# Patient Record
Sex: FEMALE | Race: BLACK OR AFRICAN AMERICAN | Employment: OTHER | URBAN - METROPOLITAN AREA
[De-identification: names, ages, dates, MRNs, and addresses within clinical notes are randomized per-mention and may not be internally consistent; named-entity substitution may affect disease eponyms.]

---

## 2020-07-31 ENCOUNTER — HOSPITAL ENCOUNTER (OUTPATIENT)
Dept: MRI IMAGING | Age: 70
Discharge: HOME | End: 2020-07-31
Attending: SURGERY
Payer: MEDICARE

## 2020-07-31 DIAGNOSIS — C50.911 BREAST CANCER, RIGHT (HCC): ICD-10-CM

## 2020-07-31 PROCEDURE — 74011250636 HC RX REV CODE- 250/636: Performed by: SURGERY

## 2020-07-31 PROCEDURE — 74011000258 HC RX REV CODE- 258: Performed by: SURGERY

## 2020-07-31 PROCEDURE — A9575 INJ GADOTERATE MEGLUMI 0.1ML: HCPCS | Performed by: SURGERY

## 2020-07-31 PROCEDURE — 77049 MRI BREAST C-+ W/CAD BI: CPT

## 2020-07-31 RX ORDER — GADOTERATE MEGLUMINE 376.9 MG/ML
24 INJECTION INTRAVENOUS
Status: COMPLETED | OUTPATIENT
Start: 2020-07-31 | End: 2020-07-31

## 2020-07-31 RX ADMIN — SODIUM CHLORIDE 100 ML: 900 INJECTION, SOLUTION INTRAVENOUS at 15:16

## 2020-07-31 RX ADMIN — GADOTERATE MEGLUMINE 21 ML: 376.9 INJECTION INTRAVENOUS at 15:16

## 2020-08-05 PROBLEM — E66.01 OBESITY, MORBID (HCC): Status: ACTIVE | Noted: 2020-08-05

## 2020-08-05 NOTE — H&P (VIEW-ONLY)
Sadie Adams DO 
[facility] [address] [place] Phone [phone]   Fax [phone] Date of visit: 2020 Primary/Requesting provider: Magi Adamson MD 
 
Chief Complaint Patient presents with  New Patient  
  right breast IDC Name: Flor Augustine MRN: [id number] : 1950 Age: 78 y.o. Sex: female PCP: Magi Adamson MD  
 
 
CC:   
Chief Complaint Patient presents with  New Patient  
  right breast IDC HPI: 
 
 Flor Augustine is a 78 y.o. female who presents for evaluation of right breast cancer. 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 Patient has positive family history of breast cancer and a sister at age 68. She denies any previous personal history of breast cancer. She denies any previous biopsies or suspicious abnormalities on mammogram.  Biopsy in [place] led to diagnosis of infiltrating ductal carcinoma ER RI status and HER-2 status is pending. An MRI has been completed revealing a 1.2 cm lesion in the right breast with no other suspicious lymphadenopathy or cancer in other locations. She is here today to discuss right breast cancer and possible surgery. MRI BILATERAL BREASTS WITHOUT AND WITH CONTRAST, 2020. CLINICAL HISTORY:  New diagnosis of right breast cancer. 
  
Technique: Multiplanar multisequence imaging of the breast were performed prior 
to and following the uneventful intravenous administration of 21 mL of Dotarem. Postcontrast imaging was performed using a dynamic technique. Images were 
reviewed using the [employer] software package.  
  
Sequences obtained: Axial T1 with and without fat saturation, axial STIR, and 
dynamic axial postcontrast fat-saturated T1-weighted images.  Additional 
subtraction images, maximum intensity projected images, and sagittal 
 reconstructed images were made from postcontrast images. 
  
Comparison studies: Right breast ultrasound 7/22/2020. FINDINGS:  
A skin marker has been placed at the 3:00 position of the right breast located 4.5 cm from the nipple. The breasts are composed of heterogeneous fibroglandular 
elements. Axillary, and internal mammary lymph nodes are seen which are not 
clearly enlarged or dysmorphic in their appearance. Evaluation of the lungs is 
limited by  MRI imaging. However, no obvious pulmonary masses are seen. No 
significant pleural effusions are seen. The liver is obscured by cardiac motion 
artifact and only partially visualized. However, no focal signal abnormalities 
are seen prior to, or following administration of contrast to suggest a possible 
hepatic lesion. Following the administration of intravenous contrast, normal enhancement is seen 
of the heart and vascular structures of the breasts. Moderate background 
physiologic enhancement is seen of the breasts which could obscure a very subtle 
process. An enhancing mass is seen at the 11:00 position of the right breast 
located 3 cm from the nipple measuring 1.2 cm long by 1 cm wide by 0.8 cm 
craniocaudal consistent with the patient's known right breast cancer. No 
dominant enhancing masses otherwise seen. Only nodular enhancement is seen in 
the bilateral breasts which appears symmetric in distribution and degree of 
enhancement consistent with benign enhancement. The anterior right breast skin 
does appear thickened and enhances. No enhancing osseous lesion is seen. 
  
IMPRESSION IMPRESSION: 
1.  1.2 cm x 1 cm x 0.8 cm enhancing mass at the 11:00 position of the right 
breast located 3 cm from the nipple consistent with the patient's known right 
breast cancer. 
  
2.  No findings concerning for metastatic disease in the visualized chest. 
However, there is asymmetric anterior right breast skin thickening which 
 demonstrates additional enhancement. This can be an indicator for lymphatic 
invasion (i.e. inflammatory breast cancer). Recommend correlation with clinical 
exam findings. 
  
3. Moderate nodular enhancement currently favored to represent benign background 
enhancement. However, the degree of enhancement could obscure a very subtle 
process. This patient should have a follow-up breast MRI 18 months following 
definitive surgical treatment to ensure that these changes remain stable or 
improve. 
  
BI-RADS Assessment Category 6: Known Biopsy Proven Malignancy PMH: 
 
Past Medical History:  
Diagnosis Date  Hypertension  Liver disease PSH: 
 
Past Surgical History:  
Procedure Laterality Date  HX HYSTERECTOMY  2003 MEDS: 
 
No current outpatient medications on file. No current facility-administered medications for this visit. ALLERGIES:   
 
Allergies no known allergies SH: Social History Tobacco Use  Smoking status: Never Smoker  Smokeless tobacco: Never Used Substance Use Topics  Alcohol use: Not on file  Drug use: Not on file FH: No family history on file. Review of Systems Constitutional: Negative. HENT: Negative. Eyes: Negative. Respiratory: Negative. Cardiovascular: Negative. Gastrointestinal: Negative. Genitourinary: Negative. Musculoskeletal: Negative. Skin:  
     Right breast IDC Neurological: Negative. Endo/Heme/Allergies: Negative. Psychiatric/Behavioral: Negative. Physical Exam:  
 
Visit Vitals Pulse 78 Ht 5' 4\" (1.626 m) Wt 250 lb (113.4 kg) BMI 42.91 kg/m² Physical Exam 
HENT:  
   Head: Normocephalic and atraumatic. Eyes:  
   Pupils: Pupils are equal, round, and reactive to light. Neck: Musculoskeletal: Normal range of motion and neck supple. Thyroid: No thyromegaly. Trachea: No tracheal deviation. Cardiovascular: Rate and Rhythm: Normal rate and regular rhythm. Heart sounds: Normal heart sounds. No murmur. Pulmonary:  
   Effort: Pulmonary effort is normal. No respiratory distress. Breath sounds: Normal breath sounds. No wheezing or rales. Chest:  
 
 
Abdominal:  
   General: Bowel sounds are normal. There is no distension. Palpations: Abdomen is soft. There is no mass. Tenderness: There is no abdominal tenderness. There is no guarding or rebound. Musculoskeletal: Normal range of motion. Skin: 
   General: Skin is warm and dry. Findings: No erythema. Neurological:  
   Mental Status: She is alert and oriented to person, place, and time. Psychiatric:     
   Mood and Affect: Mood normal.     
   Judgment: Judgment normal.  
 
 
 
Assessment/Plan:  Nova Parra is a 78 y.o. female who has signs and symptoms consistent with right breast infiltrating ductal carcinoma 1.2 cm for a T1BN0 Clinical diagnosis. The ER NC and HER-2 status are pending. Today we discussed breast cancer and she has decided upon bilateral mastectomy with right sentinel lymph node biopsy and possible axillary dissection. She will be scheduled at the next available date and time. ICD-10-CM ICD-9-CM 1. Malignant neoplasm of central portion of right female breast, unspecified estrogen receptor status (HCC)  C50.111 174.1 2. Obesity, morbid ([facility] [address]  E66.01 278.01 I went through the risks of bleeding, infection and anesthesia. Counseling time:counseling time more than 50% of visit: 1 hour was utilized in office visit today 50% times in a face-to-face discussion about breast cancer. We discussed the breast anatomy followed by history of breast cancer operations. We discussed the importance of the pathology and hormone receptors. In the end the patient has decided upon bilateral mastectomy Annabelle Stallings   We discussed the details of the procedure the postoperative expectations and recovery time. Patient will be presented at breast conference tomorrow.  
 
Signed: Sherry Gardiner DO  
8/5/2020  10:42 AM

## 2020-08-13 ENCOUNTER — HOSPITAL ENCOUNTER (OUTPATIENT)
Dept: SURGERY | Age: 70
Discharge: HOME | End: 2020-08-13

## 2020-08-17 VITALS — HEIGHT: 61 IN | WEIGHT: 250 LBS | BODY MASS INDEX: 47.2 KG/M2

## 2020-08-17 RX ORDER — DICLOFENAC SODIUM 10 MG/G
4 GEL TOPICAL
COMMUNITY
End: 2020-12-08

## 2020-08-17 RX ORDER — OMEPRAZOLE 40 MG/1
40 CAPSULE, DELAYED RELEASE ORAL DAILY
COMMUNITY

## 2020-08-17 RX ORDER — PRAVASTATIN SODIUM 40 MG/1
40 TABLET ORAL
COMMUNITY
End: 2020-12-08

## 2020-08-17 RX ORDER — FUROSEMIDE 20 MG/1
20 TABLET ORAL DAILY
COMMUNITY
End: 2020-12-08

## 2020-08-17 RX ORDER — NIFEDIPINE 90 MG/1
90 TABLET, EXTENDED RELEASE ORAL DAILY
COMMUNITY

## 2020-08-17 RX ORDER — METOPROLOL SUCCINATE 50 MG/1
50 TABLET, EXTENDED RELEASE ORAL DAILY
COMMUNITY

## 2020-08-17 RX ORDER — GLUCOSAMINE SULFATE 1500 MG
1000 POWDER IN PACKET (EA) ORAL DAILY
COMMUNITY

## 2020-08-17 RX ORDER — ASPIRIN 325 MG
81 TABLET ORAL DAILY
COMMUNITY

## 2020-08-17 NOTE — PERIOP NOTES
Patient verified name and . Order for consent not found in EHR. Type 2 surgery, PAT phone assessment complete. Orders not received. Labs per surgeon: no orders received. Labs per anesthesia protocol: Hgb, K+ and creatinine- Pt instructed to come for lab work (Monday- Friday 8:30 AM- 4:00 PM) prior to surgery day. Pt voiced an understanding. Patient answered medical/surgical history questions at their best of ability. All prior to admission medications documented in [facility]. Patient instructed to take the following medications the day of surgery according to anesthesia guidelines with a small sip of water: metoprolol, nifedipine and omeprazole. Hold all vitamins/supplements 7 days prior to surgery and NSAIDS 5 days prior to surgery. Patient instructed on the following:    > Covid result dated 8/10/20-   SARS-CoV-2, RUBEN    CANCELED    Comment:    Quantity was not sufficient for analysis. E-mail sent to Brooks Ban and Lorenzo Mena reguarding the above. > 1 visitor allowed at this time. >Arrive at [facility], time of arrival to be called the day before by 1700  >NPO after midnight including gum, mints, and ice chips  >Responsible adult must drive patient to the hospital, stay during surgery, and patient will need supervision 24 hours after anesthesia  >Use antibacterial soap in shower the night before surgery and on the morning of surgery  >All piercings must be removed prior to arrival.    >Leave all valuables (money and jewelry) at home but bring insurance card and ID on DOS. >Do not wear make-up, nail polish, lotions, cologne, perfumes, powders, or oil on skin.

## 2020-08-19 ENCOUNTER — ANESTHESIA EVENT (OUTPATIENT)
Dept: SURGERY | Age: 70
End: 2020-08-19
Payer: MEDICARE

## 2020-08-19 ENCOUNTER — HOSPITAL ENCOUNTER (OUTPATIENT)
Dept: LAB | Age: 70
Discharge: HOME | End: 2020-08-19
Payer: MEDICARE

## 2020-08-19 LAB
CREAT SERPL-MCNC: 1.16 MG/DL (ref 0.6–1)
HGB BLD-MCNC: 14.6 G/DL (ref 11.7–15.4)
POTASSIUM SERPL-SCNC: 3.5 MMOL/L (ref 3.5–5.1)

## 2020-08-19 PROCEDURE — 82565 ASSAY OF CREATININE: CPT

## 2020-08-19 PROCEDURE — 84132 ASSAY OF SERUM POTASSIUM: CPT

## 2020-08-19 PROCEDURE — 85018 HEMOGLOBIN: CPT

## 2020-08-19 PROCEDURE — 36415 COLL VENOUS BLD VENIPUNCTURE: CPT

## 2020-08-20 ENCOUNTER — HOSPITAL ENCOUNTER (OUTPATIENT)
Age: 70
Setting detail: OUTPATIENT SURGERY
Discharge: HOME | End: 2020-08-20
Attending: SURGERY | Admitting: SURGERY
Payer: MEDICARE

## 2020-08-20 ENCOUNTER — ANESTHESIA (OUTPATIENT)
Dept: SURGERY | Age: 70
End: 2020-08-20
Payer: MEDICARE

## 2020-08-20 ENCOUNTER — APPOINTMENT (OUTPATIENT)
Dept: NUCLEAR MEDICINE | Age: 70
End: 2020-08-20
Attending: SURGERY
Payer: MEDICARE

## 2020-08-20 VITALS
SYSTOLIC BLOOD PRESSURE: 124 MMHG | BODY MASS INDEX: 47.2 KG/M2 | TEMPERATURE: 98 F | HEART RATE: 61 BPM | DIASTOLIC BLOOD PRESSURE: 71 MMHG | RESPIRATION RATE: 16 BRPM | OXYGEN SATURATION: 93 % | WEIGHT: 250 LBS | HEIGHT: 61 IN

## 2020-08-20 DIAGNOSIS — C50.111 MALIGNANT NEOPLASM OF CENTRAL PORTION OF RIGHT FEMALE BREAST, UNSPECIFIED ESTROGEN RECEPTOR STATUS (HCC): ICD-10-CM

## 2020-08-20 DIAGNOSIS — Z40.01 PROPHYLACTIC BREAST REMOVAL: ICD-10-CM

## 2020-08-20 PROBLEM — C50.911 MALIGNANT NEOPLASM OF RIGHT FEMALE BREAST (HCC): Status: ACTIVE | Noted: 2020-08-20

## 2020-08-20 LAB — GLUCOSE BLD STRIP.AUTO-MCNC: 98 MG/DL (ref 65–100)

## 2020-08-20 PROCEDURE — 74011000250 HC RX REV CODE- 250: Performed by: ANESTHESIOLOGY

## 2020-08-20 PROCEDURE — 74011250636 HC RX REV CODE- 250/636: Performed by: ANESTHESIOLOGY

## 2020-08-20 PROCEDURE — 77030040922 HC BLNKT HYPOTHRM STRY -A: Performed by: ANESTHESIOLOGY

## 2020-08-20 PROCEDURE — 77030013674 HC FIL EXPND TISS ALGN -A: Performed by: SURGERY

## 2020-08-20 PROCEDURE — 76010000173 HC OR TIME 3 TO 3.5 HR INTENSV-TIER 1: Performed by: SURGERY

## 2020-08-20 PROCEDURE — 77030013567 HC DRN WND RESERV BARD -A: Performed by: SURGERY

## 2020-08-20 PROCEDURE — C1789 PROSTHESIS, BREAST, IMP: HCPCS | Performed by: SURGERY

## 2020-08-20 PROCEDURE — 77030002966 HC SUT PDS J&J -A: Performed by: SURGERY

## 2020-08-20 PROCEDURE — 76060000038 HC ANESTHESIA 3.5 TO 4 HR: Performed by: SURGERY

## 2020-08-20 PROCEDURE — 74011250636 HC RX REV CODE- 250/636: Performed by: SURGERY

## 2020-08-20 PROCEDURE — 77030031139 HC SUT VCRL2 J&J -A: Performed by: SURGERY

## 2020-08-20 PROCEDURE — 77030039425 HC BLD LARYNG TRULITE DISP TELE -A: Performed by: ANESTHESIOLOGY

## 2020-08-20 PROCEDURE — 77030002933 HC SUT MCRYL J&J -A: Performed by: SURGERY

## 2020-08-20 PROCEDURE — 74011250636 HC RX REV CODE- 250/636: Performed by: NURSE ANESTHETIST, CERTIFIED REGISTERED

## 2020-08-20 PROCEDURE — 77030027515 HC TBNG LIPO SUC MDCO -B: Performed by: SURGERY

## 2020-08-20 PROCEDURE — 76210000016 HC OR PH I REC 1 TO 1.5 HR: Performed by: SURGERY

## 2020-08-20 PROCEDURE — 74011000250 HC RX REV CODE- 250: Performed by: SURGERY

## 2020-08-20 PROCEDURE — 87635 SARS-COV-2 COVID-19 AMP PRB: CPT

## 2020-08-20 PROCEDURE — 77030011825 HC SUPP SURG PSTOP S2SG -B: Performed by: SURGERY

## 2020-08-20 PROCEDURE — 77030011265 HC ELECTRD BLD HEX COVD -A: Performed by: SURGERY

## 2020-08-20 PROCEDURE — 88307 TISSUE EXAM BY PATHOLOGIST: CPT

## 2020-08-20 PROCEDURE — 77030040506 HC DRN WND MDII -A: Performed by: SURGERY

## 2020-08-20 PROCEDURE — 74011250637 HC RX REV CODE- 250/637: Performed by: ANESTHESIOLOGY

## 2020-08-20 PROCEDURE — 77030037088 HC TUBE ENDOTRACH ORAL NSL COVD-A: Performed by: ANESTHESIOLOGY

## 2020-08-20 PROCEDURE — 77030012406 HC DRN WND PENRS BARD -A: Performed by: SURGERY

## 2020-08-20 PROCEDURE — 19303 MAST SIMPLE COMPLETE: CPT | Performed by: SURGERY

## 2020-08-20 PROCEDURE — 74011000250 HC RX REV CODE- 250: Performed by: NURSE ANESTHETIST, CERTIFIED REGISTERED

## 2020-08-20 PROCEDURE — 38525 BIOPSY/REMOVAL LYMPH NODES: CPT | Performed by: SURGERY

## 2020-08-20 PROCEDURE — 77030040504 HC DRN WND MDII -B: Performed by: SURGERY

## 2020-08-20 PROCEDURE — 77030002996 HC SUT SLK J&J -A: Performed by: SURGERY

## 2020-08-20 PROCEDURE — 76210000021 HC REC RM PH II 0.5 TO 1 HR: Performed by: SURGERY

## 2020-08-20 PROCEDURE — 77030041445 HC PENCL ELECT SMK EVAC STRY -B: Performed by: SURGERY

## 2020-08-20 PROCEDURE — 74011250636 HC RX REV CODE- 250/636: Performed by: PLASTIC SURGERY

## 2020-08-20 PROCEDURE — 77030002916 HC SUT ETHLN J&J -A: Performed by: SURGERY

## 2020-08-20 PROCEDURE — A9541 TC99M SULFUR COLLOID: HCPCS

## 2020-08-20 PROCEDURE — 77030008462 HC STPLR SKN PROX J&J -A: Performed by: SURGERY

## 2020-08-20 PROCEDURE — 77030026227 HC FUNL KELLR 2 PCH ALGN -C: Performed by: SURGERY

## 2020-08-20 PROCEDURE — 82962 GLUCOSE BLOOD TEST: CPT

## 2020-08-20 DEVICE — GRAFT HUM TISS W16XL20CM THK0.4-2.4MM REGEN TISS MTRX PERF: Type: IMPLANTABLE DEVICE | Site: BREAST | Status: FUNCTIONAL

## 2020-08-20 RX ORDER — ONDANSETRON 4 MG/1
4 TABLET, ORALLY DISINTEGRATING ORAL ONCE
Status: DISCONTINUED | OUTPATIENT
Start: 2020-08-20 | End: 2020-08-20 | Stop reason: HOSPADM

## 2020-08-20 RX ORDER — OXYCODONE HYDROCHLORIDE 5 MG/1
5 TABLET ORAL
Status: DISCONTINUED | OUTPATIENT
Start: 2020-08-20 | End: 2020-08-20 | Stop reason: HOSPADM

## 2020-08-20 RX ORDER — CEFAZOLIN SODIUM/WATER 2 G/20 ML
SYRINGE (ML) INTRAVENOUS AS NEEDED
Status: DISCONTINUED | OUTPATIENT
Start: 2020-08-20 | End: 2020-08-20 | Stop reason: HOSPADM

## 2020-08-20 RX ORDER — CEFAZOLIN SODIUM/WATER 2 G/20 ML
2 SYRINGE (ML) INTRAVENOUS ONCE
Status: COMPLETED | OUTPATIENT
Start: 2020-08-20 | End: 2020-08-20

## 2020-08-20 RX ORDER — NALOXONE HYDROCHLORIDE 0.4 MG/ML
0.1 INJECTION, SOLUTION INTRAMUSCULAR; INTRAVENOUS; SUBCUTANEOUS
Status: DISCONTINUED | OUTPATIENT
Start: 2020-08-20 | End: 2020-08-20 | Stop reason: HOSPADM

## 2020-08-20 RX ORDER — FENTANYL CITRATE 50 UG/ML
INJECTION, SOLUTION INTRAMUSCULAR; INTRAVENOUS AS NEEDED
Status: DISCONTINUED | OUTPATIENT
Start: 2020-08-20 | End: 2020-08-20 | Stop reason: HOSPADM

## 2020-08-20 RX ORDER — HYDROMORPHONE HYDROCHLORIDE 2 MG/ML
0.5 INJECTION, SOLUTION INTRAMUSCULAR; INTRAVENOUS; SUBCUTANEOUS
Status: DISCONTINUED | OUTPATIENT
Start: 2020-08-20 | End: 2020-08-20 | Stop reason: HOSPADM

## 2020-08-20 RX ORDER — HEPARIN SODIUM 5000 [USP'U]/ML
5000 INJECTION, SOLUTION INTRAVENOUS; SUBCUTANEOUS ONCE
Status: CANCELLED | OUTPATIENT
Start: 2020-08-20 | End: 2020-08-20

## 2020-08-20 RX ORDER — LIDOCAINE HYDROCHLORIDE 10 MG/ML
0.1 INJECTION INFILTRATION; PERINEURAL AS NEEDED
Status: DISCONTINUED | OUTPATIENT
Start: 2020-08-20 | End: 2020-08-20 | Stop reason: HOSPADM

## 2020-08-20 RX ORDER — CEFAZOLIN SODIUM/WATER 2 G/20 ML
2 SYRINGE (ML) INTRAVENOUS ONCE
Status: DISCONTINUED | OUTPATIENT
Start: 2020-08-20 | End: 2020-08-20 | Stop reason: SDUPTHER

## 2020-08-20 RX ORDER — MIDAZOLAM HYDROCHLORIDE 1 MG/ML
2 INJECTION, SOLUTION INTRAMUSCULAR; INTRAVENOUS
Status: COMPLETED | OUTPATIENT
Start: 2020-08-20 | End: 2020-08-20

## 2020-08-20 RX ORDER — GLYCOPYRROLATE 0.2 MG/ML
INJECTION INTRAMUSCULAR; INTRAVENOUS AS NEEDED
Status: DISCONTINUED | OUTPATIENT
Start: 2020-08-20 | End: 2020-08-20 | Stop reason: HOSPADM

## 2020-08-20 RX ORDER — SODIUM CHLORIDE, SODIUM LACTATE, POTASSIUM CHLORIDE, CALCIUM CHLORIDE 600; 310; 30; 20 MG/100ML; MG/100ML; MG/100ML; MG/100ML
100 INJECTION, SOLUTION INTRAVENOUS CONTINUOUS
Status: DISCONTINUED | OUTPATIENT
Start: 2020-08-20 | End: 2020-08-20 | Stop reason: HOSPADM

## 2020-08-20 RX ORDER — LIDOCAINE HYDROCHLORIDE 20 MG/ML
INJECTION, SOLUTION EPIDURAL; INFILTRATION; INTRACAUDAL; PERINEURAL AS NEEDED
Status: DISCONTINUED | OUTPATIENT
Start: 2020-08-20 | End: 2020-08-20 | Stop reason: HOSPADM

## 2020-08-20 RX ORDER — NEOSTIGMINE METHYLSULFATE 1 MG/ML
INJECTION, SOLUTION INTRAVENOUS AS NEEDED
Status: DISCONTINUED | OUTPATIENT
Start: 2020-08-20 | End: 2020-08-20 | Stop reason: HOSPADM

## 2020-08-20 RX ORDER — ROCURONIUM BROMIDE 10 MG/ML
INJECTION, SOLUTION INTRAVENOUS AS NEEDED
Status: DISCONTINUED | OUTPATIENT
Start: 2020-08-20 | End: 2020-08-20 | Stop reason: HOSPADM

## 2020-08-20 RX ORDER — PROPOFOL 10 MG/ML
INJECTION, EMULSION INTRAVENOUS AS NEEDED
Status: DISCONTINUED | OUTPATIENT
Start: 2020-08-20 | End: 2020-08-20 | Stop reason: HOSPADM

## 2020-08-20 RX ORDER — KETAMINE HYDROCHLORIDE 50 MG/ML
INJECTION, SOLUTION INTRAMUSCULAR; INTRAVENOUS AS NEEDED
Status: DISCONTINUED | OUTPATIENT
Start: 2020-08-20 | End: 2020-08-20 | Stop reason: HOSPADM

## 2020-08-20 RX ORDER — BUPIVACAINE HYDROCHLORIDE 2.5 MG/ML
INJECTION, SOLUTION EPIDURAL; INFILTRATION; INTRACAUDAL AS NEEDED
Status: DISCONTINUED | OUTPATIENT
Start: 2020-08-20 | End: 2020-08-20 | Stop reason: HOSPADM

## 2020-08-20 RX ORDER — HEPARIN SODIUM 5000 [USP'U]/ML
5000 INJECTION, SOLUTION INTRAVENOUS; SUBCUTANEOUS ONCE
Status: COMPLETED | OUTPATIENT
Start: 2020-08-20 | End: 2020-08-20

## 2020-08-20 RX ORDER — FLUMAZENIL 0.1 MG/ML
0.2 INJECTION INTRAVENOUS
Status: DISCONTINUED | OUTPATIENT
Start: 2020-08-20 | End: 2020-08-20 | Stop reason: HOSPADM

## 2020-08-20 RX ORDER — HALOPERIDOL 5 MG/ML
1 INJECTION INTRAMUSCULAR
Status: COMPLETED | OUTPATIENT
Start: 2020-08-20 | End: 2020-08-20

## 2020-08-20 RX ORDER — CEFAZOLIN SODIUM/WATER 2 G/20 ML
2 SYRINGE (ML) INTRAVENOUS ONCE
Status: CANCELLED | OUTPATIENT
Start: 2020-08-20 | End: 2020-08-20

## 2020-08-20 RX ORDER — ONDANSETRON 2 MG/ML
INJECTION INTRAMUSCULAR; INTRAVENOUS AS NEEDED
Status: DISCONTINUED | OUTPATIENT
Start: 2020-08-20 | End: 2020-08-20 | Stop reason: HOSPADM

## 2020-08-20 RX ORDER — DEXAMETHASONE SODIUM PHOSPHATE 4 MG/ML
INJECTION, SOLUTION INTRA-ARTICULAR; INTRALESIONAL; INTRAMUSCULAR; INTRAVENOUS; SOFT TISSUE AS NEEDED
Status: DISCONTINUED | OUTPATIENT
Start: 2020-08-20 | End: 2020-08-20 | Stop reason: HOSPADM

## 2020-08-20 RX ORDER — DIPHENHYDRAMINE HYDROCHLORIDE 50 MG/ML
12.5 INJECTION, SOLUTION INTRAMUSCULAR; INTRAVENOUS
Status: DISCONTINUED | OUTPATIENT
Start: 2020-08-20 | End: 2020-08-20 | Stop reason: HOSPADM

## 2020-08-20 RX ORDER — ACETAMINOPHEN 500 MG
1000 TABLET ORAL ONCE
Status: COMPLETED | OUTPATIENT
Start: 2020-08-20 | End: 2020-08-20

## 2020-08-20 RX ORDER — SODIUM CHLORIDE, SODIUM LACTATE, POTASSIUM CHLORIDE, CALCIUM CHLORIDE 600; 310; 30; 20 MG/100ML; MG/100ML; MG/100ML; MG/100ML
75 INJECTION, SOLUTION INTRAVENOUS CONTINUOUS
Status: DISCONTINUED | OUTPATIENT
Start: 2020-08-20 | End: 2020-08-20 | Stop reason: HOSPADM

## 2020-08-20 RX ADMIN — FENTANYL CITRATE 100 MCG: 50 INJECTION INTRAMUSCULAR; INTRAVENOUS at 12:18

## 2020-08-20 RX ADMIN — KETAMINE HYDROCHLORIDE 25 MG: 50 INJECTION INTRAMUSCULAR; INTRAVENOUS at 14:30

## 2020-08-20 RX ADMIN — CEFAZOLIN SODIUM 2 G: 100 INJECTION, POWDER, LYOPHILIZED, FOR SOLUTION INTRAVENOUS at 09:53

## 2020-08-20 RX ADMIN — ACETAMINOPHEN 1000 MG: 500 TABLET, FILM COATED ORAL at 09:51

## 2020-08-20 RX ADMIN — HEPARIN SODIUM 5000 UNITS: 5000 INJECTION INTRAVENOUS; SUBCUTANEOUS at 09:52

## 2020-08-20 RX ADMIN — SODIUM CHLORIDE, SODIUM LACTATE, POTASSIUM CHLORIDE, AND CALCIUM CHLORIDE: 600; 310; 30; 20 INJECTION, SOLUTION INTRAVENOUS at 12:18

## 2020-08-20 RX ADMIN — MIDAZOLAM 2 MG: 1 INJECTION INTRAMUSCULAR; INTRAVENOUS at 11:26

## 2020-08-20 RX ADMIN — FAMOTIDINE 20 MG: 10 INJECTION INTRAVENOUS at 11:26

## 2020-08-20 RX ADMIN — DEXAMETHASONE SODIUM PHOSPHATE 4 MG: 4 INJECTION, SOLUTION INTRAMUSCULAR; INTRAVENOUS at 14:42

## 2020-08-20 RX ADMIN — LIDOCAINE HYDROCHLORIDE 0.1 ML: 10 INJECTION, SOLUTION INFILTRATION; PERINEURAL at 09:23

## 2020-08-20 RX ADMIN — ONDANSETRON 4 MG: 2 INJECTION INTRAMUSCULAR; INTRAVENOUS at 14:42

## 2020-08-20 RX ADMIN — KETAMINE HYDROCHLORIDE 25 MG: 50 INJECTION INTRAMUSCULAR; INTRAVENOUS at 13:30

## 2020-08-20 RX ADMIN — HALOPERIDOL LACTATE 1 MG: 5 INJECTION, SOLUTION INTRAMUSCULAR at 16:22

## 2020-08-20 RX ADMIN — GLYCOPYRROLATE 0.4 MG: 0.2 INJECTION, SOLUTION INTRAMUSCULAR; INTRAVENOUS at 14:56

## 2020-08-20 RX ADMIN — HYDROMORPHONE HYDROCHLORIDE 0.5 MG: 2 INJECTION INTRAMUSCULAR; INTRAVENOUS; SUBCUTANEOUS at 16:22

## 2020-08-20 RX ADMIN — PROPOFOL 180 MG: 10 INJECTION, EMULSION INTRAVENOUS at 12:18

## 2020-08-20 RX ADMIN — Medication 3 MG: at 14:56

## 2020-08-20 RX ADMIN — KETAMINE HYDROCHLORIDE 50 MG: 50 INJECTION INTRAMUSCULAR; INTRAVENOUS at 12:18

## 2020-08-20 RX ADMIN — HYDROMORPHONE HYDROCHLORIDE 0.5 MG: 2 INJECTION INTRAMUSCULAR; INTRAVENOUS; SUBCUTANEOUS at 16:29

## 2020-08-20 RX ADMIN — ROCURONIUM BROMIDE 50 MG: 10 INJECTION, SOLUTION INTRAVENOUS at 12:18

## 2020-08-20 RX ADMIN — SODIUM CHLORIDE, SODIUM LACTATE, POTASSIUM CHLORIDE, AND CALCIUM CHLORIDE 100 ML/HR: 600; 310; 30; 20 INJECTION, SOLUTION INTRAVENOUS at 09:23

## 2020-08-20 RX ADMIN — LIDOCAINE HYDROCHLORIDE 100 MG: 20 INJECTION, SOLUTION EPIDURAL; INFILTRATION; INTRACAUDAL; PERINEURAL at 12:18

## 2020-08-20 RX ADMIN — Medication 2 G: at 12:08

## 2020-08-20 NOTE — ANESTHESIA PREPROCEDURE EVALUATION
Relevant Problems   No relevant active problems       Anesthetic History   No history of anesthetic complications            Review of Systems / Medical History  Patient summary reviewed and pertinent labs reviewed    Pulmonary  Within defined limits        Undiagnosed apnea         Neuro/Psych   Within defined limits           Cardiovascular    Hypertension: well controlled              Exercise tolerance: >4 METS  Comments: Denies recent CP, SOB or Palpitations   GI/Hepatic/Renal     GERD: well controlled      Hiatal hernia and liver disease (Fatty Liver)     Endo/Other        Morbid obesity, arthritis and cancer (Breast)     Other Findings            Physical Exam    Airway  Mallampati: II  TM Distance: > 6 cm  Neck ROM: normal range of motion   Mouth opening: Normal     Cardiovascular  Regular rate and rhythm,  S1 and S2 normal,  no murmur, click, rub, or gallop             Dental    Dentition: Edentulous, Full lower dentures and Full upper dentures     Pulmonary  Breath sounds clear to auscultation               Abdominal  GI exam deferred       Other Findings            Anesthetic Plan    ASA: 3  Anesthesia type: general          Induction: Intravenous  Anesthetic plan and risks discussed with: Patient

## 2020-08-20 NOTE — DISCHARGE INSTRUCTIONS
Post op instructions:  1. May shower only, no tub bathing, no hot tub, no swimming. 2. Keep incision clean with regular soap and water. 3. No lifting over 10 lbs. 4. Regular diet as tolerated. 5. May remove topical dressing on Day #2. Leave steri strips until seen in Dr. Gardiner's office at follow up appointment. 6. No driving on pain medicines. 7. Resume home medicines as usual.   8.         Empty drain twice daily and as often as needed and record time, date and volume. Bring results to post op office appointment. Sarah Gardiner, DO      Patient Education        Axillary Lymph Node Dissection: What to Expect at Home  Your Recovery  Right after the surgery you will probably feel weak, and your shoulder area will feel sore and stiff for a few days. It may be hard to move your arm and shoulder in all directions. Your doctor or physical therapist will teach you some arm exercises. You now have a higher chance of swelling in the affected arm. This is called lymphedema. From now on, you will have to be careful when using your arm. You will have a scar under your arm that will fade over time. You may also notice a hollow area in your armpit. It may also feel like you have a lump in your armpit. You may lose some feeling under your arm, or the arm may have a tingling or burning feeling. The loss of feeling may last only a little while, or it may last the rest of your life. You will probably be able to go back to work or your normal routine in 3 to 6 weeks. This depends on the type of work you do and any further treatment. If cancer was found in the lymph nodes, you will probably need more treatment. An axillary node dissection may be done at the same time as other breast cancer surgeries. If this is the case, your recovery may be different. When you find out that you have cancer, you may feel many emotions and may need some help coping. Seek out family, friends, and counselors for support.  You also can do things at home to make yourself feel better while you go through treatment. Call the [facility] ([phone]) or visit its website at [address] for more information. This care sheet gives you a general idea about how long it will take for you to recover. But each person recovers at a different pace. Follow the steps below to get better as quickly as possible. How can you care for yourself at home? Activity  · Rest when you feel tired. Getting enough sleep will help you recover. · Try to walk each day. Start by walking a little more than you did the day before. Bit by bit, increase the amount you walk. Walking boosts blood flow and helps prevent pneumonia and constipation. · Avoid strenuous activities, such as biking, jogging, weightlifting, or aerobic exercise, until your doctor says it is okay. This includes housework, especially if you have to use your affected arm. You will probably be able to do your normal activities in 3 to 6 weeks. But for the next 3 to 6 months, be careful when you do tasks that use the same motions over and over, such as vacuuming, weed pulling, and window cleaning. · For 4 to 6 weeks, avoid lifting anything that weighs more than 10 to 15 pounds or that would make you strain. This may include heavy grocery bags and milk containers, a heavy briefcase or backpack, cat litter or dog food bags, a vacuum , or a child. · Ask your doctor when you can drive again. · You will probably be able to go back to work or your normal routine in 3 to 6 weeks. It will also depend on the type of work you do and any further treatment. · You may be able to take showers (unless you have a drain in your incision) 24 to 48 hours after surgery. Pat the cut (incision) dry. Do not take a bath for the first 2 weeks, or until your doctor tells you it is okay. If you have a drain coming out of your incision, follow your doctor's instructions to empty and care for it.   · Take precautions to prevent infection and swelling in your arm. This is called lymphedema. ? Wear gloves when you garden, handle garbage, wash dishes, and clean house. ? Protect your hands and arms from burns, including sunburns. ? Do not wear tight sleeves, elastic cuffs, bracelets, wristwatches, or rings on the affected arm. ? Do not let anyone take blood pressure, draw blood, or give shots in that arm.  ? Do not carry heavy purses, suitcases, grocery bags, and other heavy items with that arm.  ? Keep the skin of that arm well moisturized. ? Do not cut your cuticles. ? Use an electric shaver if you shave your armpits. ? Protect yourself from insect bites on the arm. ? Wear medical alert jewelry that says you can develop lymphedema. You can buy this at most [employer] and on the Internet. Diet  · You can eat your normal diet. If your stomach is upset, try bland, low-fat foods like plain rice, broiled chicken, toast, and yogurt. · You may notice that your bowel movements are not regular right after your surgery. This is common. Try to avoid constipation and straining with bowel movements. You may want to take a fiber supplement every day. If you have not had a bowel movement after a couple of days, ask your doctor about taking a mild laxative. Medicines  · Your doctor will tell you if and when you can restart your medicines. He or she will also give you instructions about taking any new medicines. · If you take aspirin or some other blood thinner, ask your doctor if and when to start taking it again. Make sure that you understand exactly what your doctor wants you to do. · Be safe with medicines. Take pain medicines exactly as directed. ? If the doctor gave you a prescription medicine for pain, take it as prescribed. ? If you are not taking a prescription pain medicine, ask your doctor if you can take an over-the-counter medicine. · If your doctor prescribed antibiotics, take them as directed.  Do not stop taking them just because you feel better. You need to take the full course of antibiotics. · If you think your pain medicine is making you sick to your stomach:  ? Take your medicine after meals (unless your doctor has told you not to). ? Ask your doctor for a different pain medicine. Incision care  · If you have strips of tape on the cut (incision) the doctor made, leave the tape on for a week or until it falls off. · After 24 to 48 hours, wash the area daily with warm, soapy water and pat it dry. Keep the area clean and dry. · You may cover the area with a gauze bandage if it weeps or rubs against clothing. Change the bandage every day. Exercise  · You will need to do arm exercises once your doctor tells you it is okay. Do the range-of-motion exercises as instructed by your doctor. Elevation  · Prop up your arm on a pillow anytime you sit or lie down. Try to keep it above the level of your heart. This will help reduce swelling. Other instructions  · You may have a drain in your armpit. Follow your doctor's instructions to empty and care for it. Follow-up care is a key part of your treatment and safety. Be sure to make and go to all appointments, and call your doctor if you are having problems. It's also a good idea to know your test results and keep a list of the medicines you take. When should you call for help? [id number] anytime you think you may need emergency care. For example, call if:  · You passed out (lost consciousness). · You have chest pain, are short of breath, or cough up blood. Call your doctor now or seek immediate medical care if:  · You have pain that does not get better after you take pain medicine. · You cannot pass stools or gas. · You are sick to your stomach or cannot drink fluids. · You have signs of a blood clot in your leg (called a deep vein thrombosis), such as:  ? Pain in your calf, back of the knee, thigh, or groin. ? Redness or swelling in your leg.   · You have loose stitches, or your incision comes open. · Bright red blood has soaked through the bandage over your incision. · You have signs of infection, such as:  ? Increased pain, swelling, warmth, or redness. ? Red streaks leading from the incision. ? Pus draining from the incision. ? A fever. Watch closely for changes in your health, and be sure to contact your doctor if:  · You have any problems. · You have new or worse swelling or pain in your arm. Where can you learn more? Go to http://dee-didi.info/  Enter L189 in the search box to learn more about \"Axillary Lymph Node Dissection: What to Expect at Home. \"  Current as of: August 22, 2019               Content Version: 12.5  © 6571-5682 [employer]. Care instructions adapted under license by [employer] (which disclaims liability or warranty for this information). If you have questions about a medical condition or this instruction, always ask your healthcare professional. [place] [address] any warranty or liability for your use of this information. Patient Education        Preventing Lymphedema After Treatment for Breast Cancer: Care Instructions  Your Care Instructions     Lymphedema is a buildup of fluid in the soft tissues of the body. It can happen in the arm after breast cancer surgery to remove lymph nodes. If there are few or no lymph nodes, fluid can build up in the arm. It can also happen if the lymph system in an arm has been damaged. Infection, tumors, and scar tissue from radiation therapy to the armpit area also can cause fluid to build up. You may be able to avoid lymphedema or keep it under control by following the tips below. Make sure that you take good care of the skin on your arm and hand. Your skin acts as a barrier to keep out bacteria and prevent infection. It is also important not to overuse the muscles in the arm.  And don't expose your arm to very hot or cold temperatures. Lymphedema can happen soon after breast cancer treatment. Or it may happen many years later. It may affect only part of your arm or hand. In some cases, it affects all of the arm. Make sure to follow these precautions even after you finish treatment. Do not ignore tightness or swelling in or around your arm or hand. You are less likely to have long-term problems if you get these symptoms treated right away. Follow-up care is a key part of your treatment and safety. Be sure to make and go to all appointments, and call your doctor if you are having problems. It's also a good idea to know your test results and keep a list of the medicines you take. How can you care for yourself at home? Skin care  · Keep your arm, hand, and armpit clean. Use a mild soap that does not dry out your skin. · Moisturize your skin often. · Take good care of the skin around your fingernails. Do not bite or cut your cuticles. · Ask your doctor how to handle any cuts, scratches, insect bites, or other injuries you may get. · Use sunscreen and insect repellent outdoors to protect your skin from sunburn and insect bites. · Use an electric razor to shave your armpit. It's less likely to cut or irritate your skin. Activity  · Don't wear clothing or jewelry that is tight on your arm or hand. Your doctor may advise you not to wear a watch or rings on the affected hand. · Wear gloves when you do activities that could hurt the skin on your fingers or hand. Wear them when you garden, do yard work, wash dishes, and clean with chemicals. Use oven mitts when you handle hot food. · Do not have blood drawn from the arm on the side of the lymph node surgery. Do not get injections (shots) or have an IV put in the affected arm. · Do not allow a blood pressure cuff to be placed on that arm. If you are in the hospital, make sure you tell your nurse and other hospital staff about your condition.   · Do not expose your arm to very hot or very cold temperatures. For example, do not use hot tubs, saunas, or steam rooms. Do not use a heating pad or cold pack on that arm or shoulder. · Rest your arm often when you do repeated movements, such as vacuum, scrub, or mop. · Try to use your other arm to carry heavy things, such as grocery bags. Avoid shoulder straps when you carry a briefcase or purse. Carry your purse on your good arm. · Ask your doctor about wearing a compression sleeve and glove (gauntlet). Your doctor may want you to wear these when you exercise or when you fly in an airplane. They can help keep fluid from pooling in your arm and hand. Exercise  · Ask your doctor about exercises for your arm and hand. Your doctor may recommend that you see a physical therapist. This person can teach you how to do self-massage to move fluid out of your arm. · Check with your doctor before you start exercises that use the arm. This includes tennis, rowing, or weight lifting. Your doctor can help you find an activity level that is right for you. When should you call for help? Call your doctor now or seek immediate medical care if:  · You have signs of infection, such as:  ? Increased pain, swelling, warmth, or redness. ? Red streaks leading from the area. ? Pus draining from the area. ? A fever. Watch closely for changes in your health, and be sure to contact your doctor if:  · You have new or worse symptoms from lymphedema. · You do not get better as expected. Where can you learn more? Go to http://www.gray.com/  Enter G546 in the search box to learn more about \"Preventing Lymphedema After Treatment for Breast Cancer: Care Instructions. \"  Current as of: August 22, 2019               Content Version: 12.5  © 5920-3626 Healthwise, [place]. Care instructions adapted under license by [employer] (which disclaims liability or warranty for this information).  If you have questions about a medical condition or this instruction, always ask your healthcare professional. [place] [address] any warranty or liability for your use of this information. Patient Education        [place] Node Biopsy for Breast Cancer: What to Expect at [facility]  After a sentinel node biopsy, many people have no side effects. Some people have pain or bruising at the cut (incision) and feel tired. Your breast and underarm area may be slightly swollen. This may last a few days. You should feel close to normal in a few days. The incision the doctor made usually heals in about 2 weeks. The scar usually fades with time. Some people have a buildup of fluid in the area where the lymph nodes were removed. This is known as seroma. This goes away on its own, or your doctor can drain it. When you had this test, your doctor injected blue dye or radioactive material (or both) into your breast. The blue dye may give your breast a bluish color and turn your urine green for about 24 hours. The radioactive material leaves the body on its own in 24 to 48 hours. A sentinel node biopsy may be done at the same time as other breast surgeries. If this is the case, how you recover will be different. This care sheet gives you a general idea about how long it will take for you to recover. But each person recovers at a different pace. Follow the steps below to get better as quickly as possible. How can you care for yourself at home? Activity  · Rest when you feel tired. Getting enough sleep will help you recover. · Try to walk each day. Start by walking a little more than you did the day before. Bit by bit, increase the amount you walk. Walking boosts blood flow and helps prevent pneumonia and constipation. · You may drive when you are no longer taking pain medicine and you feel up to it. · You can lift things when you feel comfortable doing so. · Most women return to work and their normal routines in 2 to 7 days.   · You may shower 24 to 48 hours after surgery, if your doctor okays it. Pat the incision dry. Do not take a bath for the first 2 weeks, or until your doctor tells you it is okay. · Avoid activity or exercise that may put stress on the cut. This includes washing windows, vacuuming, or gardening with the affected arm. Diet  · You can eat your normal diet. If your stomach is upset, try bland, low-fat foods like plain rice, broiled chicken, toast, and yogurt. · You may notice that your bowels are not regular right after your surgery. This is common. Try to avoid constipation and straining with bowel movements. Take a fiber supplement such as Citrucel or Metamucil every day. If you have not had a bowel movement after a couple of days, take a mild laxative. Medicines  · Your doctor will tell you if and when you can restart your medicines. He or she will also give you instructions about taking any new medicines. · If you take aspirin or some other blood thinner, ask your doctor if and when to start taking it again. Make sure that you understand exactly what your doctor wants you to do. · Take pain medicines exactly as directed. ? If the doctor gave you a prescription medicine for pain, take it as prescribed. ? If you are not taking a prescription pain medicine, take an over-the-counter medicine such as acetaminophen (Tylenol), ibuprofen (Advil, Motrin), or naproxen (Aleve). Read and follow all instructions on the label. ? Do not take two or more pain medicines at the same time unless the doctor told you to. Many pain medicines have acetaminophen, which is Tylenol. Too much acetaminophen (Tylenol) can be harmful. · If your doctor prescribed antibiotics, take them as directed. Do not stop taking them just because you feel better. You need to take the full course of antibiotics.   · If you think your pain medicine is making you sick to your stomach:  ? Take your medicine after meals (unless your doctor has told you not to).  ? Ask your doctor for a different pain medicine. Incision care  · If you have strips of tape on the cut (incision) the doctor made, leave the tape on for about 1 week or until it falls off. · After you can shower, wash the area daily with warm, soapy water and pat it dry. Follow-up care is a key part of your treatment and safety. Be sure to make and go to all appointments, and call your doctor if you are having problems. It's also a good idea to know your test results and keep a list of the medicines you take. When should you call for help? [id number] anytime you think you may need emergency care. For example, call if:  · You passed out (lost consciousness). · You have chest pain, are short of breath, or cough up blood. Call your doctor now or seek immediate medical care if:  · You have pain that does not get better after you take pain medicine. · You cannot pass stools or gas. · You are sick to your stomach or cannot drink fluids. · You have signs of a blood clot in your leg (called a deep vein thrombosis), such as:  ? Pain in your calf, back of the knee, thigh, or groin. ? Redness or swelling in your leg. · You have signs of infection, such as:  ? Increased pain, swelling, warmth, or redness. ? Red streaks leading from the incision. ? Pus draining from the incision. ? A fever. · You have loose stitches, or your incision comes open. · Bright red blood has soaked through the bandage over your incision. Watch closely for changes in your health, and be sure to contact your doctor if:  · You have any problems. · You have new or worse swelling or pain in your arm. Where can you learn more? Go to http://dee-didi.info/  Enter H004 in the search box to learn more about \"[place] Node Biopsy for Breast Cancer: What to Expect at Home. \"  Current as of: August 22, 2019               Content Version: 12.5  © 6370-8903 Healthwise[employer].    Care instructions adapted under license by [address] (which disclaims liability or warranty for this information). If you have questions about a medical condition or this instruction, always ask your healthcare professional. [address] any warranty or liability for your use of this information. After general anesthesia or intravenous sedation, for 24 hours or while taking prescription Narcotics:  · Limit your activities  · A responsible adult needs to be with you for the next 24 hours  · Do not drive and operate hazardous machinery  · Do not make important personal or business decisions  · Do  not drink alcoholic beverages  · If you have not urinated within 8 hours after discharge, please contact your surgeon on call. · If you have sleep apnea and have a CPAP machine, please use it for all naps and sleeping. · Please use caution when taking narcotics and any of your home medications that may cause drowsiness. *  Please give a list of your current medications to your Primary Care Provider. *  Please update this list whenever your medications are discontinued, doses are      changed, or new medications (including over-the-counter products) are added. *  Please carry medication information at all times in case of emergency situations. These are general instructions for a healthy lifestyle:  No smoking/ No tobacco products/ Avoid exposure to second hand smoke  Surgeon General's Warning:  Quitting smoking now greatly reduces serious risk to your health.   Obesity, smoking, and sedentary lifestyle greatly increases your risk for illness  A healthy diet, regular physical exercise & weight monitoring are important for maintaining a healthy lifestyle    You may be retaining fluid if you have a history of heart failure or if you experience any of the following symptoms:  Weight gain of 3 pounds or more overnight or 5 pounds in a week, increased swelling in our hands or feet or shortness of breath while lying flat in bed. Please call your doctor as soon as you notice any of these symptoms; do not wait until your next office visit. Patient Education        Surgical Drain Care: Care Instructions  Your Care Instructions     After a surgery, fluid may collect inside your body in the surgical area. This makes an infection or other problems more likely. A surgical drain allows the fluid to flow out. The doctor puts a thin, flexible rubber tube into the area of your body where the fluid is likely to collect. The rubber tube carries the fluid outside your body. The most common type of surgical drain carries the fluid into a collection bulb that you empty. This is called a Amaury-Sahu (HOSEA) drain. The drain uses suction created by the bulb to pull the fluid from your body into the bulb. The rubber tube will probably be held in place by one or two stitches in your skin. The bulb will probably be attached with a safety pin to your clothes or near the bandage so that it doesn't flip around or pull on the stitches. Another type of drain is called a Penrose drain. This type of drain doesn't have a bulb. Instead, the end of the tube is open. That allows the fluid to drain onto a dressing taped to your skin. The drain may be kept in place next to your skin with a stitch or a safety pin in the tube. When you first get the drain, the fluid will be bloody. It will change color from red to pink to a light yellow or clear as the wound heals and the fluid starts to go away. Your doctor may give you information on when you no longer need the drain and when it will be removed. Follow-up care is a key part of your treatment and safety. Be sure to make and go to all appointments, and call your doctor if you are having problems. It's also a good idea to know your test results and keep a list of the medicines you take. How do you empty the bulb of a Amaury-Sahu drain? Follow any instructions your doctor gives you.  How often you empty the bulb depends on how much fluid is draining. Empty the bulb when it is half full. 1. Wash your hands with soap and water. 2. Take the plug out of the bulb. 3. Empty the bulb. If your doctor asks you to measure the fluid, empty the fluid into a measuring cup, and write down the color and how much you collected. Your doctor will want to know this information. 4. Clean the plug with alcohol. 5. Squeeze the bulb until it is flat. This removes all the air from the bulb. You may need to put the bulb on a table or a counter to flatten it. 6. Keep the bulb flat, and put the plug in. The bulb should stay flat after you put the plug back in. This creates the suction that pulls the fluid into the bulb. 7. Empty the fluid into the toilet. 8. Wash your hands. How do you change the dressing around your surgical drain? You may have a dressing (bandage). The dressing is often made of gauze pads held on with tape. Your doctor will tell you how often to change it. 1. Wash your hands with soap and water. 2. Take off the dressing from around the drain. 3. Clean the drain site and the skin around it with soap and water. Use gauze or a cotton swab. 4. When the site is dry, put on a new dressing. The way your dressing is put on depends on what kind of drain you have. You will get instructions for your type of drain. 5. Wash your hands again with soap and water. Your doctor may ask you to keep track of your dressing changes. Write down the time of day and the amount and color of the fluid on the dressing. How do you help prevent clogs in your surgical drain? Squeezing or \"milking\" the tube of your surgical drain can help prevent clogs so that it drains correctly. Your doctor will tell you when you need to do this. In general, you do this when:  · You see a clot in the tube that prevents fluid from draining. The clot may look like a dark, stringy lining.   · You see fluid leaking around the tube where it goes into the skin. Follow these steps for milking the tube. 1. Use one hand to hold and pinch the tube where it leaves the skin. 2. With the thumb and first finger of your other hand, pinch the tube just below where you're holding it. 3. Slowly and firmly push your thumb and first finger down the tubing toward the end of the tube. 4. Repeat this as many times as needed to move the clot. If you have a Amaury-Sahu (HOSEA) drain, the clot should move down the tube and into the bulb. If you have a Penrose drain, the clot should move into the dressing. When should you call for help? Call your doctor now or seek immediate medical care if:  · You have signs of infection, such as:  ? Increased pain, swelling, warmth, or redness around the area. ? Red streaks leading from the area. ? Pus draining from the area. ? A fever. · You see a sudden change in the color or smell of the drainage. · The tube is coming loose where it leaves your skin. Watch closely for changes in your health, and be sure to contact your doctor if:  · You see a lot of fluid around the drain. · You cannot remove a clot from the tube by milking the tube. Where can you learn more? Go to http://dee-didi.info/  Enter K117 in the search box to learn more about \"Surgical Drain Care: Care Instructions. \"  Current as of: June 26, 2019               Content Version: 12.5  © 2931-1005 [employer]. Care instructions adapted under license by [employer] (which disclaims liability or warranty for this information). If you have questions about a medical condition or this instruction, always ask your healthcare professional. [place] [address] any warranty or liability for your use of this information.

## 2020-08-20 NOTE — BRIEF OP NOTE
Brief Postoperative Note    Patient: Fina Alcantar  YOB: 1950  MRN: [id number]    Date of Procedure: 8/20/2020     Pre-Op Diagnosis: Malignant neoplasm of right female breast, unspecified estrogen receptor status, unspecified site of breast ([facility] [address] [C50.911]  Prophylactic breast removal [Z40.01]    Post-Op Diagnosis: Same as preoperative diagnosis. Procedure(s):  1. LEFT MASTECTOMY NIPPLE SPARING REQUIRING TWICE AS MUCH TIME AND EFFORT CPT [id number]  2. RIGHT MASTECTOMY NIPPLE SPARING REQUIRING TWICE AS MUCH TIME AND EFFORT CPT [id number]  3. RIGHT DEEP AXILLARY DISSECTION CPT 96154  4. RIGHT SENTINEL LYMPH NODE BIOPSY CPT 46004      Surgeon(s):  MD Abdifatah Zavala, Shashank Santoyo DO    Surgical Assistant: None    Anesthesia: General     Estimated Blood Loss (mL): Minimal    Complications: None    Specimens:   ID Type Source Tests Collected by Time Destination   1 : LEFT BREAST Fresh Breast  Yanet Arreguin,  8/20/2020 1330 Pathology   2 : RIGHT BREAST Fresh Breast  Yanet Arreguin,  8/20/2020 1413 Pathology   3 : RIGHT BREAST SENTINEL NODE #1 Frozen Section   Yanet Arreguin,  8/20/2020 1410 Pathology        Implants:   Implant Name Type Inv.  Item Serial No.  Lot No. LRB No. Used Action   GRAFT TISS REGEN [id number] PERF -- Ardia Dania - [id number]  GRAFT TISS REGEN [id number] PERF -- Ardia Dania [id number] [facility] [id number] Right 1 Implanted   GRAFT TISS REGEN [id number] PERF -- ALLODERM SELECT - [id number]  GRAFT TISS REGEN [id number] PERF -- Ardia Dania [id number] [address] [place] [address] [id number] Left 1 Implanted   BREAST TISSUE EXPANDER-[employer], 650CC   [id number] [place] [id number] Left 1 Implanted   BREAST TISSUE EXPANDER, MEDIUM HEIGHT SMOOTH, [address] [facility]   [id number] [place] [id number] Left 1 Implanted       Drains: * No LDAs found *    Findings: Bilateral nipple sparing mastectomy with right SLN with frozen/touch prep negative. Prepectoral reconstruction by Dr. Davy Osorio. No gross findings of metastatic spread or palpable LN.     Electronically Signed by Layla Sandra DO on 8/20/2020 at 2:56 PM  [id number]

## 2020-08-20 NOTE — PERIOP NOTES
Labs done 8/19/20 within [facility] protocols.     Recent Results (from the past 24 hour(s))   CREATININE    Collection Time: 08/19/20  1:53 PM   Result Value Ref Range    Creatinine 1.16 (H) 0.6 - 1.0 MG/DL   HEMOGLOBIN    Collection Time: 08/19/20  1:53 PM   Result Value Ref Range    HGB 14.6 11.7 - 15.4 g/dL   POTASSIUM    Collection Time: 08/19/20  1:53 PM   Result Value Ref Range    Potassium 3.5 3.5 - 5.1 mmol/L

## 2020-08-20 NOTE — BRIEF OP NOTE
Brief Postoperative Note    Patient: Nikhil Martinez  YOB: 1950  MRN: [id number]    Date of Procedure: 8/20/2020     Pre-Op Diagnosis: Malignant neoplasm of right female breast, unspecified estrogen receptor status, unspecified site of breast ([facility] [address] [C50.911]  Prophylactic breast removal [Z40.01]    Post-Op Diagnosis: Same as preoperative diagnosis. Procedure(s):  SIMPLE BILATERAL BREAST MASTECTOMY  RIGHT SENTINEL NODE BIOPSY WITH LYMPHATIC MAPPING- FROZEN SECTION  BILATERAL BREAST RECONSTRUCTION W/ TISSUE EXPANDERS    Surgeon(s):  MD Abdifatah Butler Ralph Lites, DO    Surgical Assistant: None    Anesthesia: General     Estimated Blood Loss (mL): less than 50     Complications: None    Specimens:   ID Type Source Tests Collected by Time Destination   1 : LEFT BREAST Fresh Breast  Geovanna Blackwood, DO 8/20/2020 1330 Pathology   2 : RIGHT BREAST Fresh Breast  Geovanna Blackwood, DO 8/20/2020 1413 Pathology   3 : RIGHT BREAST SENTINEL NODE #1 Frozen Section   Manoharsamantha Blackwood, DO 8/20/2020 1410 Pathology        Implants:   Implant Name Type Inv. Item Serial No.  Lot No. LRB No. Used Action   GRAFT TISS REGEN [id number] PERF -- ALLODERM SELECT - [id number]  GRAFT TISS REGEN [id number] PERF -- Elham Clalexx [id number] [address] [place] [address] [id number] Right 1 Implanted   GRAFT TISS REGEN [id number] PERF -- ALLODERM SELECT - [id number]  GRAFT TISS REGEN [id number] PERF -- Elham Maryann [id number] [address] [place] [address] [id number] Left 1 Implanted   BREAST TISSUE EXPANDER-[facility], 650CC   [id number] [place] [id number] Left 1 Implanted   BRE[facility]ISSUE [facility]   [id number] [place] [id number] Left 1 Implanted       Drains:   Amaury-Sahu Drain 08/20/20 Left Breast (Active)   Site Assessment Clean, dry, & intact 08/20/20 1450   Dressing Status Clean, dry, & intact 08/20/20 145   Status Charged; Patent 08/20/20 1450 Drainage Color Serous 08/20/20 1459       Amaury-Sahu Drain 08/20/20 Left Breast (Active)   Site Assessment Clean, dry, & intact 08/20/20 1459   Dressing Status Clean, dry, & intact 08/20/20 1459   Status Charged; Patent 08/20/20 1459   Drainage Color Serous 08/20/20 1459       Amaury-Sahu Drain 08/20/20 Right Breast (Active)   Site Assessment Clean, dry, & intact 08/20/20 1459   Dressing Status Clean, dry, & intact 08/20/20 1459   Status Patent; Charged 08/20/20 1459   Drainage Color Serous 08/20/20 1459       Amaury-Sahu Drain 08/20/20 Right Breast (Active)   Site Assessment Clean, dry, & intact 08/20/20 1459   Dressing Status Clean, dry, & intact 08/20/20 1459   Status Patent; Charged 08/20/20 1459   Drainage Color Serous 08/20/20 1459       Findings: WNL    Electronically Signed by Emily Peguero MD on 8/20/2020 at 3:22 PM

## 2020-08-20 NOTE — INTERVAL H&P NOTE
Update History & Physical 
 
The Patient's History and Physical of August 5, 2020 was reviewed with the patient and I examined the patient. There was a decision by the patient to pursue plastic surgery consult and she has decided for immediate reconstruction today by Dr. Davy Osorio. The surgical site was confirmed by the patient and me. Plan:  The risk, benefits, expected outcome, and alternative to the recommended procedure have been discussed with the patient. Patient understands and wants to proceed with the procedure.  
 
Electronically signed by Layla Sandra DO on 8/20/2020 at 10:49 AM

## 2020-08-20 NOTE — ANESTHESIA POSTPROCEDURE EVALUATION
Procedure(s):  SIMPLE BILATERAL BREAST MASTECTOMY  RIGHT SENTINEL NODE BIOPSY WITH LYMPHATIC MAPPING- FROZEN SECTION  BILATERAL BREAST RECONSTRUCTION W/ TISSUE EXPANDERS.     general    Anesthesia Post Evaluation      Multimodal analgesia: multimodal analgesia used between 6 hours prior to anesthesia start to PACU discharge  Patient location during evaluation: PACU  Patient participation: complete - patient participated  Level of consciousness: awake and alert  Pain management: adequate  Airway patency: patent  Anesthetic complications: no  Cardiovascular status: acceptable and hemodynamically stable  Respiratory status: acceptable  Hydration status: acceptable  Post anesthesia nausea and vomiting:  controlled  Final Post Anesthesia Temperature Assessment:  Normothermia (36.0-37.5 degrees C)      INITIAL Post-op Vital signs:   Vitals Value Taken Time   /71 8/20/2020  4:49 PM   Temp 36.7 °C (98 °F) 8/20/2020  4:39 PM   Pulse 60 8/20/2020  4:49 PM   Resp 16 8/20/2020  4:49 PM   SpO2 92 % 8/20/2020  4:49 PM

## 2020-08-20 NOTE — H&P
2020  Jade Hall, 78, F   [facility] Plastic Surgery   Pre-Operative History and Physical  Patient Information  :  Patient Information-  Jade Hall   : 1950   Scheduled Services for : Grecia Cabrera   Appt Purpose: Breast Reconstruction Appt Notes: **Holding 20 for surgery**   Referral Source: Doctor   Occupation:   Insurance: [employer]/Medicare Plan: [employer]   Surgery date: 2020 Breast Reconstruction Tissue Expander - Bilateral Dr Jackie Garza III   Medications / Allergies  :  List any allergies: Allergy   1 None   List any current medications (Please include over-the-counter medications)      Drug Dosage   1. colchicine  . 6mg   2. furosemide 20mg   3. procardia XL 90mg   4. metoprolol 50mg   5. omeprazole 40mg   6. pravastatin 40mg   7. asprin 325mg   Non-steroidal anti-inflammatory drugs (ex. Motrin, Aleve)  Do you take vitamins/minerals? Yes If yes, what kind? Vitamin D   Height / Weight / BMI:  Height/Weight/BMI  Her height is 5ft 1in and weight is 250lbs. BMI is 47.23. Social / Family History:  ~MUS2 - Smoking Status  Never smoker. Social History  Patient denies alcohol use, denies using illegal drugs and denies high risk factors.    Family History  Breast Cancer Sister   Cancer Father Sister   High Blood Pressure Father  Vital Signs:  ~MUS2 - Blood Pressure  151/87   Vitals  Pulse 73 and RAPS02 95-98%   Pre-Operative Diagnosis:  Diagnosis - Text  Diagnosis: right breast cancer   Planned Procedure:  Planned Procedure  bilateral immediate breast reconstruction with placement of tissue expanders and dermal matrix   History of Present Illness  right breast cancer   Past Medical / Surgical History:  Past Medical History  Breast Cancer <Details>   Diabetes <Details>   High Blood Pressure <Details>  Review of Systems (check all current symptoms)  arthritis/joint disformity *YES* Fingers and shoulder asthma - no chest pain - no heart attack - no high blood pressure *YES* inflamation of veins - no murmur - no pacemaker - no phlebitis *YES* convulsions - no epilepsy or seizures - no fainting - no fever or chills - no heart disease - no nausea, vomiting, diarrheah when taking antibiotics - no unexpected weight loss or weight gain - no chronic cough - no hearing loss - no morning cough - no sinus disorder *YES* Sinus Problems diabetes *YES* Prediabetic excessive thirst/hunger - no frequent urination *YES* At Night thyroid disorder - no abdominal pain - no chronic diarrhea or constipation - no gastro-intestinal problems - no irregular heartbeat - no stomach absorptive disorder - no ulcers - no bladder problems *YES* currently breast feeding - no currently pregnant - no frequency/burning during urination - no kidney problems - no yeast infection - no anemia - no bleed easily - no blood clots - no blood transfusion - no arthralgia - no artificial joints - no limited motion in joints - no muscle weakness - no paralysis - no stroke - no numbness or tingling - no headache - no migraines - no bronchitis - no emphysema - no shortness of breath - no tuberculosis - no wheezing - no psychiatric treatment - no recent crisis - no history of psychiatric hospitilization - no   Past Surgeries  Hysterectomy w/Bladder Attachment 2003   bilateral catarcts  Anesthesia History  - Difficulty Waking Up. Ethnic Origin         Ethnic Origin VI. Very dark-skinned ()   Diagnosis Codes   :  Diagnosis - Breast  Cancer Breast - Right side   Diagnosis Codes  Breast   Pre-Operative Physical Exam:  HEENT  HEENT: Deferred. Cardiac Exam II  Cardiac Exam: WNL. Pulmonary Exam  Pulmonary Exam: WNL. Abdominal Exam  Abdominal Exam: Deferred. Extremity & Neuro Exam  Extremity & Neuro Exam: Deferred.    Pertinent System/Surgical Site Exam  bilateral breast within normal limits for surgery   Pre-Operative Checklist / Risks & Benefits:  Breast Reconstruction - expander  The risks of breast reconstruction with a tissue expander were reviewed with the patient and her questions answered. She understands the risks to include, but not be limited to: bleeding, infection, scars, asymmetry, poor cosmetic result, loss of part or all of the mastectomy flaps, implant malposition, deflation, migration, encapsulation, infection, hematoma, seroma, nerve injury, deep venous thrombosis, pulmonary emboli, the need for transfusion, and the remote risk of death. Understanding these issues she wishes to proceed. Pre-op Checklist  1. Patient seen preoperatively. 2. All patient questions answered. 3. Risks and benefits (including alternative treatments) reviewed. 4. Proper prescriptions given. 5. Photographs taken  6. Surgery time and date reviewed. 7. Complications both expected and unexpected discussed  8.  Patient verbalized understanding of the outcome possibilites inherent with this procedure  Pre-Op Risks and Benefits  bleeding, infection, scar, hematoma, seroma, change in nipple sensitivity-temporary or permanent, wound breakdown, hypertrophic scar, malposition of implant, implant failure, bruising, swelling, need for additional surgery, less than aesthetic result, numbness, asymmetry, partial skin flap loss, contour irregularities, under or over resection, possible need for drains, recurrence and DVT   Surgical Risks Discussed  Breast Reconstruction - expander   Pre-Operative Orders:  Planned Procedure  bilateral immediate breast reconstruction with placement of tissue expanders and dermal matrix   Pre-Operative Antibiotic Order Selection  Cefazolin 2GM IV piggyback (>80kg)   Pre-Operative Orders  Labs per Anesthesia, Nothing by mouth after midnight, SCD's per Anesthesia Guidelines, Pre-Operative Antibiotic Selection, Consent for: and Heparin 5000u on call to Pre-op holding   Chapperone:  Chapperone 2  Shelbi Moraes, CST

## 2020-08-21 LAB
COVID-19 RAPID TEST, COVR: NOT DETECTED
SARS COV-2, XPGCVT: NEGATIVE
SOURCE, COVRS: NORMAL

## 2020-08-21 NOTE — OP NOTES
[address] [address]  OPERATIVE REPORT    Name:  Alpa Kinsey  MR#:  [id number]  :  1950  ACCOUNT #:  [de-identified]  DATE OF SERVICE:  2020    PREOPERATIVE DIAGNOSIS:  Carcinoma of the right breast.    POSTOPERATIVE DIAGNOSIS:  Carcinoma of the right breast.    PROCEDURE PERFORMED:  Bilateral immediate breast reconstruction with tissue expanders (Orland CPX 4 medium height smooth 650 mL expanders, catalog number [id number], left serial number [id number] and right serial number [id number], both filled to 420 mL in the OR). SURGEON:  Darian Washington MD    ASSISTANT:  Mecca Dai CST    ANESTHESIA:  General.    ESTIMATED BLOOD LOSS FOR MY PORTION OF THE PROCEDURE:  10 mL. FLUIDS AT OPERATION:  2000 mL of crystalloid. CULTURES:  None. DRAINS:  Four #15 round HOSEA drains, two in the right breast, two in the left. IMPLANTS:  As noted above. SPECIMENS:  None. COMPLICATIONS:  None. CONDITION AT CLOSE OF PROCEDURE:  Stable. INDICATIONS:  The patient is a patient of Dr. Mellisa De Jesus who was found to have carcinoma of the breast and has elected to proceed with bilateral mastectomies and desired immediate reconstruction. The various options available were reviewed with her and she has elected to proceed with placement of prepectoral tissue expanders with an acellular dermal matrix wrap in a nipple-sparing approach. The risks and benefits were reviewed with her extensively and her questions were answered. She states she understands the risks of the procedure to include but not be limited to:  Bleeding, infection, scars, asymmetry, poor cosmetic result, loss of part  or all of the mastectomy flaps, implant malposition, deflation, migration, extrusion or encapsulation resulting in a firm, painful or distorted breast, hematoma or seroma formation, deep venous thrombosis, pulmonary emboli, the need for transfusion, the need for hospitalization and the remote risk of death.   Understanding these issues, she wished to proceed. PROCEDURE:  The patient was met prior to surgery where in the upright position, the midline was marked from the sternal notch to the umbilicus. The inframammary folds were marked bilaterally. An upper periareolar incision was then designed with a lateral extension bilaterally. Please see Dr. Dr. Jackson's notes for full details regarding position, prep, drape, mastectomy and sentinel node biopsy. After completion of the mastectomies, the frozen sections revealed the margins and the lymph node to be negative. At this point, new sterile towels were placed. The pockets were irrigated with vancomycin solution. The tissue expanders were opened and placed on the sterile table. The sheets of AlloDerm Select 16 x 20 perforated thick material, left lot number [employer] [id number], right lot number [employer] [id number] were rinsed in sterile saline for 5 minutes and then rinsed in vancomycin solution for approximately 5 minutes. They were properly oriented to cover the expander and then the excess was tacked on the back to maintain the AlloDerm being wrapped around the implant. Small slits were made along the AlloDerm sheet to allow the suture tabs to protrude through. At this point, the air was removed and the expander was rinsed with Betadine and vancomycin solution. The expanders were then positioned in the mastectomy defect taking care to seat the base of the expander at the level of the inframammary fold. The suture tabs were then fixed to the chest wall circumferentially. The fill valves were then accessed with the enclosed 23-gauge butterfly and the expanders were filled to 420 mL bilaterally. This created minimal tension on the skin. At this point, two 15 round HOSEA drains were placed on each side and brought out through separate stab wounds and secured at the skin with 3-0 nylon. One drain was placed along the axilla, the other was placed across the inframammary fold.   At this point, 10 mL of 0.25% Marcaine plain was placed in each pocket. Please note, prior to placing the expander, 10 mL of 0.25% Marcaine plain was injected between the pec major and the pec minor. At this point, the incision was closed with a running 3-0 Vicryl in the deep dermis followed by a 4-0 Monocryl subcuticular suture. The flaps appeared well vascularized. The incisions were then covered with antibiotic ointment, Xeroform gauze, dry sterile gauze and a bra. Biopatch chlorhexidine impregnated discs were placed over the base of the drains where they exited the skin and were covered with Tegaderm. The patient was placed in a bra, awakened, extubated, and transferred to the recovery room in stable condition.       Deb García MD      [id number]  D:  08/20/2020 19:45  T:  08/21/2020 13:08  JOB #:  [id number]  CC:  Siri Palacios MD

## 2020-08-21 NOTE — OP NOTES
[place]  OPERATIVE REPORT    Name:  Shirley Wheeler  MR#:  [id number]  :  1950  ACCOUNT #:  [de-identified]  DATE OF SERVICE:  2020    PREOPERATIVE DIAGNOSES:  1. Right breast cancer. 2.  Left breast prophylactic mastectomy, diagnosis code Z40.01. POSTOPERATIVE DIAGNOSES:  1. Right breast cancer. 2.  Left breast prophylactic mastectomy, diagnosis code Z40.01. PROCEDURE PERFORMED:  1. Left mastectomy, nipple-sparing, requiring twice as much time and effort, CPT code 21971-34.  2.  Right mastectomy, nipple-sparing, requiring twice as much time and effort, CPT code 17664-40. 3.  Right deep axillary dissection, CPT code 05710.  4.  Right sentinel lymph node biopsy, CPT code 59113. SURGEON:  Gustabo Cruz DO    ASSISTANT:  Dr. Carey Olea. ANESTHESIA:  General endotracheal.    COMPLICATIONS:  None. SPECIMENS REMOVED:  1. Left breast marked with a long lateral short superior suture. 2.  Right sentinel lymph node. 3.  Right breast marked with a long lateral short superior suture. IMPLANTS:  Per Dr. Carey Olea. ESTIMATED BLOOD LOSS:  Minimal.    DISPOSITION:  Stable. COUNTS:  Sponge count, needle count, and instrument count all correct x3. DESCRIPTION OF PROCEDURE:  This is a 69-year-old female diagnosed with right breast cancer. She was prepared for bilateral mastectomy using a nipple-sparing technique and sentinel lymph node biopsy on the right with frozen section. Consent was obtained by describing the procedure to the patient including potential complications to include infection, bleeding, possible axillary dissection, nerve damage, and lymphedema. Consent was obtained and placed on the chart. She was administered Ancef 2 g IV preoperatively, taken to the operative suite, and placed in the supine position. General anesthesia was initiated without complications. She was then prepped and draped in the usual sterile fashion.   Time-out was taken to confirm the patient name and proper procedure. Following this, the left breast incision was planned. Then, 0.25% Marcaine with epinephrine was used to anesthetize the skin and subcutaneous tissue, and then, a #15 scalpel blade was used to make a curvilinear incision on the superior aspect of the nipple extending laterally. Using Bovie cauterization, we dissected in the avascular plane while using skin hooks to elevate the skin. We dissected in the avascular plane to the clavicle superiorly, to the sternum medially. We then performed a nipple-sparing technique and sharply dissected beneath the nipple and then continued in the avascular plane down to the rectus muscle inferiorly. We then dissected the breast off of the pectoralis muscle from a medial to lateral orientation using spot cauterization. This entire procedure required twice as much time and effort than a typical mastectomy using a non-nipple-sparing technique. The breast was then removed and marked with a long lateral short superior suture and then sent to pathology for analysis. At this point, we copiously irrigated with saline until clear, ensured hemostasis using spot cauterization, placed a wet lap sponge into the wound for Dr. Julian Restrepo to perform the reconstruction. We then began planning for the right breast mastectomy. Then, 0.25% Marcaine with epinephrine was used to anesthetize the skin and subcutaneous tissue, and then a #15 scalpel blade was used to make a matching incision just on the superior aspect of the nipple extending laterally. Using Bovie cauterization and skin hooks to retract the skin, we dissected in the avascular plane to the clavicle superiorly and to the sternum medially. We then performed a sharp dissection on the nipple-sparing portion of the procedure, preserving the nipple, and then we dissected in the avascular plane inferiorly to the rectus muscle.   We then removed the breast from the pectoralis muscle using spot cauterization from a medial to lateral orientation. This process took twice as much time and effort as a typical mastectomy without a nipple-sparing technique. We continued our dissection into the axilla where we then obtained the Neoprobe and we identified a lymph node that was dissected free and sent to pathology as the sentinel lymph node. The lymph node was then measured on the back table and recorded a hot signal, and we waited for the pathologist to call with the frozen section. We continued our dissection dissecting the breast, marking with a long lateral and short superior suture and this was sent to pathology for analysis. We then copiously irrigated with saline until clear. We ensured hemostasis using spot cauterization. We then placed a wet lap sponge and waited for Dr. Candido Lewis to perform the reconstruction. Frozen section report from the pathologist revealed evidence of metastatic tumor within the lymph node on touch prep and a negative lymph node. At this point, my portion of the procedure was concluded. There was no evidence of metastatic spread or gross findings of tumor beyond the margins. FINDINGS:  A 39-year-old female with right breast cancer. Bilateral nipple-sparing mastectomies were performed without immediate complications. Dr. Candido Lewis performed prepectoral breast reconstruction. [place] lymph node was negative on frozen section and touch prep. There was no evidence of palpable lymphadenopathy or metastatic spread beyond tissue margins.         [address], DO      [id number]  D:  08/20/2020 15:09  T:  08/21/2020 1:51  JOB #:  [id number]

## 2020-09-30 ENCOUNTER — PATIENT OUTREACH (OUTPATIENT)
Dept: CASE MANAGEMENT | Age: 70
End: 2020-09-30

## 2020-09-30 NOTE — PROGRESS NOTES
9/30/2020 Saw the patient with Dr Anita Garcia. This is her initial visit with oncology for breast cancer. She is reluctant to consider the hormone blocking pill and would like to have some time to consider taking this. I have given her printed information regarding this medication. We will send oncotype. She will have her bone density updated. Navigation information given to the patient. Will continue to follow.

## 2020-10-05 ENCOUNTER — HOSPITAL ENCOUNTER (OUTPATIENT)
Dept: PHYSICAL THERAPY | Age: 70
Discharge: HOME | End: 2020-10-05
Payer: MEDICARE

## 2020-10-05 PROCEDURE — 97166 OT EVAL MOD COMPLEX 45 MIN: CPT

## 2020-10-05 PROCEDURE — 97140 MANUAL THERAPY 1/> REGIONS: CPT

## 2020-10-05 NOTE — THERAPY EVALUATION
Leonidas Fong  : 1950  Primary: [employer] Hmo  Secondary: [address] [facility] 200 Therapy  [address], [address]  [phone]   [phone]           OUTPATIENT OCCUPATIONAL THERAPY: Initial Assessment and Daily Note 10/5/2020    ICD-10: Treatment Diagnosis: I89.0 lymphedema not elsewhere specified, 189.026 lymphedema with diabetes, I10.0 hypertension   Precautions/Allergies:   Allegra allergy [fexofenadine]; Fexofenadine-pseudoephedrine; Ace inhibitors; and Zyrtec [cetirizine]   Fall Risk Score:    Ambulatory/Rehab Services H2 Model Falls Risk Assessment    Risk Factors:       No Risk Factors Identified Ability to Rise from Chair:       (0)  Ability to rise in a single movement    Falls Prevention Plan:       No modifications necessary0   Total: (5 or greater = High Risk): 0     [facility] of [employer]. All Rights Reserved. [facility] Patent #[id number]. Federal Law prohibits the replication, distribution or use without written permission from [facility] [employer]     MD Orders: eval and treat MEDICAL/REFERRING DIAGNOSIS:   Lymphedema, not elsewhere classified [I89.0]   DATE OF ONSET: chronic , >38 years  REFERRING PHYSICIAN: Margot [address] [place] [address],[address]: to be determined      INITIAL ASSESSMENT:  Ms. Jeremy Bruno was referred to OT for the evaluation and treatment of bilateral LE lymphedema chronic in nature. She states swelling began >38 years ago when she was pregnant with her first child. Her family has a history of LE swelling: grandmother/mother. She has never had therapy for lymphedema but has worn compression knee highs for many years (unsure of compression level but will bring with her to next appointment). Ms. Jeremy Bruno recently had bilateral mastectomy and that accompanied with COVI-19 quarantine lead to greater inactivity and weight gain which increased LE swelling.   Swelling does not improve with existing compression knee highs or when she elevates her legs. She presents with Stage 3 lymphedema which is nonpitting, does not improve with elevation and is accompanied by thickening of the skin/hyperkeratosis. Ms. Amelia Smith would benefit from skilled OT for complete decongestive therapy to decrease bilateral LE lymphedema before transitioning into appropriate compression garment to maintain limb volume. PLAN OF CARE:   PROBLEM LIST:  1. Decreased Activity Tolerance  2. Decreased Flexibility/Joint Mobility  3. Edema/Girth INTERVENTIONS PLANNED  1. Skin care  2. Compression bandaging  3. Fitting for compression garment(s)  4. Manual therapy/Manual lymph drainage  5. Therapeutic exercise/Therapeutic activities  6. Patient Education  7. Compression pump trial prn  8.  kinesiotaping    TREATMENT PLAN:  Effective Dates: 10/5/20 to 1/3/2021 Frequency/Duration: 2x/week up to 90 days  2 xweek x90 days  and upon reassessment. Will adjust frequency and duration as progress indicates. GOALS: (Goals have been discussed and agreed upon with patient.)  Short-Term Functional Goals: Time Frame: 45 days  1. The patient/caregiver will demonstrate understanding of lymphedema precautions. 2. Patient will be independent with skin care regimen to decrease risk of cellulitis. 3. The patient/caregiver will be independent with self-manual lymph drainage techniques and show decrease in limb volume. 5. Patient will be independent in lymphatic exercises. Discharge Goals: Time Frame: 90 days  1. Patient's bilateral lower extremity circumferential measurements will decrease on volumetric graph by 10-12cm to maximize functional use in ADL's.    2. The patient/caregiver will be independent with home management of lymphedema. 3. Patient/caregiver will be independent donning and doffing bilateral lower extremity compression garment.     Rehabilitation Potential For Stated Goals:good  Regarding Arturo Sandoval's therapy, I certify that the treatment plan above will be carried out by a therapist or under their direction. Thank you for this referral,  Charly Eaton OT     Referring Physician Signature: Gallo Coon* _________________________  Date _________            The information in this section was collected on 10/5/2020   (except where otherwise noted). OCCUPATIONAL PROFILE & HISTORY:   History of Present Injury/Illness (Reason for Referral):  Ms. Bacilio Eisenberg was referred to OT for the evaluation and treatment of bilateral LE lymphedema chronic in nature. She states swelling began >38 years ago when she was pregnant with her first child. Her family has a history of LE swelling: grandmother/mother. She has never had therapy for lymphedema but has worn compression knee highs for many years (unsure of compression level but will bring with her to next appointment). Ms. Bacilio Eisenberg recently had bilateral mastectomy and [facility] lead to greater inactivity and weight gain which worsened LE swelling. Swelling does not improve with existing compression knee highs or when she elevates her legs. She presents with Stage 3 lymphedema which is nonpitting, does not improve with elevation and is accompanied by thickening of the skin/hyperkeratosis. Ms. Baiclio Eisenberg would benefit from skilled OT for complete decongestive therapy to decrease bilateral LE lymphedema before transitioning into appropriate compression garment to maintain limb volume. Past Medical History/Comorbidities:   Ms. Bacilio Eisenberg  has a past medical history of Arthritis, Breast cancer ([facility] [address] Chronically dry eyes, bilateral, Colon polyps, GERD (gastroesophageal reflux disease), Gout, Hiatal hernia, Hypertension, Liver disease, Prediabetes, and Snores.   Ms. Bacilio Eisenberg  has a past surgical history that includes hx hysterectomy (2003); hx cataract removal (Bilateral); hx bladder suspension; hx dilation and curettage; hx colonoscopy (2006); hx mastectomy (Bilateral, 8/20/2020); and hx breast reconstruction (Bilateral, 8/20/2020). Social History/Living Environment:    pt lives in her own home with her spouse - she has supportive adult aged children/daughter is a nurse   Prior Level of Function/Work/Activity:  Retired - lifestyle has become more sedentary after breast surgery and with COVID-19 quarantine   Dominant Side:         RIGHT  Previous Treatment Approaches:          No therapy to date for lymphedema but has worn compression knee highs for many years   Current Medications:    Current Outpatient Medications:     anastrozole (Arimidex) 1 mg tablet, Take 1 mg by mouth daily. , Disp: 30 Tab, Rfl: 5    amLODIPine (NORVASC) 10 mg tablet, , Disp: , Rfl:     atorvastatin (LIPITOR) 40 mg tablet, , Disp: , Rfl:     carvediloL (COREG) 12.5 mg tablet, , Disp: , Rfl:     clopidogreL (PLAVIX) 75 mg tab, , Disp: , Rfl:     escitalopram oxalate (LEXAPRO) 20 mg tablet, , Disp: , Rfl:     HYDROcodone-acetaminophen (NORCO) 7.5-325 mg per tablet, TK 1-2 TS PO Q 4-6 H PRF PAIN, Disp: , Rfl:     losartan (COZAAR) 100 mg tablet, , Disp: , Rfl:     meloxicam (MOBIC) 7.5 mg tablet, , Disp: , Rfl:     furosemide (Lasix) 20 mg tablet, Take 20 mg by mouth daily. , Disp: , Rfl:     NIFEdipine ER (PROCARDIA XL) 90 mg ER tablet, Take 90 mg by mouth daily. , Disp: , Rfl:     metoprolol succinate (TOPROL-XL) 50 mg XL tablet, Take 50 mg by mouth daily. , Disp: , Rfl:     omeprazole (PRILOSEC) 40 mg capsule, Take 40 mg by mouth daily. , Disp: , Rfl:     pravastatin (PravachoL) 40 mg tablet, Take 40 mg by mouth nightly., Disp: , Rfl:     diclofenac (VOLTAREN) 1 % gel, Apply 4 g to affected area four (4) times daily as needed for Pain., Disp: , Rfl:     cholecalciferol (Vitamin D3) 25 mcg (1,000 unit) cap, Take 1,000 Units by mouth daily. , Disp: , Rfl:     polysorbate 80/glycerin (REFRESH DRY EYE THERAPY OP), Apply  to eye daily as needed. , Disp: , Rfl:     aspirin (ASPIRIN) 325 mg tablet, Take 325 mg by mouth daily. , Disp: , Rfl:             Date Last Reviewed:  10/5/2020   Complexity Level : Expanded review of therapy/medical records (1-2):  MODERATE COMPLEXITY   ASSESSMENT OF OCCUPATIONAL PERFORMANCE:   Palpation:          Bilateral LE lymphedema with swelling from foot to knee: stage 3 : non pitting - legs are very dense and \"full\"  ROM:          WFL but with stiffness from OA   Strength:          WFL  Special Tests:          Stemmer sign positive bilaterally   Skin Integrity:          Skin is intact but with hyperkeratosis, dry/scaly skin feet (pt has difficulty reaching), skin is taut due to swelling   Sensation: diabetic polyneuroapthy    Functional Mobility:  Independent but with decreased tolerance for task  Activities of Daily Living: modified independence with extra time and adaptive equipment   Edema/Girth:  Stage 3 non pitting   PRETREATMENT AFFECTED LIMB(s): right and left LE      Date:  10-5-20         Right / Left           Groin   []      []           8 inches   []      []           4 inches   []      []         PoplitealSpace   [x]      [x] 50/49.5          8 inches   [x]      [x] 52/53          4 inches   [x]      [x] 40/52          Ankle   [x]      [x] 33.5/34          Instep   [x]      [x] 23/23        Measurements are taken in centimeters:  2.54 cm = 1 inch  Total: 198.5         211.5              Physical Skills Involved:  1. Activity Tolerance  2. Edema  3. Skin Integrity Cognitive Skills Affected (resulting in the inability to perform in a timely and safe manner):  1. Psychosocial Skills Affected:  1. Habits/Routines   Number of elements that affect the Plan of Care: 3-5:  MODERATE COMPLEXITY   CLINICAL DECISION MAKING:   Outcome Measure: Tool Used: Tool Used: Lymphedema Life Impact Scale   Score:  Initial: 48 Most Recent: X (Date: -- )   Interpretation of Score:  The Lymphedema Life Impact Scale (LLIS) is a validated instrument that measures the physical, functional, and psychosocial concerns pertinent to patients with extremity lymphedema. The Scale's questionnaire is administered to patients to gauge impairments, activity limitations, and participation restrictions resulting from their lymphedema. Score 0 1-13 14-26 27-40 41-54 55-67 68   Modifier CH CI CJ CK CL CM CN     ? Other PT/OT Primary Functional Limitations:     - CURRENT STATUS: CL - 60%-79% impaired, limited or restricted    - GOAL STATUS: CK - 40%-59% impaired, limited or restricted    - D/C STATUS:  ---------------To be determined---------------       Medical Necessity:   · Skilled intervention continues to be required due to uncontrolled swelling increasing risk of cellulitis and hindering ADL's. Reason for Services/Other Comments:  · Patient continues to require skilled intervention due to inability to self manage lymphedema at this time. Clinical Decision-Making Assessment:     Use of outcome tool(s) and clinical judgement create a POC that gives a: Questionable prediction of patient's progress: MODERATE COMPLEXITY   TREATMENT:   (In addition to Assessment/Re-Assessment sessions the following treatments were rendered)    Pre-treatment Symptoms/Complaints:  Stage 3 non pitting bilateral LE lymphedema  Pain: Initial:   Pain Intensity 1: 3  Post Session:  3   Occupational Therapy Treatments:    OT eval(  ) OT eval was completed. Pt received information on lymphedema and risk reduction/self management practices as outlined by the National Lymphedema Network. Therapeutic Exercise ( minutes):     HEP:  As above; handouts given to patient for all exercises. Manual Therapy: (30 min): Pt was educated on lymph pathology, CDT, skin integrity management and precautions. Skin care treatment with Eucerin and application of 5\"surgi  bilaterally.            Manual Lymph Drainage:          Lymph Nodes:    Cervical Supraclavicular Axillary Abdominal Inguinal Popliteal Antecubital RIGHT []     []     []     []     []     []     []       LEFT []     []     []     []     []     []     []          Anastamoses:   Axillo-axillary Inguino-inguinal Axillo-inguinal Inguino-axillary   ANTERIOR []     []     []     []       POSTERIOR []     []     []     []       RIGHT []     []     []     []       LEFT []     []     []     []         Limbs:   []    RUE     []    LUE     [x]    RLE    [x]    LLE   Compression:     Treatment/Session Assessment:    · Response to Treatment:  Pt's goal for therapy is to decrease LE swelling so she can be more active again. She agrees with POC. · Compliance with Program/Exercises: will assess as treatment progresses  · Recommendations/Intent for next treatment session: \"Next visit will focus on complete decongestive therapy to decrease bilateral LE lymphedema \".   Total Treatment Duration:60min  OT Patient Time In/Time Out  Time In: 1100  Time Out: 183 [facility]

## 2020-10-09 ENCOUNTER — HOSPITAL ENCOUNTER (OUTPATIENT)
Dept: PHYSICAL THERAPY | Age: 70
Discharge: HOME | End: 2020-10-09
Payer: MEDICARE

## 2020-10-09 PROCEDURE — 97140 MANUAL THERAPY 1/> REGIONS: CPT

## 2020-10-09 NOTE — PROGRESS NOTES
Jacki Arias  : 1950  Primary: [employer]  Secondary: [facility]  [address], [facility], [address]  [phone]   [phone]           OUTPATIENT OCCUPATIONAL THERAPY: Daily Note 10/9/2020    ICD-10: Treatment Diagnosis: I89.0 lymphedema not elsewhere specified, 189.026 lymphedema with diabetes, I10.0 hypertension   Precautions/Allergies:   Allegra allergy [fexofenadine]; Fexofenadine-pseudoephedrine; Ace inhibitors; and Zyrtec [cetirizine]   Fall Risk Score:    Ambulatory/Rehab Services H2 Model Falls Risk Assessment    Risk Factors:       No Risk Factors Identified Ability to Rise from Chair:       (0)  Ability to rise in a single movement    Falls Prevention Plan:       No modifications necessary0   Total: (5 or greater = High Risk): 0     [facility] [employer]. All Rights Reserved. [facility] Patent #[id number]. Federal Law prohibits the replication, distribution or use without written permission from [facility] [employer]     MD Orders: eval and treat MEDICAL/REFERRING DIAGNOSIS:   Lymphedema, not elsewhere classified [I89.0]   DATE OF ONSET: chronic , >38 years  REFERRING PHYSICIAN: [facility]: to be determined      INITIAL ASSESSMENT:  Ms. Fabiana Osuna was referred to OT for the evaluation and treatment of bilateral LE lymphedema chronic in nature. She states swelling began >38 years ago when she was pregnant with her first child. Her family has a history of LE swelling: grandmother/mother. She has never had therapy for lymphedema but has worn compression knee highs for many years (unsure of compression level but will bring with her to next appointment). Ms. Fabiana Osuna recently had bilateral mastectomy and that accompanied with COVI-19 quarantine lead to greater inactivity and weight gain which increased LE swelling.   Swelling does not improve with existing compression knee highs or when she elevates her legs. She presents with Stage 3 lymphedema which is nonpitting, does not improve with elevation and is accompanied by thickening of the skin/hyperkeratosis. Ms. See Moore would benefit from skilled OT for complete decongestive therapy to decrease bilateral LE lymphedema before transitioning into appropriate compression garment to maintain limb volume. PLAN OF CARE:   PROBLEM LIST:  1. Decreased Activity Tolerance  2. Decreased Flexibility/Joint Mobility  3. Edema/Girth INTERVENTIONS PLANNED  1. Skin care  2. Compression bandaging  3. Fitting for compression garment(s)  4. Manual therapy/Manual lymph drainage  5. Therapeutic exercise/Therapeutic activities  6. Patient Education  7. Compression pump trial prn  8.  kinesiotaping    TREATMENT PLAN:  Effective Dates: 10/5/20 to 1/3/2021 Frequency/Duration: 2x/week up to 90 days  2 xweek x90 days  and upon reassessment. Will adjust frequency and duration as progress indicates. GOALS: (Goals have been discussed and agreed upon with patient.)  Short-Term Functional Goals: Time Frame: 45 days  1. The patient/caregiver will demonstrate understanding of lymphedema precautions. 2. Patient will be independent with skin care regimen to decrease risk of cellulitis. 3. The patient/caregiver will be independent with self-manual lymph drainage techniques and show decrease in limb volume. 5. Patient will be independent in lymphatic exercises. Discharge Goals: Time Frame: 90 days  1. Patient's bilateral lower extremity circumferential measurements will decrease on volumetric graph by 10-12cm to maximize functional use in ADL's.    2. The patient/caregiver will be independent with home management of lymphedema. 3. Patient/caregiver will be independent donning and doffing bilateral lower extremity compression garment.     Rehabilitation Potential For Stated Goals:good              The information in this section was collected on 10/9/2020   (except where otherwise noted). OCCUPATIONAL PROFILE & HISTORY:   History of Present Injury/Illness (Reason for Referral):  Ms. Gold Becker was referred to OT for the evaluation and treatment of bilateral LE lymphedema chronic in nature. She states swelling began >38 years ago when she was pregnant with her first child. Her family has a history of LE swelling: grandmother/mother. She has never had therapy for lymphedema but has worn compression knee highs for many years (unsure of compression level but will bring with her to next appointment). Ms. Gold Becker recently had bilateral mastectomy and that accompanied with COVI-19 quarantine lead to greater inactivity and weight gain which worsened LE swelling. Swelling does not improve with existing compression knee highs or when she elevates her legs. She presents with Stage 3 lymphedema which is nonpitting, does not improve with elevation and is accompanied by thickening of the skin/hyperkeratosis. Ms. Gold Becker would benefit from skilled OT for complete decongestive therapy to decrease bilateral LE lymphedema before transitioning into appropriate compression garment to maintain limb volume. Past Medical History/Comorbidities:   Ms. Gold Becker  has a past medical history of Arthritis, Breast cancer ([address] Chronically dry eyes, bilateral, Colon polyps, GERD (gastroesophageal reflux disease), Gout, Hiatal hernia, Hypertension, Liver disease, Prediabetes, and Snores. Ms. Gold Becker  has a past surgical history that includes hx hysterectomy (2003); hx cataract removal (Bilateral); hx bladder suspension; hx dilation and curettage; hx colonoscopy (2006); hx mastectomy (Bilateral, 8/20/2020); and hx breast reconstruction (Bilateral, 8/20/2020).   Social History/Living Environment:    pt lives in her own home with her spouse - she has supportive adult aged children/daughter is a nurse   Prior Level of Function/Work/Activity:  Retired - lifestyle has become more sedentary after breast surgery and with COVID-19 quarantine   Dominant Side:         RIGHT  Previous Treatment Approaches:          No therapy to date for lymphedema but has worn compression knee highs for many years   Addendum: 10/9/20: At time of eval pt said she had never had therapy in the past but after thinking about it she said she remembered she did have therapy in [facility] several years ago for a short time and remembers MLD and compression bandaging - she also remembered and found Lenell Kami she had in the past that are in good shape  Current Medications:    Current Outpatient Medications:     anastrozole (Arimidex) 1 mg tablet, Take 1 mg by mouth daily. , Disp: 30 Tab, Rfl: 5    amLODIPine (NORVASC) 10 mg tablet, , Disp: , Rfl:     atorvastatin (LIPITOR) 40 mg tablet, , Disp: , Rfl:     carvediloL (COREG) 12.5 mg tablet, , Disp: , Rfl:     clopidogreL (PLAVIX) 75 mg tab, , Disp: , Rfl:     escitalopram oxalate (LEXAPRO) 20 mg tablet, , Disp: , Rfl:     HYDROcodone-acetaminophen (NORCO) 7.5-325 mg per tablet, TK 1-2 TS PO Q 4-6 H PRF PAIN, Disp: , Rfl:     losartan (COZAAR) 100 mg tablet, , Disp: , Rfl:     meloxicam (MOBIC) 7.5 mg tablet, , Disp: , Rfl:     furosemide (Lasix) 20 mg tablet, Take 20 mg by mouth daily. , Disp: , Rfl:     NIFEdipine ER (PROCARDIA XL) 90 mg ER tablet, Take 90 mg by mouth daily. , Disp: , Rfl:     metoprolol succinate (TOPROL-XL) 50 mg XL tablet, Take 50 mg by mouth daily. , Disp: , Rfl:     omeprazole (PRILOSEC) 40 mg capsule, Take 40 mg by mouth daily. , Disp: , Rfl:     pravastatin (PravachoL) 40 mg tablet, Take 40 mg by mouth nightly., Disp: , Rfl:     diclofenac (VOLTAREN) 1 % gel, Apply 4 g to affected area four (4) times daily as needed for Pain., Disp: , Rfl:     cholecalciferol (Vitamin D3) 25 mcg (1,000 unit) cap, Take 1,000 Units by mouth daily. , Disp: , Rfl:     polysorbate 80/glycerin (REFRESH DRY EYE THERAPY OP), Apply  to eye daily as needed. , Disp: , Rfl:     aspirin (ASPIRIN) 325 mg tablet, Take 325 mg by mouth daily. , Disp: , Rfl:             Date Last Reviewed:  10/9/2020   Complexity Level : Expanded review of therapy/medical records (1-2):  MODERATE COMPLEXITY   ASSESSMENT OF OCCUPATIONAL PERFORMANCE:   Palpation:          Bilateral LE lymphedema with swelling from foot to knee: stage 3 : non pitting - legs are very dense and \"full\"  ROM:          WFL but with stiffness from OA   Strength:          WFL  Special Tests:          Stemmer sign positive bilaterally   Skin Integrity:          Skin is intact but with hyperkeratosis, dry/scaly skin feet (pt has difficulty reaching), skin is taut due to swelling   Sensation: diabetic polyneuroapthy    Functional Mobility:  Independent but with decreased tolerance for task  Activities of Daily Living: modified independence with extra time and adaptive equipment   Edema/Girth:  Stage 3 non pitting   PRETREATMENT AFFECTED LIMB(s): right and left LE      Date:  10-5-20         Right / Left           Groin   []      []           8 inches   []      []           4 inches   []      []         PoplitealSpace   [x]      [x] 50/49.5          8 inches   [x]      [x] 52/53          4 inches   [x]      [x] 40/52          Ankle   [x]      [x] 33.5/34          Instep   [x]      [x] 23/23        Measurements are taken in centimeters:  2.54 cm = 1 inch  Total: 198.5         211.5              Physical Skills Involved:  1. Activity Tolerance  2. Edema  3. Skin Integrity Cognitive Skills Affected (resulting in the inability to perform in a timely and safe manner):  1. Psychosocial Skills Affected:  1. Habits/Routines   Number of elements that affect the Plan of Care: 3-5:  MODERATE COMPLEXITY   CLINICAL DECISION MAKING:   Outcome Measure: Tool Used: Tool Used: Lymphedema Life Impact Scale   Score:  Initial: 48 Most Recent: X (Date: -- )   Interpretation of Score:  The Lymphedema Life Impact Scale (LLIS) is a validated instrument that measures the physical, functional, and psychosocial concerns pertinent to patients with extremity lymphedema. The Scale's questionnaire is administered to patients to gauge impairments, activity limitations, and participation restrictions resulting from their lymphedema. Score 0 1-13 14-26 27-40 41-54 55-67 68   Modifier CH CI CJ CK CL CM CN     ? Other PT/OT Primary Functional Limitations:     - CURRENT STATUS: CL - 60%-79% impaired, limited or restricted    - GOAL STATUS: CK - 40%-59% impaired, limited or restricted    - D/C STATUS:  ---------------To be determined---------------       Medical Necessity:   · Skilled intervention continues to be required due to uncontrolled swelling increasing risk of cellulitis and hindering ADL's. Reason for Services/Other Comments:  · Patient continues to require skilled intervention due to inability to self manage lymphedema at this time. Clinical Decision-Making Assessment:     Use of outcome tool(s) and clinical judgement create a POC that gives a: Questionable prediction of patient's progress: MODERATE COMPLEXITY   TREATMENT:   (In addition to Assessment/Re-Assessment sessions the following treatments were rendered)    Pre-treatment Symptoms/Complaints:  Stage 3 non pitting bilateral LE lymphedema  Pain: Initial:   Pain Intensity 1: 3  Post Session:  3   Occupational Therapy Treatments:    OT eval(  ) OT eval was completed. Pt received information on lymphedema and risk reduction/self management practices as outlined by the National Lymphedema Network. Therapeutic Exercise ( minutes):     HEP:  As above; handouts given to patient for all exercises.   Manual Therapy: (60 min): Pt received MLD, skin care and compression bandaging          Manual Lymph Drainage:          Lymph Nodes:    Cervical Supraclavicular Axillary Abdominal Inguinal Popliteal Antecubital   RIGHT []     [x]     [x]     [x]     [x]     [x]     []       LEFT []     [x]     [x] [x]     [x]     [x]     []          Anastamoses:   Axillo-axillary Inguino-inguinal Axillo-inguinal Inguino-axillary   ANTERIOR []     []     []     [x]       POSTERIOR []     []     []     []       RIGHT []     []     []     [x]       LEFT []     []     []     [x]         Limbs:   []    RUE     []    LUE     [x]    RLE    [x]    LLE   Compression: After skin care, a multi layered compression bandage was applied bilaterally from foot to knee using 2 short stretch bandages, 5\" surgi  and lymphesoft foam. Pt instructed to remove if any medical complications ie SOB. She has existing Pearson Callander she can wear if she has any problems with bandages over the weekend. Treatment/Session Assessment:    · Response to Treatment:  Pt tolerated treatment session well with no medical complications. Skin was intact and addressed with Eucerin lotion. Compression bandaging initiated today. Pt remembered that she had CDT in [facility] several years ago and wore bandages for a short time. She also found Pearson Callander that are in good condition when she was looking though her compression collection. · Compliance with Program/Exercises: compliant to date   · Recommendations/Intent for next treatment session: \"Next visit will focus on complete decongestive therapy to decrease bilateral LE lymphedema \".   Total Treatment Duration:60min  OT Patient Time In/Time Out  Time In: 0900  Time Out: [address] [facility],[address], OT

## 2020-10-12 ENCOUNTER — HOSPITAL ENCOUNTER (OUTPATIENT)
Dept: PHYSICAL THERAPY | Age: 70
Discharge: HOME | End: 2020-10-12
Payer: MEDICARE

## 2020-10-12 PROCEDURE — 97140 MANUAL THERAPY 1/> REGIONS: CPT

## 2020-10-12 NOTE — PROGRESS NOTES
Matthew Zurita  : 1950  Primary: [employer]  Secondary: [facility]  [address], [address]  [phone]   [phone]           OUTPATIENT OCCUPATIONAL THERAPY: Daily Note 10/12/2020    ICD-10: Treatment Diagnosis: I89.0 lymphedema not elsewhere specified, 189.026 lymphedema with diabetes, I10.0 hypertension   Precautions/Allergies:   Allegra allergy [fexofenadine]; Fexofenadine-pseudoephedrine; Ace inhibitors; and Zyrtec [cetirizine]   Fall Risk Score:    Ambulatory/Rehab Services H2 Model Falls Risk Assessment    Risk Factors:       No Risk Factors Identified Ability to Rise from Chair:       (0)  Ability to rise in a single movement    Falls Prevention Plan:       No modifications necessary0   Total: (5 or greater = High Risk): 0     [facility] [employer]. All Rights Reserved. [facility] Patent #[id number]. Federal Law prohibits the replication, distribution or use without written permission from [facility]     MD Orders: eval and treat MEDICAL/REFERRING DIAGNOSIS:   Lymphedema, not elsewhere classified [I89.0]   DATE OF ONSET: chronic , >38 years  REFERRING PHYSICIAN: [facility]: to be determined      INITIAL ASSESSMENT:  Ms. Etienne Bird was referred to OT for the evaluation and treatment of bilateral LE lymphedema chronic in nature. She states swelling began >38 years ago when she was pregnant with her first child. Her family has a history of LE swelling: grandmother/mother. She has never had therapy for lymphedema but has worn compression knee highs for many years (unsure of compression level but will bring with her to next appointment). Ms. Etienne Bird recently had bilateral mastectomy and that accompanied with COVI-19 quarantine lead to greater inactivity and weight gain which increased LE swelling.   Swelling does not improve with existing compression knee highs or when she elevates her legs. She presents with Stage 3 lymphedema which is nonpitting, does not improve with elevation and is accompanied by thickening of the skin/hyperkeratosis. Ms. Isac Frias would benefit from skilled OT for complete decongestive therapy to decrease bilateral LE lymphedema before transitioning into appropriate compression garment to maintain limb volume. PLAN OF CARE:   PROBLEM LIST:  1. Decreased Activity Tolerance  2. Decreased Flexibility/Joint Mobility  3. Edema/Girth INTERVENTIONS PLANNED  1. Skin care  2. Compression bandaging  3. Fitting for compression garment(s)  4. Manual therapy/Manual lymph drainage  5. Therapeutic exercise/Therapeutic activities  6. Patient Education  7. Compression pump trial prn  8.  kinesiotaping    TREATMENT PLAN:  Effective Dates: 10/5/20 to 1/3/2021 Frequency/Duration: 2x/week up to 90 days  2 xweek x90 days  and upon reassessment. Will adjust frequency and duration as progress indicates. GOALS: (Goals have been discussed and agreed upon with patient.)  Short-Term Functional Goals: Time Frame: 45 days  1. The patient/caregiver will demonstrate understanding of lymphedema precautions. 2. Patient will be independent with skin care regimen to decrease risk of cellulitis. 3. The patient/caregiver will be independent with self-manual lymph drainage techniques and show decrease in limb volume. 5. Patient will be independent in lymphatic exercises. Discharge Goals: Time Frame: 90 days  1. Patient's bilateral lower extremity circumferential measurements will decrease on volumetric graph by 10-12cm to maximize functional use in ADL's.    2. The patient/caregiver will be independent with home management of lymphedema. 3. Patient/caregiver will be independent donning and doffing bilateral lower extremity compression garment.     Rehabilitation Potential For Stated Goals:good              The information in this section was collected on 10/12/2020   (except where otherwise noted). OCCUPATIONAL PROFILE & HISTORY:   History of Present Injury/Illness (Reason for Referral):  Ms. Jeremy Bruno was referred to OT for the evaluation and treatment of bilateral LE lymphedema chronic in nature. She states swelling began >38 years ago when she was pregnant with her first child. Her family has a history of LE swelling: grandmother/mother. She has never had therapy for lymphedema but has worn compression knee highs for many years (unsure of compression level but will bring with her to next appointment). Ms. Jeremy Bruno recently had bilateral mastectomy and that accompanied with COVI-19 quarantine lead to greater inactivity and weight gain which worsened LE swelling. Swelling does not improve with existing compression knee highs or when she elevates her legs. She presents with Stage 3 lymphedema which is nonpitting, does not improve with elevation and is accompanied by thickening of the skin/hyperkeratosis. Ms. Jeremy Bruno would benefit from skilled OT for complete decongestive therapy to decrease bilateral LE lymphedema before transitioning into appropriate compression garment to maintain limb volume. Past Medical History/Comorbidities:   Ms. Jeremy Bruno  has a past medical history of Arthritis, Breast cancer ([facility] [address] Chronically dry eyes, bilateral, Colon polyps, GERD (gastroesophageal reflux disease), Gout, Hiatal hernia, Hypertension, Liver disease, Prediabetes, and Snores. Ms. Jeremy Bruno  has a past surgical history that includes hx hysterectomy (2003); hx cataract removal (Bilateral); hx bladder suspension; hx dilation and curettage; hx colonoscopy (2006); hx mastectomy (Bilateral, 8/20/2020); and hx breast reconstruction (Bilateral, 8/20/2020).   Social History/Living Environment:    pt lives in her own home with her spouse - she has supportive adult aged children/daughter is a nurse   Prior Level of Function/Work/Activity:  Retired - lifestyle has become more sedentary after breast surgery and with COVID-19 quarantine   Dominant Side:         RIGHT  Previous Treatment Approaches:          No therapy to date for lymphedema but has worn compression knee highs for many years   Addendum: 10/9/20: At time of eval pt said she had never had therapy in the past but after thinking about it she said she remembered she did have therapy in [facility] several years ago for a short time and remembers MLD and compression bandaging - she also remembered and found Lenell Kami she had in the past that are in good shape  Current Medications:    Current Outpatient Medications:     anastrozole (Arimidex) 1 mg tablet, Take 1 mg by mouth daily. , Disp: 30 Tab, Rfl: 5    amLODIPine (NORVASC) 10 mg tablet, , Disp: , Rfl:     atorvastatin (LIPITOR) 40 mg tablet, , Disp: , Rfl:     carvediloL (COREG) 12.5 mg tablet, , Disp: , Rfl:     clopidogreL (PLAVIX) 75 mg tab, , Disp: , Rfl:     escitalopram oxalate (LEXAPRO) 20 mg tablet, , Disp: , Rfl:     HYDROcodone-acetaminophen (NORCO) 7.5-325 mg per tablet, TK 1-2 TS PO Q 4-6 H PRF PAIN, Disp: , Rfl:     losartan (COZAAR) 100 mg tablet, , Disp: , Rfl:     meloxicam (MOBIC) 7.5 mg tablet, , Disp: , Rfl:     furosemide (Lasix) 20 mg tablet, Take 20 mg by mouth daily. , Disp: , Rfl:     NIFEdipine ER (PROCARDIA XL) 90 mg ER tablet, Take 90 mg by mouth daily. , Disp: , Rfl:     metoprolol succinate (TOPROL-XL) 50 mg XL tablet, Take 50 mg by mouth daily. , Disp: , Rfl:     omeprazole (PRILOSEC) 40 mg capsule, Take 40 mg by mouth daily. , Disp: , Rfl:     pravastatin (PravachoL) 40 mg tablet, Take 40 mg by mouth nightly., Disp: , Rfl:     diclofenac (VOLTAREN) 1 % gel, Apply 4 g to affected area four (4) times daily as needed for Pain., Disp: , Rfl:     cholecalciferol (Vitamin D3) 25 mcg (1,000 unit) cap, Take 1,000 Units by mouth daily. , Disp: , Rfl:     polysorbate 80/glycerin (REFRESH DRY EYE THERAPY OP), Apply  to eye daily as needed. , Disp: , Rfl:     aspirin (ASPIRIN) 325 mg tablet, Take 325 mg by mouth daily. , Disp: , Rfl:             Date Last Reviewed:  10/12/2020   Complexity Level : Expanded review of therapy/medical records (1-2):  MODERATE COMPLEXITY   ASSESSMENT OF OCCUPATIONAL PERFORMANCE:   Palpation:          Bilateral LE lymphedema with swelling from foot to knee: stage 3 : non pitting - legs are very dense and \"full\"  ROM:          WFL but with stiffness from OA   Strength:          WFL  Special Tests:          Stemmer sign positive bilaterally   Skin Integrity:          Skin is intact but with hyperkeratosis, dry/scaly skin feet (pt has difficulty reaching), skin is taut due to swelling   Sensation: diabetic polyneuroapthy    Functional Mobility:  Independent but with decreased tolerance for task  Activities of Daily Living: modified independence with extra time and adaptive equipment   Edema/Girth:  Stage 3 non pitting   PRETREATMENT AFFECTED LIMB(s): right and left LE      Date:  10-5-20         Right / Left           Groin   []      []           8 inches   []      []           4 inches   []      []         PoplitealSpace   [x]      [x] 50/49.5          8 inches   [x]      [x] 52/53          4 inches   [x]      [x] 40/52          Ankle   [x]      [x] 33.5/34          Instep   [x]      [x] 23/23        Measurements are taken in centimeters:  2.54 cm = 1 inch  Total: 198.5         211.5              Physical Skills Involved:  1. Activity Tolerance  2. Edema  3. Skin Integrity Cognitive Skills Affected (resulting in the inability to perform in a timely and safe manner):  1. Psychosocial Skills Affected:  1. Habits/Routines   Number of elements that affect the Plan of Care: 3-5:  MODERATE COMPLEXITY   CLINICAL DECISION MAKING:   Outcome Measure: Tool Used: Tool Used: Lymphedema Life Impact Scale   Score:  Initial: 48 Most Recent: X (Date: -- )   Interpretation of Score:  The Lymphedema Life Impact Scale (LLIS) is a validated instrument that measures the physical, functional, and psychosocial concerns pertinent to patients with extremity lymphedema. The Scale's questionnaire is administered to patients to gauge impairments, activity limitations, and participation restrictions resulting from their lymphedema. Score 0 1-13 14-26 27-40 41-54 55-67 68   Modifier CH CI CJ CK CL CM CN     ? Other PT/OT Primary Functional Limitations:     - CURRENT STATUS: CL - 60%-79% impaired, limited or restricted    - GOAL STATUS: CK - 40%-59% impaired, limited or restricted    - D/C STATUS:  ---------------To be determined---------------       Medical Necessity:   · Skilled intervention continues to be required due to uncontrolled swelling increasing risk of cellulitis and hindering ADL's. Reason for Services/Other Comments:  · Patient continues to require skilled intervention due to inability to self manage lymphedema at this time. Clinical Decision-Making Assessment:     Use of outcome tool(s) and clinical judgement create a POC that gives a: Questionable prediction of patient's progress: MODERATE COMPLEXITY   TREATMENT:   (In addition to Assessment/Re-Assessment sessions the following treatments were rendered)    Pre-treatment Symptoms/Complaints:  Stage 3 non pitting bilateral LE lymphedema - patient reports bandages stayed in place until yesterday. She was encouraged by this. Pain: Initial:   Pain Intensity 1: 3  Post Session:  3   Occupational Therapy Treatments:    OT eval(  ) OT eval was completed. Pt received information on lymphedema and risk reduction/self management practices as outlined by the National Lymphedema Network. Therapeutic Exercise ( minutes):     HEP:  As above; handouts given to patient for all exercises.   Manual Therapy: (60 min): Pt received MLD in conjunction with trial of compression pump, skin care and compression bandaging to BLE's          Manual Lymph Drainage:          Lymph Nodes:    Cervical Supraclavicular Axillary Abdominal Inguinal Popliteal Antecubital   RIGHT []     [x]     [x]     [x]     [x]     [x]     []       LEFT []     [x]     [x]     [x]     [x]     [x]     []          Anastamoses:   Axillo-axillary Inguino-inguinal Axillo-inguinal Inguino-axillary   ANTERIOR []     []     []     [x]       POSTERIOR []     []     []     []       RIGHT []     []     []     [x]       LEFT []     []     []     [x]         Limbs:   []    RUE     []    LUE     [x]    RLE    [x]    LLE   Compression: After skin care, a multi layered compression bandage was applied bilaterally from foot to knee using 2 short stretch bandages, 5\" surgi  and lymphesoft foam. Pt instructed to remove if any medical complications ie SOB. She has existing Harrie Aaron she can wear if she has any problems with bandages over the weekend. Treatment/Session Assessment:    · Response to Treatment:  Pt tolerated treatment session well with no medical complications. Skin was intact and addressed with Eucerin lotion. · Compliance with Program/Exercises: compliant to date   · Recommendations/Intent for next treatment session: \"Next visit will focus on complete decongestive therapy to decrease bilateral LE lymphedema \".   Total Treatment Duration:60min  OT Patient Time In/Time Out  Time In: 1100  Time Out: [address],

## 2020-10-16 ENCOUNTER — HOSPITAL ENCOUNTER (OUTPATIENT)
Dept: PHYSICAL THERAPY | Age: 70
Discharge: HOME | End: 2020-10-16
Payer: MEDICARE

## 2020-10-16 PROCEDURE — 97140 MANUAL THERAPY 1/> REGIONS: CPT

## 2020-10-16 NOTE — PROGRESS NOTES
Carey Drain  : 1950  Primary: [employer]  Secondary: [facility] Therapy  [address], [address]  [phone]   [phone]           OUTPATIENT OCCUPATIONAL THERAPY: Daily Note 10/16/2020    ICD-10: Treatment Diagnosis: I89.0 lymphedema not elsewhere specified, 189.026 lymphedema with diabetes, I10.0 hypertension   Precautions/Allergies:   Allegra allergy [fexofenadine]; Fexofenadine-pseudoephedrine; Ace inhibitors; and Zyrtec [cetirizine]   Fall Risk Score:    Ambulatory/Rehab Services H2 Model Falls Risk Assessment    Risk Factors:       No Risk Factors Identified Ability to Rise from Chair:       (0)  Ability to rise in a single movement    Falls Prevention Plan:       No modifications necessary0   Total: (5 or greater = High Risk): 0     [facility] [employer]. All Rights Reserved. [facility] Patent #[id number]. Federal Law prohibits the replication, distribution or use without written permission from [facility] [employer]     MD Orders: eval and treat MEDICAL/REFERRING DIAGNOSIS:   Lymphedema, not elsewhere classified [I89.0]   DATE OF ONSET: chronic , >38 years  REFERRING PHYSICIAN: [facility]: to be determined      INITIAL ASSESSMENT:  Ms. Ileana Ha was referred to OT for the evaluation and treatment of bilateral LE lymphedema chronic in nature. She states swelling began >38 years ago when she was pregnant with her first child. Her family has a history of LE swelling: grandmother/mother. She has never had therapy for lymphedema but has worn compression knee highs for many years (unsure of compression level but will bring with her to next appointment). Ms. Ileana Ha recently had bilateral mastectomy and that accompanied with COVI-19 quarantine lead to greater inactivity and weight gain which increased LE swelling.   Swelling does not improve with existing compression knee highs or when she elevates her legs. She presents with Stage 3 lymphedema which is nonpitting, does not improve with elevation and is accompanied by thickening of the skin/hyperkeratosis. Ms. Melanie Cloud would benefit from skilled OT for complete decongestive therapy to decrease bilateral LE lymphedema before transitioning into appropriate compression garment to maintain limb volume. PLAN OF CARE:   PROBLEM LIST:  1. Decreased Activity Tolerance  2. Decreased Flexibility/Joint Mobility  3. Edema/Girth INTERVENTIONS PLANNED  1. Skin care  2. Compression bandaging  3. Fitting for compression garment(s)  4. Manual therapy/Manual lymph drainage  5. Therapeutic exercise/Therapeutic activities  6. Patient Education  7. Compression pump trial prn  8.  kinesiotaping    TREATMENT PLAN:  Effective Dates: 10/5/20 to 1/3/2021 Frequency/Duration: 2x/week up to 90 days  2 xweek x90 days  and upon reassessment. Will adjust frequency and duration as progress indicates. GOALS: (Goals have been discussed and agreed upon with patient.)  Short-Term Functional Goals: Time Frame: 45 days  1. The patient/caregiver will demonstrate understanding of lymphedema precautions. 2. Patient will be independent with skin care regimen to decrease risk of cellulitis. 3. The patient/caregiver will be independent with self-manual lymph drainage techniques and show decrease in limb volume. 5. Patient will be independent in lymphatic exercises. Discharge Goals: Time Frame: 90 days  1. Patient's bilateral lower extremity circumferential measurements will decrease on volumetric graph by 10-12cm to maximize functional use in ADL's.    2. The patient/caregiver will be independent with home management of lymphedema. 3. Patient/caregiver will be independent donning and doffing bilateral lower extremity compression garment.     Rehabilitation Potential For Stated Goals:good              The information in this section was collected on 10/16/2020   (except where otherwise noted). OCCUPATIONAL PROFILE & HISTORY:   History of Present Injury/Illness (Reason for Referral):  Ms. Etienne Bird was referred to OT for the evaluation and treatment of bilateral LE lymphedema chronic in nature. She states swelling began >38 years ago when she was pregnant with her first child. Her family has a history of LE swelling: grandmother/mother. She has never had therapy for lymphedema but has worn compression knee highs for many years (unsure of compression level but will bring with her to next appointment). Ms. Etienne Bird recently had bilateral mastectomy and that accompanied with COVI-19 quarantine lead to greater inactivity and weight gain which worsened LE swelling. Swelling does not improve with existing compression knee highs or when she elevates her legs. She presents with Stage 3 lymphedema which is nonpitting, does not improve with elevation and is accompanied by thickening of the skin/hyperkeratosis. Ms. Etienne Bird would benefit from skilled OT for complete decongestive therapy to decrease bilateral LE lymphedema before transitioning into appropriate compression garment to maintain limb volume. Past Medical History/Comorbidities:   Ms. Etienne Bird  has a past medical history of Arthritis, Breast cancer ([facility] [address] Chronically dry eyes, bilateral, Colon polyps, GERD (gastroesophageal reflux disease), Gout, Hiatal hernia, Hypertension, Liver disease, Prediabetes, and Snores. Ms. Etienne Bird  has a past surgical history that includes hx hysterectomy (2003); hx cataract removal (Bilateral); hx bladder suspension; hx dilation and curettage; hx colonoscopy (2006); hx mastectomy (Bilateral, 8/20/2020); and hx breast reconstruction (Bilateral, 8/20/2020).   Social History/Living Environment:    pt lives in her own home with her spouse - she has supportive adult aged children/daughter is a nurse   Prior Level of Function/Work/Activity:  Retired - lifestyle has become more sedentary after breast surgery and with COVID-19 quarantine   Dominant Side:         RIGHT  Previous Treatment Approaches:          No therapy to date for lymphedema but has worn compression knee highs for many years   Addendum: 10/9/20: At time of eval pt said she had never had therapy in the past but after thinking about it she said she remembered she did have therapy in [place] several years ago for a short time and remembers MLD and compression bandaging - she also remembered and found Drema Sheen she had in the past that are in good shape  Current Medications:    Current Outpatient Medications:     anastrozole (Arimidex) 1 mg tablet, Take 1 mg by mouth daily. , Disp: 30 Tab, Rfl: 5    amLODIPine (NORVASC) 10 mg tablet, , Disp: , Rfl:     atorvastatin (LIPITOR) 40 mg tablet, , Disp: , Rfl:     carvediloL (COREG) 12.5 mg tablet, , Disp: , Rfl:     clopidogreL (PLAVIX) 75 mg tab, , Disp: , Rfl:     escitalopram oxalate (LEXAPRO) 20 mg tablet, , Disp: , Rfl:     HYDROcodone-acetaminophen (NORCO) 7.5-325 mg per tablet, TK 1-2 TS PO Q 4-6 H PRF PAIN, Disp: , Rfl:     losartan (COZAAR) 100 mg tablet, , Disp: , Rfl:     meloxicam (MOBIC) 7.5 mg tablet, , Disp: , Rfl:     furosemide (Lasix) 20 mg tablet, Take 20 mg by mouth daily. , Disp: , Rfl:     NIFEdipine ER (PROCARDIA XL) 90 mg ER tablet, Take 90 mg by mouth daily. , Disp: , Rfl:     metoprolol succinate (TOPROL-XL) 50 mg XL tablet, Take 50 mg by mouth daily. , Disp: , Rfl:     omeprazole (PRILOSEC) 40 mg capsule, Take 40 mg by mouth daily. , Disp: , Rfl:     pravastatin (PravachoL) 40 mg tablet, Take 40 mg by mouth nightly., Disp: , Rfl:     diclofenac (VOLTAREN) 1 % gel, Apply 4 g to affected area four (4) times daily as needed for Pain., Disp: , Rfl:     cholecalciferol (Vitamin D3) 25 mcg (1,000 unit) cap, Take 1,000 Units by mouth daily. , Disp: , Rfl:     polysorbate 80/glycerin (REFRESH DRY EYE THERAPY OP), Apply  to eye daily as needed. , Disp: , Rfl:     aspirin (ASPIRIN) 325 mg tablet, Take 325 mg by mouth daily. , Disp: , Rfl:             Date Last Reviewed:  10/16/2020   Complexity Level : Expanded review of therapy/medical records (1-2):  MODERATE COMPLEXITY   ASSESSMENT OF OCCUPATIONAL PERFORMANCE:   Palpation:          Bilateral LE lymphedema with swelling from foot to knee: stage 3 : non pitting - legs are very dense and \"full\"  ROM:          WFL but with stiffness from OA   Strength:          WFL  Special Tests:          Stemmer sign positive bilaterally   Skin Integrity:          Skin is intact but with hyperkeratosis, dry/scaly skin feet (pt has difficulty reaching), skin is taut due to swelling   Sensation: diabetic polyneuroapthy    Functional Mobility:  Independent but with decreased tolerance for task  Activities of Daily Living: modified independence with extra time and adaptive equipment   Edema/Girth:  Stage 3 non pitting   PRETREATMENT AFFECTED LIMB(s): right and left LE      Date:  10-5-20 10-16-20        Right / Left           Groin   []      []           8 inches   []      []           4 inches   []      []         PoplitealSpace   [x]      [x] 50/49.5 49.5/49         8 inches   [x]      [x] 52/53 49/52         4 inches   [x]      [x] 40/52 36/48         Ankle   [x]      [x] 33.5/34 31.5/33         Instep   [x]      [x] 23/23 22/22       Measurements are taken in centimeters:  2.54 cm = 1 inch  Total: 198.5 188.0        211.5 204.0             Physical Skills Involved:  1. Activity Tolerance  2. Edema  3. Skin Integrity Cognitive Skills Affected (resulting in the inability to perform in a timely and safe manner):  1. Psychosocial Skills Affected:  1. Habits/Routines   Number of elements that affect the Plan of Care: 3-5:  MODERATE COMPLEXITY   CLINICAL DECISION MAKING:   Outcome Measure: Tool Used: Tool Used: Lymphedema Life Impact Scale   Score:  Initial: 48 Most Recent: X (Date: -- )   Interpretation of Score:  The Lymphedema Life Impact Scale (LLIS) is a validated instrument that measures the physical, functional, and psychosocial concerns pertinent to patients with extremity lymphedema. The Scale's questionnaire is administered to patients to gauge impairments, activity limitations, and participation restrictions resulting from their lymphedema. Score 0 1-13 14-26 27-40 41-54 55-67 68   Modifier CH CI CJ CK CL CM CN     ? Other PT/OT Primary Functional Limitations:     - CURRENT STATUS: CL - 60%-79% impaired, limited or restricted    - GOAL STATUS: CK - 40%-59% impaired, limited or restricted    - D/C STATUS:  ---------------To be determined---------------       Medical Necessity:   · Skilled intervention continues to be required due to uncontrolled swelling increasing risk of cellulitis and hindering ADL's. Reason for Services/Other Comments:  · Patient continues to require skilled intervention due to inability to self manage lymphedema at this time. Clinical Decision-Making Assessment:     Use of outcome tool(s) and clinical judgement create a POC that gives a: Questionable prediction of patient's progress: MODERATE COMPLEXITY   TREATMENT:   (In addition to Assessment/Re-Assessment sessions the following treatments were rendered)    Pre-treatment Symptoms/Complaints:  Overall total of circumferential measurements decreaed by 10.5cm in the RLE and 7.5cm in the LLE. Pt states her \"legs feel so much better\". Pain: Initial:   Pain Intensity 1: 2  Post Session:  3   Occupational Therapy Treatments:    OT eval(  ) OT eval was completed. Pt received information on lymphedema and risk reduction/self management practices as outlined by the National Lymp[employer] Network. Therapeutic Exercise ( minutes):     HEP:  As above; handouts given to patient for all exercises.   Manual Therapy: (60 min): Pt received MLD in conjunction with trial of compression pump, skin care and compression bandaging to BLE's          Manual Lymph Drainage:          Lymph Nodes:    Cervical Supraclavicular Axillary Abdominal Inguinal Popliteal Antecubital   RIGHT []     [x]     [x]     [x]     [x]     [x]     []       LEFT []     [x]     [x]     [x]     [x]     [x]     []          Anastamoses:   Axillo-axillary Inguino-inguinal Axillo-inguinal Inguino-axillary   ANTERIOR []     []     []     [x]       POSTERIOR []     []     []     []       RIGHT []     []     []     [x]       LEFT []     []     []     [x]         Limbs:   []    RUE     []    LUE     [x]    RLE    [x]    LLE   Compression: After skin care, a multi layered compression bandage was applied bilaterally from foot to knee using 2 short stretch bandages, 5\" surgi  and lymphesoft foam. Pt instructed to remove if any medical complications ie SOB. She has existing Helyn Mayi she can wear if she has any problems with bandages over the weekend. Treatment/Session Assessment:    · Response to Treatment:  Pt tolerated treatment session well with no medical complications. Skin was intact and addressed with Eucerin lotion. Pt is making good progress towards goals and she is pleased. · Compliance with Program/Exercises: compliant to date   · Recommendations/Intent for next treatment session: \"Next visit will focus on complete decongestive therapy to decrease bilateral LE lymphedema \".   Total Treatment Duration:60min  OT Patient Time In/Time Out  Time In: 1100  Time Out: [address] [facility] [address]

## 2020-10-19 ENCOUNTER — HOSPITAL ENCOUNTER (OUTPATIENT)
Dept: PHYSICAL THERAPY | Age: 70
Discharge: HOME | End: 2020-10-19
Payer: MEDICARE

## 2020-10-19 PROCEDURE — 97140 MANUAL THERAPY 1/> REGIONS: CPT

## 2020-10-19 NOTE — PROGRESS NOTES
Nathaniel Tyson  : 1950  Primary: [employer]  Secondary: [facility]  [address], [address]  [phone]   [phone]           OUTPATIENT OCCUPATIONAL THERAPY: Daily Note 10/19/2020    ICD-10: Treatment Diagnosis: I89.0 lymphedema not elsewhere specified, 189.026 lymphedema with diabetes, I10.0 hypertension   Precautions/Allergies:   Allegra allergy [fexofenadine]; Fexofenadine-pseudoephedrine; Ace inhibitors; and Zyrtec [cetirizine]   Fall Risk Score:    Ambulatory/Rehab Services H2 Model Falls Risk Assessment    Risk Factors:       No Risk Factors Identified Ability to Rise from Chair:       (0)  Ability to rise in a single movement    Falls Prevention Plan:       No modifications necessary0   Total: (5 or greater = High Risk): 0     [facility] [place]. All Rights Reserved. [facility] Patent #[id number]. Federal Law prohibits the replication, distribution or use without written permission from [facility] [employer]     MD Orders: eval and treat MEDICAL/REFERRING DIAGNOSIS:   Lymphedema, not elsewhere classified [I89.0]   DATE OF ONSET: chronic , >38 years  REFERRING PHYSICIAN: [facility]: to be determined      INITIAL ASSESSMENT:  Ms. Azra Gonzales was referred to OT for the evaluation and treatment of bilateral LE lymphedema chronic in nature. She states swelling began >38 years ago when she was pregnant with her first child. Her family has a history of LE swelling: grandmother/mother. She has never had therapy for lymphedema but has worn compression knee highs for many years (unsure of compression level but will bring with her to next appointment). Ms. Azra Gonzales recently had bilateral mastectomy and that accompanied with COVI-19 quarantine lead to greater inactivity and weight gain which increased LE swelling.   Swelling does not improve with existing compression knee highs or when she elevates her legs. She presents with Stage 3 lymphedema which is nonpitting, does not improve with elevation and is accompanied by thickening of the skin/hyperkeratosis. Ms. Isabel Noriega would benefit from skilled OT for complete decongestive therapy to decrease bilateral LE lymphedema before transitioning into appropriate compression garment to maintain limb volume. PLAN OF CARE:   PROBLEM LIST:  1. Decreased Activity Tolerance  2. Decreased Flexibility/Joint Mobility  3. Edema/Girth INTERVENTIONS PLANNED  1. Skin care  2. Compression bandaging  3. Fitting for compression garment(s)  4. Manual therapy/Manual lymph drainage  5. Therapeutic exercise/Therapeutic activities  6. Patient Education  7. Compression pump trial prn  8.  kinesiotaping    TREATMENT PLAN:  Effective Dates: 10/5/20 to 1/3/2021 Frequency/Duration: 2x/week up to 90 days  2 xweek x90 days  and upon reassessment. Will adjust frequency and duration as progress indicates. GOALS: (Goals have been discussed and agreed upon with patient.)  Short-Term Functional Goals: Time Frame: 45 days  1. The patient/caregiver will demonstrate understanding of lymphedema precautions. 2. Patient will be independent with skin care regimen to decrease risk of cellulitis. 3. The patient/caregiver will be independent with self-manual lymph drainage techniques and show decrease in limb volume. 5. Patient will be independent in lymphatic exercises. Discharge Goals: Time Frame: 90 days  1. Patient's bilateral lower extremity circumferential measurements will decrease on volumetric graph by 10-12cm to maximize functional use in ADL's.    2. The patient/caregiver will be independent with home management of lymphedema. 3. Patient/caregiver will be independent donning and doffing bilateral lower extremity compression garment.     Rehabilitation Potential For Stated Goals:good              The information in this section was collected on 10/19/2020   (except where otherwise noted). OCCUPATIONAL PROFILE & HISTORY:   History of Present Injury/Illness (Reason for Referral):  Ms. Fabiana Osuna was referred to OT for the evaluation and treatment of bilateral LE lymphedema chronic in nature. She states swelling began >38 years ago when she was pregnant with her first child. Her family has a history of LE swelling: grandmother/mother. She has never had therapy for lymphedema but has worn compression knee highs for many years (unsure of compression level but will bring with her to next appointment). Ms. Fabiana Osuna recently had bilateral mastectomy and that accompanied with COVI-19 quarantine lead to greater inactivity and weight gain which worsened LE swelling. Swelling does not improve with existing compression knee highs or when she elevates her legs. She presents with Stage 3 lymphedema which is nonpitting, does not improve with elevation and is accompanied by thickening of the skin/hyperkeratosis. Ms. Fabiana Osuna would benefit from skilled OT for complete decongestive therapy to decrease bilateral LE lymphedema before transitioning into appropriate compression garment to maintain limb volume. Past Medical History/Comorbidities:   Ms. Fabiana Osuna  has a past medical history of Arthritis, Breast cancer ([address] Chronically dry eyes, bilateral, Colon polyps, GERD (gastroesophageal reflux disease), Gout, Hiatal hernia, Hypertension, Liver disease, Prediabetes, and Snores. Ms. Fabiana Osuna  has a past surgical history that includes hx hysterectomy (2003); hx cataract removal (Bilateral); hx bladder suspension; hx dilation and curettage; hx colonoscopy (2006); hx mastectomy (Bilateral, 8/20/2020); and hx breast reconstruction (Bilateral, 8/20/2020).   Social History/Living Environment:    pt lives in her own home with her spouse - she has supportive adult aged children/daughter is a nurse   Prior Level of Function/Work/Activity:  Retired - lifestyle has become more sedentary after breast surgery and with COVID-19 quarantine   Dominant Side:         RIGHT  Previous Treatment Approaches:          No therapy to date for lymphedema but has worn compression knee highs for many years   Addendum: 10/9/20: At time of eval pt said she had never had therapy in the past but after thinking about it she said she remembered she did have therapy in [facility] several years ago for a short time and remembers MLD and compression bandaging - she also remembered and found Savseda Crofton she had in the past that are in good shape  Current Medications:    Current Outpatient Medications:     anastrozole (Arimidex) 1 mg tablet, Take 1 mg by mouth daily. , Disp: 30 Tab, Rfl: 5    amLODIPine (NORVASC) 10 mg tablet, , Disp: , Rfl:     atorvastatin (LIPITOR) 40 mg tablet, , Disp: , Rfl:     carvediloL (COREG) 12.5 mg tablet, , Disp: , Rfl:     clopidogreL (PLAVIX) 75 mg tab, , Disp: , Rfl:     escitalopram oxalate (LEXAPRO) 20 mg tablet, , Disp: , Rfl:     HYDROcodone-acetaminophen (NORCO) 7.5-325 mg per tablet, TK 1-2 TS PO Q 4-6 H PRF PAIN, Disp: , Rfl:     losartan (COZAAR) 100 mg tablet, , Disp: , Rfl:     meloxicam (MOBIC) 7.5 mg tablet, , Disp: , Rfl:     furosemide (Lasix) 20 mg tablet, Take 20 mg by mouth daily. , Disp: , Rfl:     NIFEdipine ER (PROCARDIA XL) 90 mg ER tablet, Take 90 mg by mouth daily. , Disp: , Rfl:     metoprolol succinate (TOPROL-XL) 50 mg XL tablet, Take 50 mg by mouth daily. , Disp: , Rfl:     omeprazole (PRILOSEC) 40 mg capsule, Take 40 mg by mouth daily. , Disp: , Rfl:     pravastatin (PravachoL) 40 mg tablet, Take 40 mg by mouth nightly., Disp: , Rfl:     diclofenac (VOLTAREN) 1 % gel, Apply 4 g to affected area four (4) times daily as needed for Pain., Disp: , Rfl:     cholecalciferol (Vitamin D3) 25 mcg (1,000 unit) cap, Take 1,000 Units by mouth daily. , Disp: , Rfl:     polysorbate 80/glycerin (REFRESH DRY EYE THERAPY OP), Apply  to eye daily as needed. , Disp: , Rfl:     aspirin (ASPIRIN) 325 mg tablet, Take 325 mg by mouth daily. , Disp: , Rfl:             Date Last Reviewed:  10/19/2020   Complexity Level : Expanded review of therapy/medical records (1-2):  MODERATE COMPLEXITY   ASSESSMENT OF OCCUPATIONAL PERFORMANCE:   Palpation:          Bilateral LE lymphedema with swelling from foot to knee: stage 3 : non pitting - legs are very dense and \"full\"  ROM:          WFL but with stiffness from OA   Strength:          WFL  Special Tests:          Stemmer sign positive bilaterally   Skin Integrity:          Skin is intact but with hyperkeratosis, dry/scaly skin feet (pt has difficulty reaching), skin is taut due to swelling   Sensation: diabetic polyneuroapthy    Functional Mobility:  Independent but with decreased tolerance for task  Activities of Daily Living: modified independence with extra time and adaptive equipment   Edema/Girth:  Stage 3 non pitting   PRETREATMENT AFFECTED LIMB(s): right and left LE      Date:  10-5-20 10-16-20        Right / Left           Groin   []      []           8 inches   []      []           4 inches   []      []         PoplitealSpace   [x]      [x] 50/49.5 49.5/49         8 inches   [x]      [x] 52/53 49/52         4 inches   [x]      [x] 40/52 36/48         Ankle   [x]      [x] 33.5/34 31.5/33         Instep   [x]      [x] 23/23 22/22       Measurements are taken in centimeters:  2.54 cm = 1 inch  Total: 198.5 188.0        211.5 204.0             Physical Skills Involved:  1. Activity Tolerance  2. Edema  3. Skin Integrity Cognitive Skills Affected (resulting in the inability to perform in a timely and safe manner):  1. Psychosocial Skills Affected:  1. Habits/Routines   Number of elements that affect the Plan of Care: 3-5:  MODERATE COMPLEXITY   CLINICAL DECISION MAKING:   Outcome Measure: Tool Used: Tool Used: Lymphedema Life Impact Scale   Score:  Initial: 48 Most Recent: X (Date: -- )   Interpretation of Score:  The Lymphedema Life Impact Scale (LLIS) is a validated instrument that measures the physical, functional, and psychosocial concerns pertinent to patients with extremity lymphedema. The Scale's questionnaire is administered to patients to gauge impairments, activity limitations, and participation restrictions resulting from their lymphedema. Score 0 1-13 14-26 27-40 41-54 55-67 68   Modifier CH CI CJ CK CL CM CN     ? Other PT/OT Primary Functional Limitations:     - CURRENT STATUS: CL - 60%-79% impaired, limited or restricted    - GOAL STATUS: CK - 40%-59% impaired, limited or restricted    - D/C STATUS:  ---------------To be determined---------------       Medical Necessity:   · Skilled intervention continues to be required due to uncontrolled swelling increasing risk of cellulitis and hindering ADL's. Reason for Services/Other Comments:  · Patient continues to require skilled intervention due to inability to self manage lymphedema at this time. Clinical Decision-Making Assessment:     Use of outcome tool(s) and clinical judgement create a POC that gives a: Questionable prediction of patient's progress: MODERATE COMPLEXITY   TREATMENT:   (In addition to Assessment/Re-Assessment sessions the following treatments were rendered)    Pre-treatment Symptoms/Complaints:  Overall total of circumferential measurements decreaed by 10.5cm in the RLE and 7.5cm in the LLE. Pt states her \"legs feel so much better\". No new complaints. Pain: Initial:   Pain Intensity 1: 2  Post Session:  3   Occupational Therapy Treatments:    OT eval(  ) OT eval was completed. Pt received information on lymphedema and risk reduction/self management practices as outlined by the National Lymphedema Network. Therapeutic Exercise ( minutes):     HEP:  As above; handouts given to patient for all exercises.   Manual Therapy: (60 min): Pt received MLD in conjunction with trial of compression pump, skin care and compression bandaging to BLE's Manual Lymph Drainage:          Lymph Nodes:    Cervical Supraclavicular Axillary Abdominal Inguinal Popliteal Antecubital   RIGHT []     [x]     [x]     [x]     [x]     [x]     []       LEFT []     [x]     [x]     [x]     [x]     [x]     []          Anastamoses:   Axillo-axillary Inguino-inguinal Axillo-inguinal Inguino-axillary   ANTERIOR []     []     []     [x]       POSTERIOR []     []     []     []       RIGHT []     []     []     [x]       LEFT []     []     []     [x]         Limbs:   []    RUE     []    LUE     [x]    RLE    [x]    LLE   Compression: After skin care, a multi layered compression bandage was applied bilaterally from foot to knee using 2 short stretch bandages, 5\" surgi  and lymphesoft foam. Pt instructed to remove if any medical complications ie SOB. She has existing Serge Sylvain she can wear if she has any problems with bandages over the weekend. Treatment/Session Assessment:    · Response to Treatment:  Pt tolerated treatment session well with no medical complications. Skin was intact and addressed with Eucerin lotion. Pt is making good progress towards goals and she is pleased. · Compliance with Program/Exercises: compliant to date   · Recommendations/Intent for next treatment session: \"Next visit will focus on complete decongestive therapy to decrease bilateral LE lymphedema \".   Total Treatment Duration:60min  OT Patient Time In/Time Out  Time In: 1100  Time Out: [address],

## 2020-10-22 ENCOUNTER — HOSPITAL ENCOUNTER (OUTPATIENT)
Dept: PHYSICAL THERAPY | Age: 70
Discharge: HOME | End: 2020-10-22
Payer: MEDICARE

## 2020-10-22 PROCEDURE — 97140 MANUAL THERAPY 1/> REGIONS: CPT

## 2020-10-22 NOTE — PROGRESS NOTES
Moris Veras  : 1950  Primary: [employer]  Secondary: [facility] 200 Therapy  [address], [address]  [phone]   [phone]           OUTPATIENT OCCUPATIONAL THERAPY: Daily Note 10/22/2020    ICD-10: Treatment Diagnosis: I89.0 lymphedema not elsewhere specified, 189.026 lymphedema with diabetes, I10.0 hypertension   Precautions/Allergies:   Allegra allergy [fexofenadine]; Fexofenadine-pseudoephedrine; Ace inhibitors; and Zyrtec [cetirizine]   Fall Risk Score:    Ambulatory/Rehab Services H2 Model Falls Risk Assessment    Risk Factors:       No Risk Factors Identified Ability to Rise from Chair:       (0)  Ability to rise in a single movement    Falls Prevention Plan:       No modifications necessary0   Total: (5 or greater = High Risk): 0     [facility] [employer]. All Rights Reserved. [facility] Patent #[id number]. Federal Law prohibits the replication, distribution or use without written permission from [facility] [employer].ee     MD Orders: eval and treat MEDICAL/REFERRING DIAGNOSIS:   Lymphedema, not elsewhere classified [I89.0]   DATE OF ONSET: chronic , >38 years  REFERRING PHYSICIAN: Margot [address] [place] [address],[address]: to be determined      INITIAL ASSESSMENT:  Ms. Bassem Waters was referred to OT for the evaluation and treatment of bilateral LE lymphedema chronic in nature. She states swelling began >38 years ago when she was pregnant with her first child. Her family has a history of LE swelling: grandmother/mother. She has never had therapy for lymphedema but has worn compression knee highs for many years (unsure of compression level but will bring with her to next appointment). Ms. Bassem Waters recently had bilateral mastectomy and that accompanied with COVI-19 quarantine lead to greater inactivity and weight gain which increased LE swelling.   Swelling does not improve with existing compression knee highs or when she elevates her legs. She presents with Stage 3 lymphedema which is nonpitting, does not improve with elevation and is accompanied by thickening of the skin/hyperkeratosis. Ms. Humberto Buitrago would benefit from skilled OT for complete decongestive therapy to decrease bilateral LE lymphedema before transitioning into appropriate compression garment to maintain limb volume. PLAN OF CARE:   PROBLEM LIST:  1. Decreased Activity Tolerance  2. Decreased Flexibility/Joint Mobility  3. Edema/Girth INTERVENTIONS PLANNED  1. Skin care  2. Compression bandaging  3. Fitting for compression garment(s)  4. Manual therapy/Manual lymph drainage  5. Therapeutic exercise/Therapeutic activities  6. Patient Education  7. Compression pump trial prn  8.  kinesiotaping    TREATMENT PLAN:  Effective Dates: 10/5/20 to 1/3/2021 Frequency/Duration: 2x/week up to 90 days  2 xweek x90 days  and upon reassessment. Will adjust frequency and duration as progress indicates. GOALS: (Goals have been discussed and agreed upon with patient.)  Short-Term Functional Goals: Time Frame: 45 days  1. The patient/caregiver will demonstrate understanding of lymphedema precautions. 2. Patient will be independent with skin care regimen to decrease risk of cellulitis. 3. The patient/caregiver will be independent with self-manual lymph drainage techniques and show decrease in limb volume. 5. Patient will be independent in lymphatic exercises. Discharge Goals: Time Frame: 90 days  1. Patient's bilateral lower extremity circumferential measurements will decrease on volumetric graph by 10-12cm to maximize functional use in ADL's.    2. The patient/caregiver will be independent with home management of lymphedema. 3. Patient/caregiver will be independent donning and doffing bilateral lower extremity compression garment.     Rehabilitation Potential For Stated Goals:good              The information in this section was collected on 10/22/2020   (except where otherwise noted). OCCUPATIONAL PROFILE & HISTORY:   History of Present Injury/Illness (Reason for Referral):  Ms. Jose M Lyn was referred to OT for the evaluation and treatment of bilateral LE lymphedema chronic in nature. She states swelling began >38 years ago when she was pregnant with her first child. Her family has a history of LE swelling: grandmother/mother. She has never had therapy for lymphedema but has worn compression knee highs for many years (unsure of compression level but will bring with her to next appointment). Ms. Jose M Lyn recently had bilateral mastectomy and that accompanied with COVI-19 quarantine lead to greater inactivity and weight gain which worsened LE swelling. Swelling does not improve with existing compression knee highs or when she elevates her legs. She presents with Stage 3 lymphedema which is nonpitting, does not improve with elevation and is accompanied by thickening of the skin/hyperkeratosis. Ms. Jose M Lyn would benefit from skilled OT for complete decongestive therapy to decrease bilateral LE lymphedema before transitioning into appropriate compression garment to maintain limb volume. Past Medical History/Comorbidities:   Ms. Jose M Lyn  has a past medical history of Arthritis, Breast cancer ([facility] [address] Chronically dry eyes, bilateral, Colon polyps, GERD (gastroesophageal reflux disease), Gout, Hiatal hernia, Hypertension, Liver disease, Prediabetes, and Snores. Ms. Jose M Lyn  has a past surgical history that includes hx hysterectomy (2003); hx cataract removal (Bilateral); hx bladder suspension; hx dilation and curettage; hx colonoscopy (2006); hx mastectomy (Bilateral, 8/20/2020); and hx breast reconstruction (Bilateral, 8/20/2020).   Social History/Living Environment:    pt lives in her own home with her spouse - she has supportive adult aged children/daughter is a nurse   Prior Level of Function/Work/Activity:  Retired - lifestyle has become more sedentary after breast surgery and with COVID-19 quarantine   Dominant Side:         RIGHT  Previous Treatment Approaches:          No therapy to date for lymphedema but has worn compression knee highs for many years   Addendum: 10/9/20: At time of eval pt said she had never had therapy in the past but after thinking about it she said she remembered she did have therapy in [place] several years ago for a short time and remembers MLD and compression bandaging - she also remembered and found Melcroft Sas she had in the past that are in good shape  Current Medications:    Current Outpatient Medications:     anastrozole (Arimidex) 1 mg tablet, Take 1 mg by mouth daily. , Disp: 30 Tab, Rfl: 5    amLODIPine (NORVASC) 10 mg tablet, , Disp: , Rfl:     atorvastatin (LIPITOR) 40 mg tablet, , Disp: , Rfl:     carvediloL (COREG) 12.5 mg tablet, , Disp: , Rfl:     clopidogreL (PLAVIX) 75 mg tab, , Disp: , Rfl:     escitalopram oxalate (LEXAPRO) 20 mg tablet, , Disp: , Rfl:     HYDROcodone-acetaminophen (NORCO) 7.5-325 mg per tablet, TK 1-2 TS PO Q 4-6 H PRF PAIN, Disp: , Rfl:     losartan (COZAAR) 100 mg tablet, , Disp: , Rfl:     meloxicam (MOBIC) 7.5 mg tablet, , Disp: , Rfl:     furosemide (Lasix) 20 mg tablet, Take 20 mg by mouth daily. , Disp: , Rfl:     NIFEdipine ER (PROCARDIA XL) 90 mg ER tablet, Take 90 mg by mouth daily. , Disp: , Rfl:     metoprolol succinate (TOPROL-XL) 50 mg XL tablet, Take 50 mg by mouth daily. , Disp: , Rfl:     omeprazole (PRILOSEC) 40 mg capsule, Take 40 mg by mouth daily. , Disp: , Rfl:     pravastatin (PravachoL) 40 mg tablet, Take 40 mg by mouth nightly., Disp: , Rfl:     diclofenac (VOLTAREN) 1 % gel, Apply 4 g to affected area four (4) times daily as needed for Pain., Disp: , Rfl:     cholecalciferol (Vitamin D3) 25 mcg (1,000 unit) cap, Take 1,000 Units by mouth daily. , Disp: , Rfl:     polysorbate 80/glycerin (REFRESH DRY EYE THERAPY OP), Apply  to eye daily as needed. , Disp: , Rfl:     aspirin (ASPIRIN) 325 mg tablet, Take 325 mg by mouth daily. , Disp: , Rfl:             Date Last Reviewed:  10/22/2020   Complexity Level : Expanded review of therapy/medical records (1-2):  MODERATE COMPLEXITY   ASSESSMENT OF OCCUPATIONAL PERFORMANCE:   Palpation:          Bilateral LE lymphedema with swelling from foot to knee: stage 3 : non pitting - legs are very dense and \"full\"  ROM:          WFL but with stiffness from OA   Strength:          WFL  Special Tests:          Stemmer sign positive bilaterally   Skin Integrity:          Skin is intact but with hyperkeratosis, dry/scaly skin feet (pt has difficulty reaching), skin is taut due to swelling   Sensation: diabetic polyneuroapthy    Functional Mobility:  Independent but with decreased tolerance for task  Activities of Daily Living: modified independence with extra time and adaptive equipment   Edema/Girth:  Stage 3 non pitting   PRETREATMENT AFFECTED LIMB(s): right and left LE      Date:  10-5-20 10-16-20 10-22-20       Right / Left           Groin   []      []           8 inches   []      []           4 inches   []      []         PoplitealSpace   [x]      [x] 50/49.5 49.5/49 50/49cm        8 inches   [x]      [x] 52/53 49/52 48/51cm        4 inches   [x]      [x] 40/52 36/48 36/38cm        Ankle   [x]      [x] 33.5/34 31.5/33 30/32cm        Instep   [x]      [x] 23/23 22/22 22/22cm      Measurements are taken in centimeters:  2.54 cm = 1 inch  Total: 198.5 188.0 186cm       211.5 204.0 192cm            Physical Skills Involved:  1. Activity Tolerance  2. Edema  3. Skin Integrity Cognitive Skills Affected (resulting in the inability to perform in a timely and safe manner):  1. Psychosocial Skills Affected:  1. Habits/Routines   Number of elements that affect the Plan of Care: 3-5:  MODERATE COMPLEXITY   CLINICAL DECISION MAKING:   Outcome Measure: Tool Used:  Tool Used: Lymphedema Life Impact Scale   Score:  Initial: 48 Most Recent: X (Date: -- )   Interpretation of Score: The Lymphedema Life Impact Scale (LLIS) is a validated instrument that measures the physical, functional, and psychosocial concerns pertinent to patients with extremity lymphedema. The Scale's questionnaire is administered to patients to gauge impairments, activity limitations, and participation restrictions resulting from their lymphedema. Score 0 1-13 14-26 27-40 41-54 55-67 68   Modifier CH CI CJ CK CL CM CN     ? Other PT/OT Primary Functional Limitations:     - CURRENT STATUS: CL - 60%-79% impaired, limited or restricted    - GOAL STATUS: CK - 40%-59% impaired, limited or restricted    - D/C STATUS:  ---------------To be determined---------------       Medical Necessity:   · Skilled intervention continues to be required due to uncontrolled swelling increasing risk of cellulitis and hindering ADL's. Reason for Services/Other Comments:  · Patient continues to require skilled intervention due to inability to self manage lymphedema at this time. Clinical Decision-Making Assessment:     Use of outcome tool(s) and clinical judgement create a POC that gives a: Questionable prediction of patient's progress: MODERATE COMPLEXITY   TREATMENT:   (In addition to Assessment/Re-Assessment sessions the following treatments were rendered)    Pre-treatment Symptoms/Complaints:  Overall total of circumferential measurements decreaed by 12.5cm in the RLE and 17.5cm in the LLE. Pt states her \"legs feel so much better\". No new complaints. Pain: Initial:   Pain Intensity 1: 2  Post Session:  3   Occupational Therapy Treatments:    OT eval(  ) OT eval was completed. Pt received information on lymphedema and risk reduction/self management practices as outlined by the National Lymphedema Network. Therapeutic Exercise ( minutes):     HEP:  As above; handouts given to patient for all exercises.   Manual Therapy: (60 min): Pt received MLD in conjunction with trial of compression pump, skin care and compression  to BLE's. Prior to bandaging BLE's were measured from the knee to foot and since therapy began she has lost 12.5cm in RLE limb size and 17.5cm in LLE limb size. Manual Lymph Drainage:          Lymph Nodes:    Cervical Supraclavicular Axillary Abdominal Inguinal Popliteal Antecubital   RIGHT []     [x]     [x]     [x]     [x]     [x]     []       LEFT []     [x]     [x]     [x]     [x]     [x]     []          Anastamoses:   Axillo-axillary Inguino-inguinal Axillo-inguinal Inguino-axillary   ANTERIOR []     []     []     [x]       POSTERIOR []     []     []     []       RIGHT []     []     []     [x]       LEFT []     []     []     [x]         Limbs:   []    RUE     []    LUE     [x]    RLE    [x]    LLE   Compression: Due to limb size reduction existing [employer] were applied for compression. She will wear until her next appointment and at that time effectiveness of Vertell Stager wraps in maintaining limb size will be assessed. Additional hybrid liners (black, medium, wide) were ordered through [employer]. .     Treatment/Session Assessment:    · Response to Treatment:  Pt tolerated treatment session well with no medical complications. Skin was intact and addressed with Eucerin lotion. Pt is responding to MLD and multi layer bandaging as evidenced by 12.5cm loss in RLE limb size and 17.5cm in LLE limb size since therapy began. See compression section above. · Compliance with Program/Exercises: compliant to date   · Recommendations/Intent for next treatment session: \"Next visit will focus on complete decongestive therapy to decrease bilateral LE lymphedema \".   Total Treatment Duration:60min  OT Patient Time In/Time Out  Time In: 1000  Time Out: One Osmel Duran OT

## 2020-10-26 ENCOUNTER — HOSPITAL ENCOUNTER (OUTPATIENT)
Dept: PHYSICAL THERAPY | Age: 70
Discharge: HOME | End: 2020-10-26
Payer: MEDICARE

## 2020-10-26 PROCEDURE — 97140 MANUAL THERAPY 1/> REGIONS: CPT

## 2020-10-26 NOTE — PROGRESS NOTES
Sisto Cranker  : 1950  Primary: [employer]  Secondary: [facility]  [address], [address]  [phone]   [phone]           OUTPATIENT OCCUPATIONAL THERAPY: Daily Note 10/26/2020    ICD-10: Treatment Diagnosis: I89.0 lymphedema not elsewhere specified, 189.026 lymphedema with diabetes, I10.0 hypertension   Precautions/Allergies:   Allegra allergy [fexofenadine]; Fexofenadine-pseudoephedrine; Ace inhibitors; and Zyrtec [cetirizine]   Fall Risk Score:    Ambulatory/Rehab Services H2 Model Falls Risk Assessment    Risk Factors:       No Risk Factors Identified Ability to Rise from Chair:       (0)  Ability to rise in a single movement    Falls Prevention Plan:       No modifications necessary0   Total: (5 or greater = High Risk): 0     [facility]. All Rights Reserved. [facility] Patent #[id number]. Federal Law prohibits the replication, distribution or use without written permission from [employer]     MD Orders: eval and treat MEDICAL/REFERRING DIAGNOSIS:   Lymphedema, not elsewhere classified [I89.0]   DATE OF ONSET: chronic , >38 years  REFERRING PHYSICIAN: Margot [address] [place] [address],[address]: to be determined      INITIAL ASSESSMENT:  Ms. Maxine Salazar was referred to OT for the evaluation and treatment of bilateral LE lymphedema chronic in nature. She states swelling began >38 years ago when she was pregnant with her first child. Her family has a history of LE swelling: grandmother/mother. She has never had therapy for lymphedema but has worn compression knee highs for many years (unsure of compression level but will bring with her to next appointment). Ms. Maxine Salazar recently had bilateral mastectomy and that accompanied with COVI-19 quarantine lead to greater inactivity and weight gain which increased LE swelling.   Swelling does not improve with existing compression knee highs or when she elevates her legs. She presents with Stage 3 lymphedema which is nonpitting, does not improve with elevation and is accompanied by thickening of the skin/hyperkeratosis. Ms. Gisele Mahan would benefit from skilled OT for complete decongestive therapy to decrease bilateral LE lymphedema before transitioning into appropriate compression garment to maintain limb volume. PLAN OF CARE:   PROBLEM LIST:  1. Decreased Activity Tolerance  2. Decreased Flexibility/Joint Mobility  3. Edema/Girth INTERVENTIONS PLANNED  1. Skin care  2. Compression bandaging  3. Fitting for compression garment(s)  4. Manual therapy/Manual lymph drainage  5. Therapeutic exercise/Therapeutic activities  6. Patient Education  7. Compression pump trial prn  8.  kinesiotaping    TREATMENT PLAN:  Effective Dates: 10/5/20 to 1/3/2021 Frequency/Duration: 2x/week up to 90 days  2 xweek x90 days  and upon reassessment. Will adjust frequency and duration as progress indicates. GOALS: (Goals have been discussed and agreed upon with patient.)  Short-Term Functional Goals: Time Frame: 45 days  1. The patient/caregiver will demonstrate understanding of lymphedema precautions. 2. Patient will be independent with skin care regimen to decrease risk of cellulitis. 3. The patient/caregiver will be independent with self-manual lymph drainage techniques and show decrease in limb volume. 5. Patient will be independent in lymphatic exercises. Discharge Goals: Time Frame: 90 days  1. Patient's bilateral lower extremity circumferential measurements will decrease on volumetric graph by 10-12cm to maximize functional use in ADL's.    2. The patient/caregiver will be independent with home management of lymphedema. 3. Patient/caregiver will be independent donning and doffing bilateral lower extremity compression garment.     Rehabilitation Potential For Stated Goals:good              The information in this section was collected on 10/26/2020   (except where otherwise noted). OCCUPATIONAL PROFILE & HISTORY:   History of Present Injury/Illness (Reason for Referral):  Ms. Humberto Buitrago was referred to OT for the evaluation and treatment of bilateral LE lymphedema chronic in nature. She states swelling began >38 years ago when she was pregnant with her first child. Her family has a history of LE swelling: grandmother/mother. She has never had therapy for lymphedema but has worn compression knee highs for many years (unsure of compression level but will bring with her to next appointment). Ms. Humberto Buitrago recently had bilateral mastectomy and that accompanied with COVI-19 quarantine lead to greater inactivity and weight gain which worsened LE swelling. Swelling does not improve with existing compression knee highs or when she elevates her legs. She presents with Stage 3 lymphedema which is nonpitting, does not improve with elevation and is accompanied by thickening of the skin/hyperkeratosis. Ms. Humberto Buitrago would benefit from skilled OT for complete decongestive therapy to decrease bilateral LE lymphedema before transitioning into appropriate compression garment to maintain limb volume. Past Medical History/Comorbidities:   Ms. Humberto Buitrago  has a past medical history of Arthritis, Breast cancer ([facility] [address] Chronically dry eyes, bilateral, Colon polyps, GERD (gastroesophageal reflux disease), Gout, Hiatal hernia, Hypertension, Liver disease, Prediabetes, and Snores. Ms. Humberto Buitrago  has a past surgical history that includes hx hysterectomy (2003); hx cataract removal (Bilateral); hx bladder suspension; hx dilation and curettage; hx colonoscopy (2006); hx mastectomy (Bilateral, 8/20/2020); and hx breast reconstruction (Bilateral, 8/20/2020).   Social History/Living Environment:    pt lives in her own home with her spouse - she has supportive adult aged children/daughter is a nurse   Prior Level of Function/Work/Activity:  Retired - lifestyle has become more sedentary after breast surgery and with COVID-19 quarantine   Dominant Side:         RIGHT  Previous Treatment Approaches:          No therapy to date for lymphedema but has worn compression knee highs for many years   Addendum: 10/9/20: At time of eval pt said she had never had therapy in the past but after thinking about it she said she remembered she did have therapy in [place] several years ago for a short time and remembers MLD and compression bandaging - she also remembered and found Vitor Fatima she had in the past that are in good shape  Current Medications:    Current Outpatient Medications:     anastrozole (Arimidex) 1 mg tablet, Take 1 mg by mouth daily. , Disp: 30 Tab, Rfl: 5    amLODIPine (NORVASC) 10 mg tablet, , Disp: , Rfl:     atorvastatin (LIPITOR) 40 mg tablet, , Disp: , Rfl:     carvediloL (COREG) 12.5 mg tablet, , Disp: , Rfl:     clopidogreL (PLAVIX) 75 mg tab, , Disp: , Rfl:     escitalopram oxalate (LEXAPRO) 20 mg tablet, , Disp: , Rfl:     HYDROcodone-acetaminophen (NORCO) 7.5-325 mg per tablet, TK 1-2 TS PO Q 4-6 H PRF PAIN, Disp: , Rfl:     losartan (COZAAR) 100 mg tablet, , Disp: , Rfl:     meloxicam (MOBIC) 7.5 mg tablet, , Disp: , Rfl:     furosemide (Lasix) 20 mg tablet, Take 20 mg by mouth daily. , Disp: , Rfl:     NIFEdipine ER (PROCARDIA XL) 90 mg ER tablet, Take 90 mg by mouth daily. , Disp: , Rfl:     metoprolol succinate (TOPROL-XL) 50 mg XL tablet, Take 50 mg by mouth daily. , Disp: , Rfl:     omeprazole (PRILOSEC) 40 mg capsule, Take 40 mg by mouth daily. , Disp: , Rfl:     pravastatin (PravachoL) 40 mg tablet, Take 40 mg by mouth nightly., Disp: , Rfl:     diclofenac (VOLTAREN) 1 % gel, Apply 4 g to affected area four (4) times daily as needed for Pain., Disp: , Rfl:     cholecalciferol (Vitamin D3) 25 mcg (1,000 unit) cap, Take 1,000 Units by mouth daily. , Disp: , Rfl:     polysorbate 80/glycerin (REFRESH DRY EYE THERAPY OP), Apply  to eye daily as needed. , Disp: , Rfl:     aspirin (ASPIRIN) 325 mg tablet, Take 325 mg by mouth daily. , Disp: , Rfl:             Date Last Reviewed:  10/26/2020   Complexity Level : Expanded review of therapy/medical records (1-2):  MODERATE COMPLEXITY   ASSESSMENT OF OCCUPATIONAL PERFORMANCE:   Palpation:          Bilateral LE lymphedema with swelling from foot to knee: stage 3 : non pitting - legs are very dense and \"full\"  ROM:          WFL but with stiffness from OA   Strength:          WFL  Special Tests:          Stemmer sign positive bilaterally   Skin Integrity:          Skin is intact but with hyperkeratosis, dry/scaly skin feet (pt has difficulty reaching), skin is taut due to swelling   Sensation: diabetic polyneuroapthy    Functional Mobility:  Independent but with decreased tolerance for task  Activities of Daily Living: modified independence with extra time and adaptive equipment   Edema/Girth:  Stage 3 non pitting   PRETREATMENT AFFECTED LIMB(s): right and left LE      Date:  10-5-20 10-16-20 10-22-20       Right / Left           Groin   []      []           8 inches   []      []           4 inches   []      []         PoplitealSpace   [x]      [x] 50/49.5 49.5/49 50/49cm        8 inches   [x]      [x] 52/53 49/52 48/51cm        4 inches   [x]      [x] 40/52 36/48 36/38cm        Ankle   [x]      [x] 33.5/34 31.5/33 30/32cm        Instep   [x]      [x] 23/23 22/22 22/22cm      Measurements are taken in centimeters:  2.54 cm = 1 inch  Total: 198.5 188.0 186cm       211.5 204.0 192cm            Physical Skills Involved:  1. Activity Tolerance  2. Edema  3. Skin Integrity Cognitive Skills Affected (resulting in the inability to perform in a timely and safe manner):  1. Psychosocial Skills Affected:  1. Habits/Routines   Number of elements that affect the Plan of Care: 3-5:  MODERATE COMPLEXITY   CLINICAL DECISION MAKING:   Outcome Measure: Tool Used:  Tool Used: Lymphedema Life Impact Scale   Score:  Initial: 48 Most Recent: X (Date: -- )   Interpretation of Score: The Lymphedema Life Impact Scale (LLIS) is a validated instrument that measures the physical, functional, and psychosocial concerns pertinent to patients with extremity lymphedema. The Scale's questionnaire is administered to patients to gauge impairments, activity limitations, and participation restrictions resulting from their lymphedema. Score 0 1-13 14-26 27-40 41-54 55-67 68   Modifier CH CI CJ CK CL CM CN     ? Other PT/OT Primary Functional Limitations:     - CURRENT STATUS: CL - 60%-79% impaired, limited or restricted    - GOAL STATUS: CK - 40%-59% impaired, limited or restricted    - D/C STATUS:  ---------------To be determined---------------       Medical Necessity:   · Skilled intervention continues to be required due to uncontrolled swelling increasing risk of cellulitis and hindering ADL's. Reason for Services/Other Comments:  · Patient continues to require skilled intervention due to inability to self manage lymphedema at this time. Clinical Decision-Making Assessment:     Use of outcome tool(s) and clinical judgement create a POC that gives a: Questionable prediction of patient's progress: MODERATE COMPLEXITY   TREATMENT:   (In addition to Assessment/Re-Assessment sessions the following treatments were rendered)    Pre-treatment Symptoms/Complaints:  LE volume did not increase while wearing [place] but pt stated the compression bandages feel like they give her more support. Pain: Initial:   Pain Intensity 1: 2  Post Session:  3   Occupational Therapy Treatments:    OT eval(  ) OT eval was completed. Pt received information on lymphedema and risk reduction/self management practices as outlined by the National Lymphedema Network. Therapeutic Exercise ( minutes):     HEP:  As above; handouts given to patient for all exercises.   Manual Therapy: (60 min): Pt received MLD in conjunction with trial of compression pump, skin care and compression  to BLE's. Since therapy began she has lost 12.5cm in RLE limb size and 17.5cm in LLE limb size. Manual Lymph Drainage:          Lymph Nodes:    Cervical Supraclavicular Axillary Abdominal Inguinal Popliteal Antecubital   RIGHT []     [x]     [x]     [x]     [x]     [x]     []       LEFT []     [x]     [x]     [x]     [x]     [x]     []          Anastamoses:   Axillo-axillary Inguino-inguinal Axillo-inguinal Inguino-axillary   ANTERIOR []     []     []     [x]       POSTERIOR []     []     []     []       RIGHT []     []     []     [x]       LEFT []     []     []     [x]         Limbs:   []    RUE     []    LUE     [x]    RLE    [x]    LLE   Compression: After skin care, a multi layered compression bandage was applied to bilateral LE's from foot to knee using 5\"biagrip, 2 short stretch bandages and lymphesoft foam. Pt feels bandages provide more support than Sherrye Jewels possibly because hybird liners are older and have lost some compression. Will bandage until new hybrid liners that were ordered last week arrive. Treatment/Session Assessment:    · Response to Treatment:  Pt tolerated treatment session well with no medical complications. Skin was intact and addressed with Eucerin lotion. Pt is responding to MLD and multi layer bandaging as evidenced by 12.5cm loss in RLE limb size and 17.5cm in LLE limb size since therapy began. She is very happy about how much \"lighter\" her legs feel making it easier for her to walk, climb stairs, transfer. · Compliance with Program/Exercises: compliant to date   · Recommendations/Intent for next treatment session: \"Next visit will focus on complete decongestive therapy to decrease bilateral LE lymphedema \".   Total Treatment Duration:60min  OT Patient Time In/Time Out  Time In: 1000  Time Out: [id number] [facility] Qian Hernandez

## 2020-11-02 ENCOUNTER — HOSPITAL ENCOUNTER (OUTPATIENT)
Dept: PHYSICAL THERAPY | Age: 70
Discharge: HOME | End: 2020-11-02
Payer: MEDICARE

## 2020-11-02 PROCEDURE — 97140 MANUAL THERAPY 1/> REGIONS: CPT

## 2020-11-02 NOTE — PROGRESS NOTES
Carey Drain  : 1950  Primary: [employer]  Secondary: [facility]  [address], [address]  [phone]   [phone]           OUTPATIENT OCCUPATIONAL THERAPY: Daily Note 2020    ICD-10: Treatment Diagnosis: I89.0 lymphedema not elsewhere specified, 189.026 lymphedema with diabetes, I10.0 hypertension   Precautions/Allergies:   Allegra allergy [fexofenadine]; Fexofenadine-pseudoephedrine; Ace inhibitors; and Zyrtec [cetirizine]   Fall Risk Score:    Ambulatory/Rehab Services H2 Model Falls Risk Assessment    Risk Factors:       No Risk Factors Identified Ability to Rise from Chair:       (0)  Ability to rise in a single movement    Falls Prevention Plan:       No modifications necessary0   Total: (5 or greater = High Risk): 0     [facility] [employer] All Rights Reserved. [facility] Patent #[id number]. Federal Law prohibits the replication, distribution or use without written permission from [facility] [employer]     MD Orders: eval and treat MEDICAL/REFERRING DIAGNOSIS:   Lymphedema, not elsewhere classified [I89.0]   DATE OF ONSET: chronic , >38 years  REFERRING PHYSICIAN: [facility]: to be determined      INITIAL ASSESSMENT:  Ms. Ileana Ha was referred to OT for the evaluation and treatment of bilateral LE lymphedema chronic in nature. She states swelling began >38 years ago when she was pregnant with her first child. Her family has a history of LE swelling: grandmother/mother. She has never had therapy for lymphedema but has worn compression knee highs for many years (unsure of compression level but will bring with her to next appointment). Ms. Ileana Ha recently had bilateral mastectomy and that accompanied with COVI-19 quarantine lead to greater inactivity and weight gain which increased LE swelling.   Swelling does not improve with existing compression knee highs or when she elevates her legs. She presents with Stage 3 lymphedema which is nonpitting, does not improve with elevation and is accompanied by thickening of the skin/hyperkeratosis. Ms. Yin Stack would benefit from skilled OT for complete decongestive therapy to decrease bilateral LE lymphedema before transitioning into appropriate compression garment to maintain limb volume. PLAN OF CARE:   PROBLEM LIST:  1. Decreased Activity Tolerance  2. Decreased Flexibility/Joint Mobility  3. Edema/Girth INTERVENTIONS PLANNED  1. Skin care  2. Compression bandaging  3. Fitting for compression garment(s)  4. Manual therapy/Manual lymph drainage  5. Therapeutic exercise/Therapeutic activities  6. Patient Education  7. Compression pump trial prn  8.  kinesiotaping    TREATMENT PLAN:  Effective Dates: 10/5/20 to 1/3/2021 Frequency/Duration: 2x/week up to 90 days  2 xweek x90 days  and upon reassessment. Will adjust frequency and duration as progress indicates. GOALS: (Goals have been discussed and agreed upon with patient.)  Short-Term Functional Goals: Time Frame: 45 days  1. The patient/caregiver will demonstrate understanding of lymphedema precautions. 2. Patient will be independent with skin care regimen to decrease risk of cellulitis. 3. The patient/caregiver will be independent with self-manual lymph drainage techniques and show decrease in limb volume. 5. Patient will be independent in lymphatic exercises. Discharge Goals: Time Frame: 90 days  1. Patient's bilateral lower extremity circumferential measurements will decrease on volumetric graph by 10-12cm to maximize functional use in ADL's.    2. The patient/caregiver will be independent with home management of lymphedema. 3. Patient/caregiver will be independent donning and doffing bilateral lower extremity compression garment.     Rehabilitation Potential For Stated Goals:good              The information in this section was collected on 11/2/2020   (except where otherwise noted). OCCUPATIONAL PROFILE & HISTORY:   History of Present Injury/Illness (Reason for Referral):  Ms. Konrad Rivas was referred to OT for the evaluation and treatment of bilateral LE lymphedema chronic in nature. She states swelling began >38 years ago when she was pregnant with her first child. Her family has a history of LE swelling: grandmother/mother. She has never had therapy for lymphedema but has worn compression knee highs for many years (unsure of compression level but will bring with her to next appointment). Ms. Konrad Rivas recently had bilateral mastectomy and that accompanied with COVI-19 quarantine lead to greater inactivity and weight gain which worsened LE swelling. Swelling does not improve with existing compression knee highs or when she elevates her legs. She presents with Stage 3 lymphedema which is nonpitting, does not improve with elevation and is accompanied by thickening of the skin/hyperkeratosis. Ms. Konrad Rivas would benefit from skilled OT for complete decongestive therapy to decrease bilateral LE lymphedema before transitioning into appropriate compression garment to maintain limb volume. Past Medical History/Comorbidities:   Ms. Konrad Rivas  has a past medical history of Arthritis, Breast cancer ([address] Chronically dry eyes, bilateral, Colon polyps, GERD (gastroesophageal reflux disease), Gout, Hiatal hernia, Hypertension, Liver disease, Prediabetes, and Snores. Ms. Konrad Rivas  has a past surgical history that includes hx hysterectomy (2003); hx cataract removal (Bilateral); hx bladder suspension; hx dilation and curettage; hx colonoscopy (2006); hx mastectomy (Bilateral, 8/20/2020); and hx breast reconstruction (Bilateral, 8/20/2020).   Social History/Living Environment:    pt lives in her own home with her spouse - she has supportive adult aged children/daughter is a nurse   Prior Level of Function/Work/Activity:  Retired - lifestyle has become more sedentary after breast surgery and with COVID-19 quarantine   Dominant Side:         RIGHT  Previous Treatment Approaches:          No therapy to date for lymphedema but has worn compression knee highs for many years   Addendum: 10/9/20: At time of eval pt said she had never had therapy in the past but after thinking about it she said she remembered she did have therapy in [facility] several years ago for a short time and remembers MLD and compression bandaging - she also remembered and found Debbora Harley she had in the past that are in good shape  Current Medications:    Current Outpatient Medications:     anastrozole (Arimidex) 1 mg tablet, Take 1 mg by mouth daily. , Disp: 30 Tab, Rfl: 5    amLODIPine (NORVASC) 10 mg tablet, , Disp: , Rfl:     atorvastatin (LIPITOR) 40 mg tablet, , Disp: , Rfl:     carvediloL (COREG) 12.5 mg tablet, , Disp: , Rfl:     clopidogreL (PLAVIX) 75 mg tab, , Disp: , Rfl:     escitalopram oxalate (LEXAPRO) 20 mg tablet, , Disp: , Rfl:     HYDROcodone-acetaminophen (NORCO) 7.5-325 mg per tablet, TK 1-2 TS PO Q 4-6 H PRF PAIN, Disp: , Rfl:     losartan (COZAAR) 100 mg tablet, , Disp: , Rfl:     meloxicam (MOBIC) 7.5 mg tablet, , Disp: , Rfl:     furosemide (Lasix) 20 mg tablet, Take 20 mg by mouth daily. , Disp: , Rfl:     NIFEdipine ER (PROCARDIA XL) 90 mg ER tablet, Take 90 mg by mouth daily. , Disp: , Rfl:     metoprolol succinate (TOPROL-XL) 50 mg XL tablet, Take 50 mg by mouth daily. , Disp: , Rfl:     omeprazole (PRILOSEC) 40 mg capsule, Take 40 mg by mouth daily. , Disp: , Rfl:     pravastatin (PravachoL) 40 mg tablet, Take 40 mg by mouth nightly., Disp: , Rfl:     diclofenac (VOLTAREN) 1 % gel, Apply 4 g to affected area four (4) times daily as needed for Pain., Disp: , Rfl:     cholecalciferol (Vitamin D3) 25 mcg (1,000 unit) cap, Take 1,000 Units by mouth daily. , Disp: , Rfl:     polysorbate 80/glycerin (REFRESH DRY EYE THERAPY OP), Apply  to eye daily as needed. , Disp: , Rfl:     aspirin (ASPIRIN) 325 mg tablet, Take 325 mg by mouth daily. , Disp: , Rfl:             Date Last Reviewed:  11/2/2020   Complexity Level : Expanded review of therapy/medical records (1-2):  MODERATE COMPLEXITY   ASSESSMENT OF OCCUPATIONAL PERFORMANCE:   Palpation:          Bilateral LE lymphedema with swelling from foot to knee: stage 3 : non pitting - legs are very dense and \"full\"  ROM:          WFL but with stiffness from OA   Strength:          WFL  Special Tests:          Stemmer sign positive bilaterally   Skin Integrity:          Skin is intact but with hyperkeratosis, dry/scaly skin feet (pt has difficulty reaching), skin is taut due to swelling   Sensation: diabetic polyneuroapthy    Functional Mobility:  Independent but with decreased tolerance for task  Activities of Daily Living: modified independence with extra time and adaptive equipment   Edema/Girth:  Stage 3 non pitting   PRETREATMENT AFFECTED LIMB(s): right and left LE      Date:  10-5-20 10-16-20 10-22-20       Right / Left           Groin   []      []           8 inches   []      []           4 inches   []      []         PoplitealSpace   [x]      [x] 50/49.5 49.5/49 50/49cm        8 inches   [x]      [x] 52/53 49/52 48/51cm        4 inches   [x]      [x] 40/52 36/48 36/38cm        Ankle   [x]      [x] 33.5/34 31.5/33 30/32cm        Instep   [x]      [x] 23/23 22/22 22/22cm      Measurements are taken in centimeters:  2.54 cm = 1 inch  Total: 198.5 188.0 186cm       211.5 204.0 192cm            Physical Skills Involved:  1. Activity Tolerance  2. Edema  3. Skin Integrity Cognitive Skills Affected (resulting in the inability to perform in a timely and safe manner):  1. Psychosocial Skills Affected:  1. Habits/Routines   Number of elements that affect the Plan of Care: 3-5:  MODERATE COMPLEXITY   CLINICAL DECISION MAKING:   Outcome Measure: Tool Used:  Tool Used: Lymphedema Life Impact Scale   Score:  Initial: 48 Most Recent: X (Date: -- )   Interpretation of Score: The Lymphedema Life Impact Scale (LLIS) is a validated instrument that measures the physical, functional, and psychosocial concerns pertinent to patients with extremity lymphedema. The Scale's questionnaire is administered to patients to gauge impairments, activity limitations, and participation restrictions resulting from their lymphedema. Score 0 1-13 14-26 27-40 41-54 55-67 68   Modifier CH CI CJ CK CL CM CN     ? Other PT/OT Primary Functional Limitations:     - CURRENT STATUS: CL - 60%-79% impaired, limited or restricted    - GOAL STATUS: CK - 40%-59% impaired, limited or restricted    - D/C STATUS:  ---------------To be determined---------------       Medical Necessity:   · Skilled intervention continues to be required due to uncontrolled swelling increasing risk of cellulitis and hindering ADL's. Reason for Services/Other Comments:  · Patient continues to require skilled intervention due to inability to self manage lymphedema at this time. Clinical Decision-Making Assessment:     Use of outcome tool(s) and clinical judgement create a POC that gives a: Questionable prediction of patient's progress: MODERATE COMPLEXITY   TREATMENT:   (In addition to Assessment/Re-Assessment sessions the following treatments were rendered)    Pre-treatment Symptoms/Complaints:  Pt's appt last Thursday had to be canceled due to power outage at clinic. She removed bandages Thursday and wore Serge Sylvain over the weekend. [employer] Hybrid socks are here and pt will begin wearing daily with Serge Sylvain. Pain: Initial:   Pain Intensity 1: 2  Post Session:  2   Occupational Therapy Treatments:    OT eval(  ) OT eval was completed. Pt received information on lymphedema and risk reduction/self management practices as outlined by the National Lymphedema Network. Therapeutic Exercise ( minutes):     HEP:  As above; handouts given to patient for all exercises.   Manual Therapy: (60 min): Pt received MLD in conjunction with trial of compression pump, skin care and compression  to BLE's. Pt educated on home program using teach back method to include daily wear of compression, self MLD, LE exercises, elevation and diet. Manual Lymph Drainage:          Lymph Nodes:    Cervical Supraclavicular Axillary Abdominal Inguinal Popliteal Antecubital   RIGHT []     [x]     [x]     [x]     [x]     [x]     []       LEFT []     [x]     [x]     [x]     [x]     [x]     []          Anastamoses:   Axillo-axillary Inguino-inguinal Axillo-inguinal Inguino-axillary   ANTERIOR []     []     []     [x]       POSTERIOR []     []     []     []       RIGHT []     []     []     [x]       LEFT []     []     []     [x]         Limbs:   []    RUE     []    LUE     [x]    RLE    [x]    LLE   Compression: Pt's new [employer] Hybrid liners arrived from [employer] and pt will begin wearing daily with Debbora Harley. Treatment/Session Assessment:    · Response to Treatment:  Pt tolerated treatment session well with no medical complications. Skin was intact and addressed with Eucerin lotion. Pt is responding to MLD and multi layer bandaging as evidenced by 12.5cm loss in RLE limb size and 17.5cm in LLE limb size since therapy began. She is ready to transition to daily wear of Debbora Harley now that new liners are here. · Compliance with Program/Exercises: compliant to date   · Recommendations/Intent for next treatment session: \"Next visit will focus on complete decongestive therapy to decrease bilateral LE lymphedema \".   Total Treatment Duration:60min  OT Patient Time In/Time Out  Time In: 1000  Time Out: [id number] [facility]

## 2020-11-03 ENCOUNTER — HOSPITAL ENCOUNTER (OUTPATIENT)
Dept: MAMMOGRAPHY | Age: 70
Discharge: HOME | End: 2020-11-03
Attending: INTERNAL MEDICINE
Payer: MEDICARE

## 2020-11-03 DIAGNOSIS — Z91.89 AT RISK FOR OSTEOPOROSIS: ICD-10-CM

## 2020-11-03 PROCEDURE — 77080 DXA BONE DENSITY AXIAL: CPT

## 2020-11-10 ENCOUNTER — HOSPITAL ENCOUNTER (OUTPATIENT)
Dept: PHYSICAL THERAPY | Age: 70
Discharge: HOME | End: 2020-11-10
Payer: MEDICARE

## 2020-11-10 PROCEDURE — 97140 MANUAL THERAPY 1/> REGIONS: CPT

## 2020-11-10 NOTE — PROGRESS NOTES
Obed Ray  : 1950  Primary: [employer] Hmo  Secondary: [address] [facility] 200 Therapy  [address], [address]  [phone]   [phone]           OUTPATIENT OCCUPATIONAL THERAPY: Daily Note and Progress Report 11/10/2020    ICD-10: Treatment Diagnosis: I89.0 lymphedema not elsewhere specified, 189.026 lymphedema with diabetes, I10.0 hypertension   Precautions/Allergies:   Allegra allergy [fexofenadine]; Fexofenadine-pseudoephedrine; Ace inhibitors; and Zyrtec [cetirizine]   Fall Risk Score:    Ambulatory/Rehab Services H2 Model Falls Risk Assessment    Risk Factors:       No Risk Factors Identified Ability to Rise from Chair:       (0)  Ability to rise in a single movement    Falls Prevention Plan:       No modifications necessary0   Total: (5 or greater = High Risk): 0     [facility] [employer]. All Rights Reserved. [facility] Patent #[id number]. Federal Law prohibits the replication, distribution or use without written permission from [facility] [employer]     MD Orders: eval and treat MEDICAL/REFERRING DIAGNOSIS:   Lymphedema, not elsewhere classified [I89.0]   DATE OF ONSET: chronic , >38 years  REFERRING PHYSICIAN: Margot [facility],[address]: to be determined      INITIAL ASSESSMENT:  Ms. Kayli Lovelace was referred to OT for the evaluation and treatment of bilateral LE lymphedema chronic in nature. She states swelling began >38 years ago when she was pregnant with her first child. Her family has a history of LE swelling: grandmother/mother. She has never had therapy for lymphedema but has worn compression knee highs for many years (unsure of compression level but will bring with her to next appointment). Ms. Kayli Lovelace recently had bilateral mastectomy and that accompanied with COVI-19 quarantine lead to greater inactivity and weight gain which increased LE swelling.   Swelling does not improve with existing compression knee highs or when she elevates her legs. She presents with Stage 3 lymphedema which is nonpitting, does not improve with elevation and is accompanied by thickening of the skin/hyperkeratosis. Ms. Amelia Smith would benefit from skilled OT for complete decongestive therapy to decrease bilateral LE lymphedema before transitioning into appropriate compression garment to maintain limb volume. PLAN OF CARE:   PROBLEM LIST:  1. Decreased Activity Tolerance  2. Decreased Flexibility/Joint Mobility  3. Edema/Girth INTERVENTIONS PLANNED  1. Skin care  2. Compression bandaging  3. Fitting for compression garment(s)  4. Manual therapy/Manual lymph drainage  5. Therapeutic exercise/Therapeutic activities  6. Patient Education  7. Compression pump trial prn  8.  kinesiotaping    TREATMENT PLAN:  Effective Dates: 10/5/20 to 1/3/2021 Frequency/Duration: 2x/week up to 90 days  2 xweek x90 days  and upon reassessment. Will adjust frequency and duration as progress indicates. GOALS: (Goals have been discussed and agreed upon with patient.)  Short-Term Functional Goals: Time Frame: 45 days  1. The patient/caregiver will demonstrate understanding of lymphedema precautions. Goal met   2. Patient will be independent with skin care regimen to decrease risk of cellulitis. Goal met  3. The patient/caregiver will be independent with self-manual lymph drainage techniques and show decrease in limb volume. Ongoing goal  5. Patient will be independent in lymphatic exercises. Goal met  Discharge Goals: Time Frame: 90 days  1. Patient's bilateral lower extremity circumferential measurements will decrease on volumetric graph by 10-12cm to maximize functional use in ADL's. Goal met  2. The patient/caregiver will be independent with home management of lymphedema. Ongoing goal  3. Patient/caregiver will be independent donning and doffing bilateral lower extremity compression garment.  Goal met    Rehabilitation Potential For Stated Goals:good              The information in this section was collected on 11/10/2020   (except where otherwise noted). OCCUPATIONAL PROFILE & HISTORY:   History of Present Injury/Illness (Reason for Referral):  Ms. Kemi Lane was referred to OT for the evaluation and treatment of bilateral LE lymphedema chronic in nature. She states swelling began >38 years ago when she was pregnant with her first child. Her family has a history of LE swelling: grandmother/mother. She has never had therapy for lymphedema but has worn compression knee highs for many years (unsure of compression level but will bring with her to next appointment). Ms. Kemi Lane recently had bilateral mastectomy and that accompanied with COVI-19 quarantine lead to greater inactivity and weight gain which worsened LE swelling. Swelling does not improve with existing compression knee highs or when she elevates her legs. She presents with Stage 3 lymphedema which is nonpitting, does not improve with elevation and is accompanied by thickening of the skin/hyperkeratosis. Ms. Kemi Lane would benefit from skilled OT for complete decongestive therapy to decrease bilateral LE lymphedema before transitioning into appropriate compression garment to maintain limb volume. Past Medical History/Comorbidities:   Ms. Kemi Lane  has a past medical history of Arthritis, Breast cancer ([facility] [address] Chronically dry eyes, bilateral, Colon polyps, GERD (gastroesophageal reflux disease), Gout, Hiatal hernia, Hypertension, Liver disease, Prediabetes, and Snores. Ms. Kemi Lane  has a past surgical history that includes hx hysterectomy (2003); hx cataract removal (Bilateral); hx bladder suspension; hx dilation and curettage; hx colonoscopy (2006); hx mastectomy (Bilateral, 8/20/2020); and hx breast reconstruction (Bilateral, 8/20/2020).   Social History/Living Environment:    pt lives in her own home with her spouse - she has supportive adult aged children/daughter is a nurse   Prior Level of Function/Work/Activity:  Retired - lifestyle has become more sedentary after breast surgery and with COVID-19 quarantine   Dominant Side:         RIGHT  Previous Treatment Approaches:          No therapy to date for lymphedema but has worn compression knee highs for many years   Addendum: 10/9/20: At time of eval pt said she had never had therapy in the past but after thinking about it she said she remembered she did have therapy in [place] several years ago for a short time and remembers MLD and compression bandaging - she also remembered and found A [employer] Jewels she had in the past that are in good shape  Current Medications:    Current Outpatient Medications:     anastrozole (Arimidex) 1 mg tablet, Take 1 mg by mouth daily. , Disp: 30 Tab, Rfl: 5    amLODIPine (NORVASC) 10 mg tablet, , Disp: , Rfl:     atorvastatin (LIPITOR) 40 mg tablet, , Disp: , Rfl:     carvediloL (COREG) 12.5 mg tablet, , Disp: , Rfl:     clopidogreL (PLAVIX) 75 mg tab, , Disp: , Rfl:     escitalopram oxalate (LEXAPRO) 20 mg tablet, , Disp: , Rfl:     HYDROcodone-acetaminophen (NORCO) 7.5-325 mg per tablet, TK 1-2 TS PO Q 4-6 H PRF PAIN, Disp: , Rfl:     losartan (COZAAR) 100 mg tablet, , Disp: , Rfl:     meloxicam (MOBIC) 7.5 mg tablet, , Disp: , Rfl:     furosemide (Lasix) 20 mg tablet, Take 20 mg by mouth daily. , Disp: , Rfl:     NIFEdipine ER (PROCARDIA XL) 90 mg ER tablet, Take 90 mg by mouth daily. , Disp: , Rfl:     metoprolol succinate (TOPROL-XL) 50 mg XL tablet, Take 50 mg by mouth daily. , Disp: , Rfl:     omeprazole (PRILOSEC) 40 mg capsule, Take 40 mg by mouth daily. , Disp: , Rfl:     pravastatin (PravachoL) 40 mg tablet, Take 40 mg by mouth nightly., Disp: , Rfl:     diclofenac (VOLTAREN) 1 % gel, Apply 4 g to affected area four (4) times daily as needed for Pain., Disp: , Rfl:     cholecalciferol (Vitamin D3) 25 mcg (1,000 unit) cap, Take 1,000 Units by mouth daily. , Disp: , Rfl:     polysorbate 80/glycerin (REFRESH DRY EYE THERAPY OP), Apply  to eye daily as needed. , Disp: , Rfl:     aspirin (ASPIRIN) 325 mg tablet, Take 325 mg by mouth daily. , Disp: , Rfl:             Date Last Reviewed:  11/10/2020   Complexity Level : Expanded review of therapy/medical records (1-2):  MODERATE COMPLEXITY   ASSESSMENT OF OCCUPATIONAL PERFORMANCE:   Palpation:          Bilateral LE lymphedema with swelling from foot to knee: stage 3 : non pitting - legs are very dense and \"full\"  ROM:          WFL but with stiffness from OA   Strength:          WFL  Special Tests:          Stemmer sign positive bilaterally   Skin Integrity:          Skin is intact but with hyperkeratosis, dry/scaly skin feet (pt has difficulty reaching), skin is taut due to swelling   Sensation: diabetic polyneuroapthy    Functional Mobility:  Independent but with decreased tolerance for task  Activities of Daily Living: modified independence with extra time and adaptive equipment   Edema/Girth:  Stage 3 non pitting   PRETREATMENT AFFECTED LIMB(s): right and left LE      Date:  10-5-20 10-16-20 10-22-20 11-10-20      Right / Left           Groin   []      []           8 inches   []      []           4 inches   []      []         PoplitealSpace   [x]      [x] 50/49.5 49.5/49 50/49cm 50/49cm       8 inches   [x]      [x] 52/53 49/52 48/51cm 47/51.5cm       4 inches   [x]      [x] 40/52 36/48 36/38cm 35/38cm       Ankle   [x]      [x] 33.5/34 31.5/33 30/32cm 31/32cm       Instep   [x]      [x] 23/23 22/22 22/22cm 22/22cm     Measurements are taken in centimeters:  2.54 cm = 1 inch  Total:right 198.5 188.0 186cm 185cm     left 211.5 204.0 192cm 192.5cm           Physical Skills Involved:  1. Activity Tolerance  2. Edema  3. Skin Integrity Cognitive Skills Affected (resulting in the inability to perform in a timely and safe manner):  1. Psychosocial Skills Affected:  1.  Habits/Routines   Number of elements that affect the Plan of Care: 3-5:  MODERATE COMPLEXITY   CLINICAL DECISION MAKING:   Outcome Measure: Tool Used: Tool Used: Lymphedema Life Impact Scale   Score:  Initial: 48 Most Recent: 27 (Date: 11-10-20 )   Interpretation of Score: The Lymphedema Life Impact Scale (LLIS) is a validated instrument that measures the physical, functional, and psychosocial concerns pertinent to patients with extremity lymphedema. The Scale's questionnaire is administered to patients to gauge impairments, activity limitations, and participation restrictions resulting from their lymphedema. Score 0 1-13 14-26 27-40 41-54 55-67 68   Modifier CH CI CJ CK CL CM CN     ? Other PT/OT Primary Functional Limitations:     - CURRENT STATUS: CK - 40%-59% impaired, limited or restricted    - GOAL STATUS: CK - 40%-59% impaired, limited or restricted    - D/C STATUS:  ---------------To be determined---------------       Medical Necessity:   · Skilled intervention continues to be required due to uncontrolled swelling increasing risk of cellulitis and hindering ADL's. Reason for Services/Other Comments:  · Patient continues to require skilled intervention due to inability to self manage lymphedema at this time. Clinical Decision-Making Assessment:     Use of outcome tool(s) and clinical judgement create a POC that gives a: Questionable prediction of patient's progress: MODERATE COMPLEXITY   TREATMENT:   (In addition to Assessment/Re-Assessment sessions the following treatments were rendered)    Pre-treatment Symptoms/Complaints: Pt states her legs \"haven't felt this good in years. \" She has started walking at the [facility] outdoor path 2x/week. Pt's cumulative circumferential volumetric graph measurements decreased by 13.5cm in the RLE and 17cm in the LLE. Swelling is beginning to stabilize. Will begin to prepare her for discharge.    Pain: Initial:   Pain Intensity 1: 2  Post Session:  2   Occupational Therapy Treatments:    IGNACIA hamilton(  ) OT eval was completed. Pt received information on lymphedema and risk reduction/self management practices as outlined by the National Lymphedema Network. Therapeutic Exercise ( minutes):     HEP:  As above; handouts given to patient for all exercises. Manual Therapy: (60 min): Pt received MLD in conjunction with trial of compression pump, skin care and application of hybid socks/Farrow Wraps. Pt educated on home program using teach back method to include daily wear of compression, self MLD, LE exercises, elevation and diet. Manual Lymph Drainage:with instruction on self MLD - pt to practice self MLD at home and return with any additional questions          Lymph Nodes:    Cervical Supraclavicular Axillary Abdominal Inguinal Popliteal Antecubital   RIGHT []     [x]     [x]     [x]     [x]     [x]     []       LEFT []     [x]     [x]     [x]     [x]     [x]     []          Anastamoses:   Axillo-axillary Inguino-inguinal Axillo-inguinal Inguino-axillary   ANTERIOR []     []     []     [x]       POSTERIOR []     []     []     []       RIGHT []     []     []     [x]       LEFT []     []     []     [x]         Limbs:   []    RUE     []    LUE     [x]    RLE    [x]    LLE   Compression: Pt        Treatment/Session Assessment:    · Response to Treatment:  Pt tolerated treatment session well with no medical complications. Skin was intact and addressed with Eucerin lotion. Pt is responding to MLD and multi layer bandaging as evidenced by 13.5cm loss in RLE limb size and 17.cm in LLE limb size since therapy began. She has transitioned to daily wear of hybrid socks and [place] and swelling is beginning to stabilize. She has met most goals but needs continued practice with self MLD. She was instructed with hand over hand assistance and visual/verbal demonstration today. Handout provided. Pt to practice at home daily. Discharge planning in progress as pt should be ready for d/c soon.    · Compliance with Program/Exercises: compliant to date   · Recommendations/Intent for next treatment session: \"Next visit will focus on complete decongestive therapy to decrease bilateral LE lymphedema \". Transition to garments/home program. Prepare for discharge.    Total Treatment Duration:60min  OT Patient Time In/Time Out  Time In: 0900  Time Out: [address] [facility],[address], OT

## 2020-11-12 ENCOUNTER — HOSPITAL ENCOUNTER (OUTPATIENT)
Dept: PHYSICAL THERAPY | Age: 70
Discharge: HOME | End: 2020-11-12
Payer: MEDICARE

## 2020-11-12 PROCEDURE — 97140 MANUAL THERAPY 1/> REGIONS: CPT

## 2020-11-12 NOTE — THERAPY DISCHARGE
Mk Sheikh  : 1950  Primary: [employer]  Secondary: [facility]  [address], [address]  [phone]   [phone]           OUTPATIENT OCCUPATIONAL THERAPY: Daily Note and Discharge 2020    ICD-10: Treatment Diagnosis: I89.0 lymphedema not elsewhere specified, 189.026 lymphedema with diabetes, I10.0 hypertension   Precautions/Allergies:   Allegra allergy [fexofenadine]; Fexofenadine-pseudoephedrine; Ace inhibitors; and Zyrtec [cetirizine]   Fall Risk Score:    Ambulatory/Rehab Services H2 Model Falls Risk Assessment    Risk Factors:       No Risk Factors Identified Ability to Rise from Chair:       (0)  Ability to rise in a single movement    Falls Prevention Plan:       No modifications necessary0   Total: (5 or greater = High Risk): 0     [facility] of [employer]. All Rights Reserved. [facility] [employer] Patent #[id number]. Federal Law prohibits the replication, distribution or use without written permission from [facility] [employer]     MD Orders: eval and treat MEDICAL/REFERRING DIAGNOSIS:   Lymphedema, not elsewhere classified [I89.0]   DATE OF ONSET: chronic , >38 years  REFERRING PHYSICIAN: Margot [address] [place] [address],[address]: to be determined    Discharge Summary: Ms. Humberto Buitrago was seen for 9 OT treatment sessions between 10-5-20 to 20. She met all OT goals. She demonstrates a reduction in cumulative circumferential measurements of 13.5cm in the RLE and 17cm in the LLE. LE pain decreased by 3 grades from 3/10 to 0/10. Her LLIS score improved by 32 points from 60-79% impaired, limited, or restricted to 20-39% impaired, limited or restricted. LE mobility and endurance have improved and she is back to walking regularly for exercise. Ms. Humberto Buitrago transitioned to [facility] with new hybrid socks and they are effectively maintaining fluid reduction.  She is independent with home program including skin/nail care, self MLD, donning/doffing compression for daily wear, LE exercises, elevation, diet, and knowledge of precautions. She is ready for discharge from OT. INITIAL ASSESSMENT:  Ms. Etienne Bird was referred to OT for the evaluation and treatment of bilateral LE lymphedema chronic in nature. She states swelling began >38 years ago when she was pregnant with her first child. Her family has a history of LE swelling: grandmother/mother. She has never had therapy for lymphedema but has worn compression knee highs for many years (unsure of compression level but will bring with her to next appointment). Ms. Etienne Bird recently had bilateral mastectomy and that accompanied with COVI-19 quarantine lead to greater inactivity and weight gain which increased LE swelling. Swelling does not improve with existing compression knee highs or when she elevates her legs. She presents with Stage 3 lymphedema which is nonpitting, does not improve with elevation and is accompanied by thickening of the skin/hyperkeratosis. Ms. Etienne Bird would benefit from skilled OT for complete decongestive therapy to decrease bilateral LE lymphedema before transitioning into appropriate compression garment to maintain limb volume. PLAN OF CARE:   PROBLEM LIST:  1. Decreased Activity Tolerance  2. Decreased Flexibility/Joint Mobility  3. Edema/Girth INTERVENTIONS PLANNED  1. Skin care  2. Compression bandaging  3. Fitting for compression garment(s)  4. Manual therapy/Manual lymph drainage  5. Therapeutic exercise/Therapeutic activities  6. Patient Education  7. Compression pump trial prn  8.  kinesiotaping    TREATMENT PLAN:  Effective Dates: 10/5/20 to 1/3/2021 Frequency/Duration: 2x/week up to 90 days discharge 11/12/20  2 xweek x90 days  and upon reassessment. Will adjust frequency and duration as progress indicates.     GOALS: (Goals have been discussed and agreed upon with patient.)  Short-Term Functional Goals: Time Frame: 45 days  1. The patient/caregiver will demonstrate understanding of lymphedema precautions. Goal met   2. Patient will be independent with skin care regimen to decrease risk of cellulitis. Goal met  3. The patient/caregiver will be independent with self-manual lymph drainage techniques and show decrease in limb volume. Goal met  5. Patient will be independent in lymphatic exercises. Goal met  Discharge Goals: Time Frame: 90 days  1. Patient's bilateral lower extremity circumferential measurements will decrease on volumetric graph by 10-12cm to maximize functional use in ADL's. Goal met  2. The patient/caregiver will be independent with home management of lymphedema. Goal met  3. Patient/caregiver will be independent donning and doffing bilateral lower extremity compression garment. Goal met    Rehabilitation Potential For Stated Goals:good              The information in this section was collected on 11/12/2020   (except where otherwise noted). OCCUPATIONAL PROFILE & HISTORY:   History of Present Injury/Illness (Reason for Referral):  Ms. Kayli Lovelace was referred to OT for the evaluation and treatment of bilateral LE lymphedema chronic in nature. She states swelling began >38 years ago when she was pregnant with her first child. Her family has a history of LE swelling: grandmother/mother. She has never had therapy for lymphedema but has worn compression knee highs for many years (unsure of compression level but will bring with her to next appointment). Ms. Kayli Lovelace recently had bilateral mastectomy and that accompanied with COVI-19 quarantine lead to greater inactivity and weight gain which worsened LE swelling. Swelling does not improve with existing compression knee highs or when she elevates her legs. She presents with Stage 3 lymphedema which is nonpitting, does not improve with elevation and is accompanied by thickening of the skin/hyperkeratosis.  Ms. Kayli Lovelace would benefit from skilled OT for complete decongestive therapy to decrease bilateral LE lymphedema before transitioning into appropriate compression garment to maintain limb volume. Past Medical History/Comorbidities:   Ms. Gold Becker  has a past medical history of Arthritis, Breast cancer ([address] Chronically dry eyes, bilateral, Colon polyps, GERD (gastroesophageal reflux disease), Gout, Hiatal hernia, Hypertension, Liver disease, Prediabetes, and Snores. Ms. Gold Becker  has a past surgical history that includes hx hysterectomy (2003); hx cataract removal (Bilateral); hx bladder suspension; hx dilation and curettage; hx colonoscopy (2006); hx mastectomy (Bilateral, 8/20/2020); and hx breast reconstruction (Bilateral, 8/20/2020). Social History/Living Environment:    pt lives in her own home with her spouse - she has supportive adult aged children/daughter is a nurse   Prior Level of Function/Work/Activity:  Retired - lifestyle has become more sedentary after breast surgery and with COVID-19 quarantine   Dominant Side:         RIGHT  Previous Treatment Approaches:          No therapy to date for lymphedema but has worn compression knee highs for many years   Addendum: 10/9/20: At time of eval pt said she had never had therapy in the past but after thinking about it she said she remembered she did have therapy in [place] several years ago for a short time and remembers MLD and compression bandaging - she also remembered and found Serge Sylvain she had in the past that are in good shape  Current Medications:    Current Outpatient Medications:     anastrozole (Arimidex) 1 mg tablet, Take 1 mg by mouth daily. , Disp: 30 Tab, Rfl: 5    amLODIPine (NORVASC) 10 mg tablet, , Disp: , Rfl:     atorvastatin (LIPITOR) 40 mg tablet, , Disp: , Rfl:     carvediloL (COREG) 12.5 mg tablet, , Disp: , Rfl:     clopidogreL (PLAVIX) 75 mg tab, , Disp: , Rfl:     escitalopram oxalate (LEXAPRO) 20 mg tablet, , Disp: , Rfl:     HYDROcodone-acetaminophen (NORCO) 7.5-325 mg per tablet, TK 1-2 TS PO Q 4-6 H PRF PAIN, Disp: , Rfl:     losartan (COZAAR) 100 mg tablet, , Disp: , Rfl:     meloxicam (MOBIC) 7.5 mg tablet, , Disp: , Rfl:     furosemide (Lasix) 20 mg tablet, Take 20 mg by mouth daily. , Disp: , Rfl:     NIFEdipine ER (PROCARDIA XL) 90 mg ER tablet, Take 90 mg by mouth daily. , Disp: , Rfl:     metoprolol succinate (TOPROL-XL) 50 mg XL tablet, Take 50 mg by mouth daily. , Disp: , Rfl:     omeprazole (PRILOSEC) 40 mg capsule, Take 40 mg by mouth daily. , Disp: , Rfl:     pravastatin (PravachoL) 40 mg tablet, Take 40 mg by mouth nightly., Disp: , Rfl:     diclofenac (VOLTAREN) 1 % gel, Apply 4 g to affected area four (4) times daily as needed for Pain., Disp: , Rfl:     cholecalciferol (Vitamin D3) 25 mcg (1,000 unit) cap, Take 1,000 Units by mouth daily. , Disp: , Rfl:     polysorbate 80/glycerin (REFRESH DRY EYE THERAPY OP), Apply  to eye daily as needed. , Disp: , Rfl:     aspirin (ASPIRIN) 325 mg tablet, Take 325 mg by mouth daily. , Disp: , Rfl:             Date Last Reviewed:  11/12/2020   Complexity Level : Expanded review of therapy/medical records (1-2):  MODERATE COMPLEXITY   ASSESSMENT OF OCCUPATIONAL PERFORMANCE:   Palpation:          Bilateral LE lymphedema with swelling from foot to knee: fluid is softer and more mobile  ROM:          WFL but with stiffness from OA   Strength:          WFL  Special Tests:          Stemmer sign positive bilaterally   Skin Integrity:          Skin is intact - pt compliant with daily skin/nail care - had appt with podiatrist yesterday   Sensation: diabetic polyneuroapthy    Functional Mobility:  Independent now with improved tolerance for task  Activities of Daily Living: modified independence with extra time and adaptive equipment   Edema/Girth:  Stage 3 non pitting   PRETREATMENT AFFECTED LIMB(s): right and left LE      Date:  10-5-20 10-16-20 10-22-20 11-10-20      Right / Left           Groin   []      []           8 inches []      []           4 inches   []      []         PoplitealSpace   [x]      [x] 50/49.5 49.5/49 50/49cm 50/49cm       8 inches   [x]      [x] 52/53 49/52 48/51cm 47/51.5cm       4 inches   [x]      [x] 40/52 36/48 36/38cm 35/38cm       Ankle   [x]      [x] 33.5/34 31.5/33 30/32cm 31/32cm       Instep   [x]      [x] 23/23 22/22 22/22cm 22/22cm     Measurements are taken in centimeters:  2.54 cm = 1 inch  Total:right 198.5 188.0 186cm 185cm     left 211.5 204.0 192cm 192.5cm           Physical Skills Involved:  1. Activity Tolerance  2. Edema  3. Skin Integrity Cognitive Skills Affected (resulting in the inability to perform in a timely and safe manner):  1. Psychosocial Skills Affected:  1. Habits/Routines   Number of elements that affect the Plan of Care: 3-5:  MODERATE COMPLEXITY   CLINICAL DECISION MAKING:   Outcome Measure: Tool Used: Tool Used: Lymphedema Life Impact Scale   Score:  Initial: 48 Most Recent: 16 (Date: 11-12-20 )   Interpretation of Score: The Lymphedema Life Impact Scale (LLIS) is a validated instrument that measures the physical, functional, and psychosocial concerns pertinent to patients with extremity lymphedema. The Scale's questionnaire is administered to patients to gauge impairments, activity limitations, and participation restrictions resulting from their lymphedema. Score 0 1-13 14-26 27-40 41-54 55-67 68   Modifier CH CI CJ CK CL CM CN     ?  Other PT/OT Primary Functional Limitations:     - CURRENT STATUS: CJ - 20%-39% impaired, limited or restricted    - GOAL STATUS: CK - 40%-59% impaired, limited or restricted    - D/C STATUS:  CJ - 20%-39% impaired, limited or restricted     ·   Clinical Decision-Making Assessment:     Use of outcome tool(s) and clinical judgement create a POC that gives a: Questionable prediction of patient's progress: MODERATE COMPLEXITY   TREATMENT:   (In addition to Assessment/Re-Assessment sessions the following treatments were rendered)    Pre-treatment Symptoms/Complaints: Swelling has reduced and stabilized and pt is independent with home program. Discharge from OT. Pain: Initial:   Pain Intensity 1: 0  Post Session:  0   Occupational Therapy Treatments:    OT eval(  ) OT eval was completed. Pt received information on lymphedema and risk reduction/self management practices as outlined by the National Lymphedema Network. Therapeutic Exercise ( minutes):     HEP:  As above; handouts given to patient for all exercises. Manual Therapy: (60 min): Pt received MLD in conjunction with trial of compression pump, skin care and application of hybid socks/Farrow Wraps. Pt educated on home program using teach back method to include daily wear of compression, self MLD, LE exercises, elevation and diet. Manual Lymph Drainage:with instruction on self MLD - she has been practicing at home and demonstrates improved technique          Lymph Nodes:    Cervical Supraclavicular Axillary Abdominal Inguinal Popliteal Antecubital   RIGHT []     [x]     [x]     [x]     [x]     [x]     []       LEFT []     [x]     [x]     [x]     [x]     [x]     []          Anastamoses:   Axillo-axillary Inguino-inguinal Axillo-inguinal Inguino-axillary   ANTERIOR []     []     []     [x]       POSTERIOR []     []     []     []       RIGHT []     []     []     [x]       LEFT []     []     []     [x]         Limbs:   []    RUE     []    LUE     [x]    RLE    [x]    LLE   Compression: Aura Ped wraps with hybrid socks for daily compression     Treatment/Session Assessment:    · Response to Treatment:  Pt tolerated treatment session well with no medical complications. Skin was intact and addressed with Eucerin lotion. Pt is responded to MLD and multi layer bandaging as evidenced by 13.5cm loss in RLE limb size and 17.cm in LLE limb size since therapy began. She transitioned to daily wear of hybrid socks and Debbora Harley and swelling has stabilized.  She has met most goals and is very pleased with her outcome. She states her \"legs feel so much better\" and she has returned to walking 3 days a week for exercise.  She is ready for d/c from OT  · Compliance with Program/Exercises: compliant to date   · Recommendations/Intent for next treatment session: Discharge from [facility] 1620  OT Patient Time In/Time Out  Time In: 0900  Time Out: [address],[address], OT

## 2020-11-16 ENCOUNTER — APPOINTMENT (OUTPATIENT)
Dept: PHYSICAL THERAPY | Age: 70
End: 2020-11-16
Payer: MEDICARE

## 2020-11-18 ENCOUNTER — APPOINTMENT (OUTPATIENT)
Dept: PHYSICAL THERAPY | Age: 70
End: 2020-11-18
Payer: MEDICARE

## 2020-11-25 ENCOUNTER — PATIENT OUTREACH (OUTPATIENT)
Dept: CASE MANAGEMENT | Age: 70
End: 2020-11-25

## 2020-11-25 ENCOUNTER — HOSPITAL ENCOUNTER (OUTPATIENT)
Dept: LAB | Age: 70
Discharge: HOME | End: 2020-11-25
Payer: MEDICARE

## 2020-11-25 DIAGNOSIS — C50.911 MALIGNANT NEOPLASM OF RIGHT BREAST IN FEMALE, ESTROGEN RECEPTOR POSITIVE, UNSPECIFIED SITE OF BREAST (HCC): ICD-10-CM

## 2020-11-25 DIAGNOSIS — Z17.0 MALIGNANT NEOPLASM OF RIGHT BREAST IN FEMALE, ESTROGEN RECEPTOR POSITIVE, UNSPECIFIED SITE OF BREAST (HCC): ICD-10-CM

## 2020-11-25 LAB
ALBUMIN SERPL-MCNC: 3.7 G/DL (ref 3.2–4.6)
ALBUMIN/GLOB SERPL: 0.8 {RATIO} (ref 1.2–3.5)
ALP SERPL-CCNC: 125 U/L (ref 50–136)
ALT SERPL-CCNC: 25 U/L (ref 12–65)
ANION GAP SERPL CALC-SCNC: 5 MMOL/L (ref 7–16)
AST SERPL-CCNC: 16 U/L (ref 15–37)
BASOPHILS # BLD: 0 K/UL (ref 0–0.2)
BASOPHILS NFR BLD: 1 % (ref 0–2)
BILIRUB SERPL-MCNC: 0.3 MG/DL (ref 0.2–1.1)
BUN SERPL-MCNC: 15 MG/DL (ref 8–23)
CALCIUM SERPL-MCNC: 9.4 MG/DL (ref 8.3–10.4)
CHLORIDE SERPL-SCNC: 108 MMOL/L (ref 98–107)
CO2 SERPL-SCNC: 27 MMOL/L (ref 21–32)
CREAT SERPL-MCNC: 1.2 MG/DL (ref 0.6–1)
DIFFERENTIAL METHOD BLD: NORMAL
EOSINOPHIL # BLD: 0.2 K/UL (ref 0–0.8)
EOSINOPHIL NFR BLD: 3 % (ref 0.5–7.8)
ERYTHROCYTE [DISTWIDTH] IN BLOOD BY AUTOMATED COUNT: 12.3 % (ref 11.9–14.6)
GLOBULIN SER CALC-MCNC: 4.7 G/DL (ref 2.3–3.5)
GLUCOSE SERPL-MCNC: 85 MG/DL (ref 65–100)
HCT VFR BLD AUTO: 45.4 % (ref 35.8–46.3)
HGB BLD-MCNC: 15.1 G/DL (ref 11.7–15.4)
IMM GRANULOCYTES # BLD AUTO: 0 K/UL (ref 0–0.5)
IMM GRANULOCYTES NFR BLD AUTO: 0 % (ref 0–5)
LYMPHOCYTES # BLD: 2 K/UL (ref 0.5–4.6)
LYMPHOCYTES NFR BLD: 34 % (ref 13–44)
MCH RBC QN AUTO: 30.8 PG (ref 26.1–32.9)
MCHC RBC AUTO-ENTMCNC: 33.3 G/DL (ref 31.4–35)
MCV RBC AUTO: 92.7 FL (ref 79.6–97.8)
MONOCYTES # BLD: 0.5 K/UL (ref 0.1–1.3)
MONOCYTES NFR BLD: 8 % (ref 4–12)
NEUTS SEG # BLD: 3.2 K/UL (ref 1.7–8.2)
NEUTS SEG NFR BLD: 54 % (ref 43–78)
NRBC # BLD: 0 K/UL (ref 0–0.2)
PLATELET # BLD AUTO: 264 K/UL (ref 150–450)
PMV BLD AUTO: 10.2 FL (ref 9.4–12.3)
POTASSIUM SERPL-SCNC: 3.7 MMOL/L (ref 3.5–5.1)
PROT SERPL-MCNC: 8.4 G/DL (ref 6.3–8.2)
RBC # BLD AUTO: 4.9 M/UL (ref 4.05–5.25)
SODIUM SERPL-SCNC: 140 MMOL/L (ref 136–145)
WBC # BLD AUTO: 5.8 K/UL (ref 4.3–11.1)

## 2020-11-25 PROCEDURE — 85025 COMPLETE CBC W/AUTO DIFF WBC: CPT

## 2020-11-25 PROCEDURE — 80053 COMPREHEN METABOLIC PANEL: CPT

## 2020-11-25 PROCEDURE — 36415 COLL VENOUS BLD VENIPUNCTURE: CPT

## 2020-11-25 NOTE — PROGRESS NOTES
11/25/2020 Saw the patient with Senthil Eaton NP. She has been taking the Arimidex. She reports some hot flashes but these are not too bothersome. Overall she is doing well. We will have her return in 6 months. I have given her treatment summary and navigation will sign off.

## 2020-12-08 ENCOUNTER — HOSPITAL ENCOUNTER (OUTPATIENT)
Dept: SURGERY | Age: 70
Discharge: HOME | End: 2020-12-08

## 2020-12-08 VITALS — BODY MASS INDEX: 44.3 KG/M2 | WEIGHT: 250 LBS | HEIGHT: 63 IN

## 2020-12-08 NOTE — PERIOP NOTES
Patient verified name and . Order for consent not found in EHR. Type 1B surgery, PAT phone assessment complete. Orders not received. Labs per surgeon: no orders received. Labs per anesthesia protocol: none. Patient answered medical/surgical history questions at their best of ability. All prior to admission medications documented in [facility]. Patient instructed to take the following medications the day of surgery according to anesthesia guidelines with a small sip of water: anastrozole, metoprolol, omeprazole and nifedipine. Hold all vitamins 7 days prior to surgery and NSAIDS 5 days prior to surgery. Patient instructed on the following:    > a negative Covid swab result is required to proceed with surgery;  appointments are made by the surgeon office and test should be collected 7 days prior to surgery. The testing center is located at the [address] [place] [address].   > 1 visitor allowed at this time. >Arrive at [facility], time of arrival to be called the day before by 1700  >NPO after midnight including gum, mints, and ice chips  >Responsible adult must drive patient to the hospital, stay during surgery, and patient will need supervision 24 hours after anesthesia  >Use antibacterial soap in shower the night before surgery and on the morning of surgery  >All piercings must be removed prior to arrival.    >Leave all valuables (money and jewelry) at home but bring insurance card and ID on  DOS. >Do not wear make-up, nail polish, lotions, cologne, perfumes, powders, or oil on skin. E-mail sent to [email or web address] with pre-op instructions per pt's request.

## 2020-12-10 RX ORDER — HEPARIN SODIUM 5000 [USP'U]/ML
5000 INJECTION, SOLUTION INTRAVENOUS; SUBCUTANEOUS ONCE
Status: CANCELLED | OUTPATIENT
Start: 2020-12-10 | End: 2020-12-10

## 2020-12-10 RX ORDER — CEFAZOLIN SODIUM/WATER 2 G/20 ML
2 SYRINGE (ML) INTRAVENOUS ONCE
Status: CANCELLED | OUTPATIENT
Start: 2020-12-15 | End: 2020-12-15

## 2020-12-10 NOTE — H&P
2020  Bobetta Seip, 69, F   [facility] Plastic Surgery   Pre-Operative History and Physical  Patient Information  :  Patient Information-  Bobetta Seip   : 1950   Scheduled Services for : Jackie Lopez   Appt Purpose: Breast Reconstruction - Exchange Tissue Expander For Permanent Implant - Bilateral, Breast Reconstruction - Revision - Bilateral Appt Notes: Surgery 12/15/20   Hospital PreOp by Phone / Leonor Tipton test  @ 3:40pm   Referral Source: Doctor   Occupation:   Insurance: [employer] Plus/Medicare Plan: [place]   Surgery date: 12/15/2020 Breast Reconstruction - Exchange Tissue Expander For Permanent Implant - Bilateral, Breast Reconstruction - Revision - Bilateral Dr Jennifer Oswald III   Medications / Allergies  :  List any allergies: Allergy   1 None   List any current medications (Please include over-the-counter medications)      Drug Dosage   1. colchicine  . 6mg   2. furosemide 20mg   3. procardia XL 90mg   4. metoprolol 50mg   5. omeprazole 40mg   6. pravastatin 40mg   7. asprin 325mg   Non-steroidal anti-inflammatory drugs (ex. Motrin, Aleve)  Do you take vitamins/minerals? Yes If yes, what kind? Vitamin D   Current Medications  Name Sig Start Date Discontinue Date   cefadroxil 500 mg capsule 1 capsule by mouth BID 2020    cyclobenzaprine 10 mg tablet 1 tablet by mouth Q6-8h 2020    Norco 7.5-325 mg Tablet Take 1-2 tablets po q 4-6 prn pain   2020    ondansetron 8 mg disintegrating tablet 1 tablet by mouth Q8h 2020    Height / Weight / BMI:  Height/Weight/BMI  Her height is 5ft 1in and weight is 250lbs. BMI is 47.23. Social / Family History:  ~MUS2 - Smoking Status  Never smoker. Social History  Patient denies alcohol use, denies using illegal drugs and denies high risk factors.    Family History  Breast Cancer Sister   Cancer Father Sister   High Blood Pressure Father  Vital Signs:  ~MUS2 - Blood Pressure  151/87   Vitals  Pulse 73 and RAPS02 95-98% Pre-Operative Diagnosis:  Diagnosis - Text  Diagnosis: right breast cancer   Planned Procedure:  Planned Procedure  bilateral revision of reconstructed breast with exchange of tissue expanders for permanent implants   History of Present Illness  Past Medical / Surgical History:  Past Medical History  Breast Cancer <Details>   Diabetes <Details>   High Blood Pressure <Details>  Review of Systems (check all current symptoms)  arthritis/joint disformity *YES* Fingers and shoulder asthma - no chest pain - no heart attack - no high blood pressure *YES* inflamation of veins - no murmur - no pacemaker - no phlebitis *YES* convulsions - no epilepsy or seizures - no fainting - no fever or chills - no heart disease - no nausea, vomiting, diarrheah when taking antibiotics - no unexpected weight loss or weight gain - no chronic cough - no hearing loss - no morning cough - no sinus disorder *YES* Sinus Problems diabetes *YES* Prediabetic excessive thirst/hunger - no frequent urination *YES* At Night thyroid disorder - no abdominal pain - no chronic diarrhea or constipation - no gastro-intestinal problems - no irregular heartbeat - no stomach absorptive disorder - no ulcers - no bladder problems *YES* currently breast feeding - no currently pregnant - no frequency/burning during urination - no kidney problems - no yeast infection - no anemia - no bleed easily - no blood clots - no blood transfusion - no arthralgia - no artificial joints - no limited motion in joints - no muscle weakness - no paralysis - no stroke - no numbness or tingling - no headache - no migraines - no bronchitis - no emphysema - no shortness of breath - no tuberculosis - no wheezing - no psychiatric treatment - no recent crisis - no history of psychiatric hospitilization - no   Past Surgeries  Hysterectomy w/Bladder Attachment 2003   bilateral catarcts  Anesthesia History  - Difficulty Waking Up. Ethnic Origin         Ethnic Origin VI. Very dark-skinned ()   Diagnosis Codes   :  Diagnosis Codes  Breast   Diagnosis - Breast  Cancer Breast - Right side   Pre-Operative Physical Exam:  HEENT  HEENT: Deferred. Cardiac Exam II  Cardiac Exam: WNL. Pulmonary Exam  Pulmonary Exam: WNL. Abdominal Exam  Abdominal Exam: Deferred. Extremity & Neuro Exam  Extremity & Neuro Exam: Deferred. Pertinent System/Surgical Site Exam  bilateral breast within normal limits for surgery   Pre-Operative Checklist / Risks & Benefits:  Pre-op Checklist  1. Patient seen preoperatively. 2. All patient questions answered. 3. Risks and benefits (including alternative treatments) reviewed. 4. Proper prescriptions given. 5. Photographs taken  6. Surgery time and date reviewed. 7. Complications both expected and unexpected discussed  8.  Patient verbalized understanding of the outcome possibilites inherent with this procedure  Pre-Op Risks and Benefits  bleeding, infection, scar, hematoma, seroma, change in nipple sensitivity-temporary or permanent, wound breakdown, hypertrophic scar, malposition of implant, implant failure, bruising, swelling, need for additional surgery, less than aesthetic result, numbness, asymmetry, partial skin flap loss, contour irregularities, under or over resection, possible need for drains, recurrence and DVT   Surgical Risks Discussed  Pre-Operative Orders:  Pre-Operative Antibiotic Order Selection  Cefazolin 2GM IV piggyback (>80kg)   Planned Procedure  bilateral immediate breast reconstruction with placement of tissue expanders and dermal matrix   Pre-Operative Orders  Labs per Anesthesia, Nothing by mouth after midnight, SCD's per Anesthesia Guidelines, Pre-Operative Antibiotic Selection, Consent for: and Heparin 5000u on call to Pre-op holding   Chapperone:  Chapperone 2  Joby Stringer, CST

## 2020-12-14 ENCOUNTER — ANESTHESIA EVENT (OUTPATIENT)
Dept: SURGERY | Age: 70
End: 2020-12-14
Payer: MEDICARE

## 2020-12-15 ENCOUNTER — ANESTHESIA (OUTPATIENT)
Dept: SURGERY | Age: 70
End: 2020-12-15
Payer: MEDICARE

## 2020-12-15 ENCOUNTER — HOSPITAL ENCOUNTER (OUTPATIENT)
Age: 70
Setting detail: OUTPATIENT SURGERY
Discharge: HOME | End: 2020-12-15
Attending: PLASTIC SURGERY | Admitting: PLASTIC SURGERY
Payer: MEDICARE

## 2020-12-15 VITALS
TEMPERATURE: 98 F | BODY MASS INDEX: 44.47 KG/M2 | SYSTOLIC BLOOD PRESSURE: 154 MMHG | HEART RATE: 62 BPM | OXYGEN SATURATION: 93 % | WEIGHT: 251 LBS | HEIGHT: 63 IN | DIASTOLIC BLOOD PRESSURE: 72 MMHG | RESPIRATION RATE: 16 BRPM

## 2020-12-15 LAB — GLUCOSE BLD STRIP.AUTO-MCNC: 103 MG/DL (ref 65–100)

## 2020-12-15 PROCEDURE — 76060000036 HC ANESTHESIA 2.5 TO 3 HR: Performed by: PLASTIC SURGERY

## 2020-12-15 PROCEDURE — 77030011266 HC ELECTRD BLD INSL COVD -A: Performed by: PLASTIC SURGERY

## 2020-12-15 PROCEDURE — 74011000250 HC RX REV CODE- 250: Performed by: NURSE ANESTHETIST, CERTIFIED REGISTERED

## 2020-12-15 PROCEDURE — 77030040922 HC BLNKT HYPOTHRM STRY -A: Performed by: NURSE ANESTHETIST, CERTIFIED REGISTERED

## 2020-12-15 PROCEDURE — 76010000171 HC OR TIME 2 TO 2.5 HR INTENSV-TIER 1: Performed by: PLASTIC SURGERY

## 2020-12-15 PROCEDURE — 2709999900 HC NON-CHARGEABLE SUPPLY: Performed by: PLASTIC SURGERY

## 2020-12-15 PROCEDURE — 77030019908 HC STETH ESOPH SIMS -A: Performed by: NURSE ANESTHETIST, CERTIFIED REGISTERED

## 2020-12-15 PROCEDURE — 77030040922 HC BLNKT HYPOTHRM STRY -A: Performed by: PLASTIC SURGERY

## 2020-12-15 PROCEDURE — 77030031139 HC SUT VCRL2 J&J -A: Performed by: PLASTIC SURGERY

## 2020-12-15 PROCEDURE — 76210000006 HC OR PH I REC 0.5 TO 1 HR: Performed by: PLASTIC SURGERY

## 2020-12-15 PROCEDURE — 74011250636 HC RX REV CODE- 250/636: Performed by: NURSE ANESTHETIST, CERTIFIED REGISTERED

## 2020-12-15 PROCEDURE — 74011000258 HC RX REV CODE- 258: Performed by: NURSE ANESTHETIST, CERTIFIED REGISTERED

## 2020-12-15 PROCEDURE — 77030011825 HC SUPP SURG PSTOP S2SG -B: Performed by: PLASTIC SURGERY

## 2020-12-15 PROCEDURE — 77030008462 HC STPLR SKN PROX J&J -A: Performed by: PLASTIC SURGERY

## 2020-12-15 PROCEDURE — 77030040361 HC SLV COMPR DVT MDII -B: Performed by: PLASTIC SURGERY

## 2020-12-15 PROCEDURE — 74011250636 HC RX REV CODE- 250/636: Performed by: STUDENT IN AN ORGANIZED HEALTH CARE EDUCATION/TRAINING PROGRAM

## 2020-12-15 PROCEDURE — 77030039425 HC BLD LARYNG TRULITE DISP TELE -A: Performed by: NURSE ANESTHETIST, CERTIFIED REGISTERED

## 2020-12-15 PROCEDURE — 77030011265 HC ELECTRD BLD HEX COVD -A: Performed by: PLASTIC SURGERY

## 2020-12-15 PROCEDURE — 77030002933 HC SUT MCRYL J&J -A: Performed by: PLASTIC SURGERY

## 2020-12-15 PROCEDURE — 74011000250 HC RX REV CODE- 250: Performed by: PLASTIC SURGERY

## 2020-12-15 PROCEDURE — 77030037088 HC TUBE ENDOTRACH ORAL NSL COVD-A: Performed by: NURSE ANESTHETIST, CERTIFIED REGISTERED

## 2020-12-15 PROCEDURE — 82962 GLUCOSE BLOOD TEST: CPT

## 2020-12-15 PROCEDURE — 76210000020 HC REC RM PH II FIRST 0.5 HR: Performed by: PLASTIC SURGERY

## 2020-12-15 PROCEDURE — C1789 PROSTHESIS, BREAST, IMP: HCPCS | Performed by: PLASTIC SURGERY

## 2020-12-15 PROCEDURE — 74011250637 HC RX REV CODE- 250/637: Performed by: STUDENT IN AN ORGANIZED HEALTH CARE EDUCATION/TRAINING PROGRAM

## 2020-12-15 PROCEDURE — 77030002936 HC SUT MERS J&J -A: Performed by: PLASTIC SURGERY

## 2020-12-15 PROCEDURE — 74011250636 HC RX REV CODE- 250/636: Performed by: PLASTIC SURGERY

## 2020-12-15 PROCEDURE — 77030002966 HC SUT PDS J&J -A: Performed by: PLASTIC SURGERY

## 2020-12-15 DEVICE — SMOOTH ROUND HIGH PROFILE GEL-FILLED BREAST IMPLANT, 650CC  SMOOTH ROUND SILICONE
Type: IMPLANTABLE DEVICE | Site: BREAST | Status: FUNCTIONAL
Brand: MENTOR MEMORYGEL BREAST IMPLANT

## 2020-12-15 DEVICE — GRAFT HUM TISS W16XL20CM THK0.4-2.4MM REGEN TISS MTRX PERF: Type: IMPLANTABLE DEVICE | Site: BREAST | Status: FUNCTIONAL

## 2020-12-15 RX ORDER — HYDROMORPHONE HYDROCHLORIDE 2 MG/ML
0.5 INJECTION, SOLUTION INTRAMUSCULAR; INTRAVENOUS; SUBCUTANEOUS
Status: DISCONTINUED | OUTPATIENT
Start: 2020-12-15 | End: 2020-12-15 | Stop reason: HOSPADM

## 2020-12-15 RX ORDER — HEPARIN SODIUM 5000 [USP'U]/ML
5000 INJECTION, SOLUTION INTRAVENOUS; SUBCUTANEOUS ONCE
Status: COMPLETED | OUTPATIENT
Start: 2020-12-15 | End: 2020-12-15

## 2020-12-15 RX ORDER — NALOXONE HYDROCHLORIDE 0.4 MG/ML
0.1 INJECTION, SOLUTION INTRAMUSCULAR; INTRAVENOUS; SUBCUTANEOUS AS NEEDED
Status: DISCONTINUED | OUTPATIENT
Start: 2020-12-15 | End: 2020-12-15 | Stop reason: HOSPADM

## 2020-12-15 RX ORDER — SODIUM CHLORIDE 0.9 % (FLUSH) 0.9 %
5-40 SYRINGE (ML) INJECTION AS NEEDED
Status: DISCONTINUED | OUTPATIENT
Start: 2020-12-15 | End: 2020-12-15 | Stop reason: HOSPADM

## 2020-12-15 RX ORDER — BACITRACIN ZINC 500 UNIT/G
OINTMENT (GRAM) TOPICAL AS NEEDED
Status: DISCONTINUED | OUTPATIENT
Start: 2020-12-15 | End: 2020-12-15 | Stop reason: HOSPADM

## 2020-12-15 RX ORDER — CEFAZOLIN SODIUM/WATER 2 G/20 ML
2 SYRINGE (ML) INTRAVENOUS ONCE
Status: COMPLETED | OUTPATIENT
Start: 2020-12-15 | End: 2020-12-15

## 2020-12-15 RX ORDER — OXYCODONE HYDROCHLORIDE 5 MG/1
5 TABLET ORAL
Status: COMPLETED | OUTPATIENT
Start: 2020-12-15 | End: 2020-12-15

## 2020-12-15 RX ORDER — SODIUM CHLORIDE, SODIUM LACTATE, POTASSIUM CHLORIDE, CALCIUM CHLORIDE 600; 310; 30; 20 MG/100ML; MG/100ML; MG/100ML; MG/100ML
100 INJECTION, SOLUTION INTRAVENOUS CONTINUOUS
Status: DISCONTINUED | OUTPATIENT
Start: 2020-12-15 | End: 2020-12-15 | Stop reason: HOSPADM

## 2020-12-15 RX ORDER — ONDANSETRON 2 MG/ML
INJECTION INTRAMUSCULAR; INTRAVENOUS AS NEEDED
Status: DISCONTINUED | OUTPATIENT
Start: 2020-12-15 | End: 2020-12-15 | Stop reason: HOSPADM

## 2020-12-15 RX ORDER — LIDOCAINE HYDROCHLORIDE 10 MG/ML
0.1 INJECTION INFILTRATION; PERINEURAL AS NEEDED
Status: DISCONTINUED | OUTPATIENT
Start: 2020-12-15 | End: 2020-12-15 | Stop reason: HOSPADM

## 2020-12-15 RX ORDER — SODIUM CHLORIDE 0.9 % (FLUSH) 0.9 %
5-40 SYRINGE (ML) INJECTION EVERY 8 HOURS
Status: DISCONTINUED | OUTPATIENT
Start: 2020-12-15 | End: 2020-12-15 | Stop reason: HOSPADM

## 2020-12-15 RX ORDER — DEXAMETHASONE SODIUM PHOSPHATE 4 MG/ML
INJECTION, SOLUTION INTRA-ARTICULAR; INTRALESIONAL; INTRAMUSCULAR; INTRAVENOUS; SOFT TISSUE AS NEEDED
Status: DISCONTINUED | OUTPATIENT
Start: 2020-12-15 | End: 2020-12-15 | Stop reason: HOSPADM

## 2020-12-15 RX ORDER — PROPOFOL 10 MG/ML
INJECTION, EMULSION INTRAVENOUS AS NEEDED
Status: DISCONTINUED | OUTPATIENT
Start: 2020-12-15 | End: 2020-12-15 | Stop reason: HOSPADM

## 2020-12-15 RX ORDER — FLUMAZENIL 0.1 MG/ML
0.2 INJECTION INTRAVENOUS
Status: DISCONTINUED | OUTPATIENT
Start: 2020-12-15 | End: 2020-12-15 | Stop reason: HOSPADM

## 2020-12-15 RX ORDER — FENTANYL CITRATE 50 UG/ML
INJECTION, SOLUTION INTRAMUSCULAR; INTRAVENOUS AS NEEDED
Status: DISCONTINUED | OUTPATIENT
Start: 2020-12-15 | End: 2020-12-15 | Stop reason: HOSPADM

## 2020-12-15 RX ORDER — HYDROMORPHONE HYDROCHLORIDE 2 MG/ML
INJECTION, SOLUTION INTRAMUSCULAR; INTRAVENOUS; SUBCUTANEOUS AS NEEDED
Status: DISCONTINUED | OUTPATIENT
Start: 2020-12-15 | End: 2020-12-15 | Stop reason: HOSPADM

## 2020-12-15 RX ORDER — NEOSTIGMINE METHYLSULFATE 1 MG/ML
INJECTION, SOLUTION INTRAVENOUS AS NEEDED
Status: DISCONTINUED | OUTPATIENT
Start: 2020-12-15 | End: 2020-12-15 | Stop reason: HOSPADM

## 2020-12-15 RX ORDER — DIPHENHYDRAMINE HYDROCHLORIDE 50 MG/ML
12.5 INJECTION, SOLUTION INTRAMUSCULAR; INTRAVENOUS
Status: DISCONTINUED | OUTPATIENT
Start: 2020-12-15 | End: 2020-12-15 | Stop reason: HOSPADM

## 2020-12-15 RX ORDER — ROCURONIUM BROMIDE 10 MG/ML
INJECTION, SOLUTION INTRAVENOUS AS NEEDED
Status: DISCONTINUED | OUTPATIENT
Start: 2020-12-15 | End: 2020-12-15 | Stop reason: HOSPADM

## 2020-12-15 RX ORDER — VANCOMYCIN HYDROCHLORIDE 1 G/20ML
INJECTION, POWDER, LYOPHILIZED, FOR SOLUTION INTRAVENOUS AS NEEDED
Status: DISCONTINUED | OUTPATIENT
Start: 2020-12-15 | End: 2020-12-15 | Stop reason: HOSPADM

## 2020-12-15 RX ORDER — GLYCOPYRROLATE 0.2 MG/ML
INJECTION INTRAMUSCULAR; INTRAVENOUS AS NEEDED
Status: DISCONTINUED | OUTPATIENT
Start: 2020-12-15 | End: 2020-12-15 | Stop reason: HOSPADM

## 2020-12-15 RX ORDER — MIDAZOLAM HYDROCHLORIDE 1 MG/ML
2 INJECTION, SOLUTION INTRAMUSCULAR; INTRAVENOUS
Status: DISCONTINUED | OUTPATIENT
Start: 2020-12-15 | End: 2020-12-15 | Stop reason: HOSPADM

## 2020-12-15 RX ORDER — LIDOCAINE HYDROCHLORIDE 20 MG/ML
INJECTION, SOLUTION EPIDURAL; INFILTRATION; INTRACAUDAL; PERINEURAL AS NEEDED
Status: DISCONTINUED | OUTPATIENT
Start: 2020-12-15 | End: 2020-12-15 | Stop reason: HOSPADM

## 2020-12-15 RX ORDER — BUPIVACAINE HYDROCHLORIDE 2.5 MG/ML
INJECTION, SOLUTION EPIDURAL; INFILTRATION; INTRACAUDAL AS NEEDED
Status: DISCONTINUED | OUTPATIENT
Start: 2020-12-15 | End: 2020-12-15 | Stop reason: HOSPADM

## 2020-12-15 RX ADMIN — DEXAMETHASONE SODIUM PHOSPHATE 5 MG: 4 INJECTION, SOLUTION INTRAMUSCULAR; INTRAVENOUS at 08:13

## 2020-12-15 RX ADMIN — VASOPRESSIN 0.1 UNITS: 20 INJECTION INTRAVENOUS at 08:55

## 2020-12-15 RX ADMIN — VASOPRESSIN 0.1 UNITS: 20 INJECTION INTRAVENOUS at 09:09

## 2020-12-15 RX ADMIN — Medication 3 MG: at 10:09

## 2020-12-15 RX ADMIN — VASOPRESSIN 0.1 UNITS: 20 INJECTION INTRAVENOUS at 09:47

## 2020-12-15 RX ADMIN — LIDOCAINE HYDROCHLORIDE 80 MG: 20 INJECTION, SOLUTION EPIDURAL; INFILTRATION; INTRACAUDAL; PERINEURAL at 07:49

## 2020-12-15 RX ADMIN — HYDROMORPHONE HYDROCHLORIDE 0.4 MG: 2 INJECTION INTRAMUSCULAR; INTRAVENOUS; SUBCUTANEOUS at 08:02

## 2020-12-15 RX ADMIN — VASOPRESSIN 0.1 UNITS: 20 INJECTION INTRAVENOUS at 08:15

## 2020-12-15 RX ADMIN — GLYCOPYRROLATE 0.1 MG: 0.2 INJECTION, SOLUTION INTRAMUSCULAR; INTRAVENOUS at 08:21

## 2020-12-15 RX ADMIN — SODIUM CHLORIDE, SODIUM LACTATE, POTASSIUM CHLORIDE, AND CALCIUM CHLORIDE: 600; 310; 30; 20 INJECTION, SOLUTION INTRAVENOUS at 09:10

## 2020-12-15 RX ADMIN — VASOPRESSIN 0.1 UNITS: 20 INJECTION INTRAVENOUS at 09:19

## 2020-12-15 RX ADMIN — ONDANSETRON 4 MG: 2 INJECTION INTRAMUSCULAR; INTRAVENOUS at 09:48

## 2020-12-15 RX ADMIN — GLYCOPYRROLATE 0.4 MG: 0.2 INJECTION, SOLUTION INTRAMUSCULAR; INTRAVENOUS at 10:09

## 2020-12-15 RX ADMIN — VASOPRESSIN 0.1 UNITS: 20 INJECTION INTRAVENOUS at 09:02

## 2020-12-15 RX ADMIN — VASOPRESSIN 0.1 UNITS: 20 INJECTION INTRAVENOUS at 09:32

## 2020-12-15 RX ADMIN — ROCURONIUM BROMIDE 50 MG: 10 INJECTION, SOLUTION INTRAVENOUS at 07:52

## 2020-12-15 RX ADMIN — VASOPRESSIN 0.2 UNITS: 20 INJECTION INTRAVENOUS at 09:39

## 2020-12-15 RX ADMIN — Medication 2 G: at 08:02

## 2020-12-15 RX ADMIN — SODIUM CHLORIDE, SODIUM LACTATE, POTASSIUM CHLORIDE, AND CALCIUM CHLORIDE 100 ML/HR: 600; 310; 30; 20 INJECTION, SOLUTION INTRAVENOUS at 06:51

## 2020-12-15 RX ADMIN — PROPOFOL 170 MG: 10 INJECTION, EMULSION INTRAVENOUS at 07:50

## 2020-12-15 RX ADMIN — GLYCOPYRROLATE 0.1 MG: 0.2 INJECTION, SOLUTION INTRAMUSCULAR; INTRAVENOUS at 08:24

## 2020-12-15 RX ADMIN — VASOPRESSIN 0.1 UNITS: 20 INJECTION INTRAVENOUS at 09:54

## 2020-12-15 RX ADMIN — HEPARIN SODIUM 5000 UNITS: 5000 INJECTION INTRAVENOUS; SUBCUTANEOUS at 06:46

## 2020-12-15 RX ADMIN — VASOPRESSIN 0.1 UNITS: 20 INJECTION INTRAVENOUS at 09:24

## 2020-12-15 RX ADMIN — OXYCODONE HYDROCHLORIDE 5 MG: 5 TABLET ORAL at 10:48

## 2020-12-15 RX ADMIN — VASOPRESSIN 0.1 UNITS: 20 INJECTION INTRAVENOUS at 08:29

## 2020-12-15 RX ADMIN — FENTANYL CITRATE 100 MCG: 50 INJECTION INTRAMUSCULAR; INTRAVENOUS at 07:49

## 2020-12-15 RX ADMIN — VASOPRESSIN 0.1 UNITS: 20 INJECTION INTRAVENOUS at 08:25

## 2020-12-15 NOTE — DISCHARGE INSTRUCTIONS
Keep bra and dressings on until Saturday morning. May then remove and shower, apply antibiotic ointment, gauze and surgical bra  Start po antibiotic tonight and take as directed until gone  No lifting > 5 lbs  Ambulate in house every hour while awake  Follow up in approx one week        Mer 38 As tolerated or as directed by your doctor.  DO NOT wear tight or restrictive clothing.  Avoid heavy lifting or pulling (no more than 5 pounds). DIET   Clear liquids until no nausea or vomiting; then light diet for the first day.  Advance to regular diet on second day, unless your doctor orders otherwise.  If nausea or vomiting continue, call your doctor. PAIN   Take pain medication as directed by your doctor.  Call your doctor if pain is NOT relieved by medication.  DO NOT take aspirin or blood thinners unless directed by your doctor. DRESSINGS   If directed by your doctor, remove your dressings 48 hours after you go home. If your wound has small Steri-Strips undemeath the dressing, these may be left in place for at least one week, or as directed. SHOWERS AND BATHING   You may shower or bathe 48 hours after your surgery. DO NOT submerge the incisions or drain sites in a tub bath.  If you have drains, loop the tab through a belt or scarf tied around your waist, to free up your hands. Showering with the drains in place is not harmful. DRAINS   If you go home with a drain, you or a family member will be given care instructions. You will be shown how to empty, measure and record the output. This output number is important, as it will determine when the drain may be removed. The nursing staff will review drain care with you before you go home. FOLLOW-UP PHONE [address] will be made by nursing staff.  If you have any problems, call your doctor.     CALL YOUR DOCTOR IF YOU HAVE:   Excessive bleeding that does not stop after holding mild pressure over the area   Temperature of 101°F or above   Redness, excessive swelling or bruising, uneven size or hardness of the breast, andlor green or yellow, foul-smelling discharge from incision.  Excessive leakage around drain site    AFTER ANESTHESIA   For the first 24 hours: DO NOT drive, drink alcoholic beverages, or make important decisions.  Be aware of dizziness following anesthesia and while taking pain medication.

## 2020-12-15 NOTE — BRIEF OP NOTE
Brief Postoperative Note    Patient: Charan Araya  YOB: 1950  MRN: [id number]    Date of Procedure: 12/15/2020     Pre-Op Diagnosis: Personal history of breast cancer [Z85.3]    Post-Op Diagnosis: Same as preoperative diagnosis. Procedure(s):  BILATERAL BREAST TISSUE EXPANDER EXCHANGE FOR PERMANENT IMPLANT/ RECONSTRUCTION REVISION    Surgeon(s):  Harpreet Stark MD    Surgical Assistant: None    Anesthesia: General     Estimated Blood Loss (mL): Minimal    Complications: None    Specimens: * No specimens in log *     Implants:   Implant Name Type Inv. Item Serial No.  Lot No. LRB No. Used Action   GRAFT HUM TISS [id number] THK0.4-2. 4MM REGEN TISS MTRX PERF - [id number]  GRAFT HUM TISS [id number] THK0.4-2. 4MM REGEN TISS MTRX PERF [id number] [address]_ [id number] Right 1 Implanted   IMPLANT BRST 650CC DIA14.4CM P5.7CM [employer] RND HI - [id number]  IMPLANT BRST 650CC DIA14.4CM P5.7CM LOVE G[employer]OTH RND [place] [id number] [employer]_WD [id number] Right 1 Implanted   IMPLANT BRST 650CC DIA14.4CM P5.7CM [employer] G[employer] RND [place] - [id number]  IMPLANT BRST 650CC DIA14.4CM P5.7CM LOVE G[employer] SMOOTH RND [place] [id number] [employer]WD [id number] Left 1 Implanted       Drains: * No LDAs found *    Findings: within normal limits    Electronically Signed by Navin Healy MD on 12/15/2020 at 10:02 AM

## 2020-12-15 NOTE — ANESTHESIA POSTPROCEDURE EVALUATION
Procedure(s):  BILATERAL BREAST TISSUE EXPANDER EXCHANGE FOR PERMANENT IMPLANT/ RECONSTRUCTION REVISION.     general    Anesthesia Post Evaluation      Multimodal analgesia: multimodal analgesia used between 6 hours prior to anesthesia start to PACU discharge  Patient participation: complete - patient participated  Level of consciousness: awake  Pain management: adequate  Airway patency: patent  Anesthetic complications: no  Cardiovascular status: acceptable  Respiratory status: acceptable  Hydration status: acceptable  Post anesthesia nausea and vomiting:  none  Final Post Anesthesia Temperature Assessment:  Normothermia (36.0-37.5 degrees C)      INITIAL Post-op Vital signs:   Vitals Value Taken Time   /72 12/15/2020 10:46 AM   Temp 36.7 °C (98 °F) 12/15/2020 10:36 AM   Pulse 62 12/15/2020 10:46 AM   Resp 16 12/15/2020 10:46 AM   SpO2 93 % 12/15/2020 10:46 AM

## 2020-12-15 NOTE — ANESTHESIA PREPROCEDURE EVALUATION
Relevant Problems   No relevant active problems       Anesthetic History   No history of anesthetic complications            Review of Systems / Medical History  Patient summary reviewed and pertinent labs reviewed    Pulmonary  Within defined limits            Pertinent negatives: Sleep apnea: patient is unsure.      Neuro/Psych   Within defined limits           Cardiovascular    Hypertension: well controlled              Exercise tolerance: >4 METS  Comments: Denies recent CP, SOB or Palpitations   GI/Hepatic/Renal     GERD: well controlled      Hiatal hernia and liver disease (Fatty Liver)     Endo/Other        Morbid obesity, arthritis and cancer (Breast)     Other Findings              Physical Exam    Airway  Mallampati: III  TM Distance: > 6 cm  Neck ROM: normal range of motion   Mouth opening: Normal     Cardiovascular  Regular rate and rhythm,  S1 and S2 normal,  no murmur, click, rub, or gallop             Dental    Dentition: Edentulous     Pulmonary  Breath sounds clear to auscultation               Abdominal  GI exam deferred       Other Findings            Anesthetic Plan    ASA: 3  Anesthesia type: general          Induction: Intravenous  Anesthetic plan and risks discussed with: Patient and Family

## 2020-12-17 NOTE — OP NOTES
[address] [address]  OPERATIVE REPORT    Name:  Washington Mcguire  MR#:  [id number]  :  1950  ACCOUNT #:  [de-identified]  DATE OF SERVICE:  12/15/2020    PREOPERATIVE DIAGNOSIS:  Status post bilateral first stage breast reconstruction with tissue expanders. POSTOPERATIVE DIAGNOSIS:  Status post bilateral first stage breast reconstruction with tissue expanders. PROCEDURES PERFORMED:  Bilateral exchange of tissue expanders for permanent implants and bilateral revision of the reconstructed breasts and placement of acellular dermal matrix in the right breast.    SURGEON:  Krystyna Yanes MD    ASSISTANT:  None. ANESTHESIA:  General.    COMPLICATIONS:  none. SPECIMENS REMOVED:  None. IMPLANTS:  Nunez smooth round high profile 650 mL implants, catalog number [id number], right serial number [id number], left serial number [id number]. The AlloDerm was a 16 x 20 perforated thick sheet, lot number [id number]. ESTIMATED BLOOD LOSS:  15 mL. FLUIDS AT OPERATION:  1500 mL of crystalloid. CULTURES:  None. DRAINS:  None. INDICATIONS:  The patient had undergone bilateral mastectomies and immediate reconstruction with tissue expanders. At this point, she has completed the expansion process and is returned to the operating room at this time for exchange of the expanders for permanent implant and revision of the reconstructed breast.  The patient will require a lateral capsulorrhaphy on the right to help centralize the implant and then bilateral medial and superior capsulotomy to help centralize the implants on the chest wall and maximize projection. The risks and benefits of the proposed surgery were discussed with the patient at length and her questions were answered.   She states she understands the risks to include, but not be limited to:  Bleeding, infection, scars, asymmetry, poor cosmetic result, loss of part or all of the mastectomy flaps, implant malposition, deflation, migration, extrusion or encapsulation resulting in a firm, painful or distorted breast, hematoma or seroma formation, deep venous thrombosis, pulmonary emboli, the need for transfusion, the need for hospitalization and the remote risk of death. The patient also understands the implants have a limited life expectancy and will require replacement at some point in the future and can interfere with mammography. Understanding these issues, she wished to proceed. PROCEDURE:  The patient was met in the preoperative area, where the midline was marked from the sternal notch to the umbilicus and the inframammary folds were marked bilaterally. The patient was then taken to the operating room and placed in the supine position on a well-padded operating room table. Sequential compression devices were placed on her lower extremities. A pillow was placed behind her knees. Her heels were padded and her arms were secured to well-padded arm boards. After adequate induction of general anesthesia, the patient received IV antibiotics and the chest was prepped and draped in the usual sterile fashion. Beginning on the left, the preexisting incision was reopened and dissection was carried down to the periprosthetic capsule. The expander was exposed, deflated and removed. The pocket was inspected and there were no obvious abnormalities. With the aid of a lighted retractor, a medial and superior capsulotomy was performed. Multiple sizers were then placed and the 650 mL smooth round high profile implant appeared to give the best projection, shape and size. The skin was temporarily closed with surgical staples and attention was directed to the right breast.  The incision was reopened and the same procedure was performed to expose the implant, which was deflated and removed. The pocket was inspected and again, there were no obvious abnormalities. A medial and superior capsulotomy was performed to help improve projection.   The same 650 mL high profile implant was placed and the patient was sat upright and inspected. The patient clearly was in need of lateral capsulorrhaphy and the estimated site for this was marked internally after removal of the implant and the capsulorrhaphy was performed with 3-0 Mersilene dipped in Betadine. The sizer was replaced. The patient was sat upright and inspected and appeared to have a nice symmetry at this point. The right sizer was removed and the sheet of AlloDerm was soaked in saline for 5 minutes and then rinsed in vancomycin solution. It was trimmed to the necessary dimensions and laid in the periprosthetic pocket with proper orientation to cover over the lateral capsulorrhaphy and provide a sling to help support that area. It was tacked in circumferentially with running 3-0 PDS suture so that it was centered over the primary capsulorrhaphy. At this point, the sizer was returned. The patient was inspected and again had appropriate symmetry. Both sizers were removed and the pockets were irrigated with vancomycin solution and Betadine. A 10 mL of 0.25% Marcaine plain was placed in each pocket. The skin was reprepped with Betadine. The operative team donned new surgical gloves and the implants were opened and rinsed in Betadine and vancomycin solution. The implants were then placed in the pocket and the subcutaneous tissues were closed with a running 3-0 Vicryl and the skin was closed with a 4-0 Monocryl subcuticular suture. Dressings were applied. The patient was then placed in a surgical bra. She was awakened, extubated, and transferred to the recovery room in stable condition.       Man Goodwin MD      [id number]  D:  12/16/2020 20:23  T:  12/17/2020 13:50  JOB #:  [id number]  CC:  Lorin Bloch, MD

## 2021-02-03 ENCOUNTER — HOSPITAL ENCOUNTER (OUTPATIENT)
Dept: PHYSICAL THERAPY | Age: 71
Discharge: HOME | End: 2021-02-03
Payer: MEDICARE

## 2021-02-03 PROCEDURE — 97166 OT EVAL MOD COMPLEX 45 MIN: CPT

## 2021-02-03 NOTE — THERAPY EVALUATION
Jarrett Lunch  : 1950  Primary: Anila Dia Medicare Hmo  Secondary: [facility]  [address], [facility], [address]  [phone]   [phone]         OUTPATIENT OCCUPATIONAL THERAPY: Initial Assessment 2/3/2021    ICD-10: Treatment Diagnosis: I89.0, Lymphedema not elsewhere classified  M79.709, Pain in limbs  M79.89, Swelling of both lower extremtities  Precautions/Allergies:   Allegra allergy [fexofenadine], Fexofenadine-pseudoephedrine, Ace inhibitors, and Zyrtec [cetirizine]   MD Orders: eval and treat MEDICAL/REFERRING DIAGNOSIS:   Lymphedema, not elsewhere classified [I89.0]   DATE OF ONSET: Chronic   REFERRING PHYSICIAN: Karen Frost*  RETURN PHYSICIAN APPOINTMENT: to be determined      INITIAL ASSESSMENT:  Ms. Yaya Delacruz presents with increasing swelling in B LEs. She was previously a patient here and has returned due to increased swelling and pain B LEs at the posterior knee area. She presents with soft, pitting edema 2+ in ankles/legs. 1+ dorsum of feet. She presents with mild lymphostatic fibrosis and positive Stemmer sign bilaterally. Ms. Yaya Delacruz has Johney Jaylen wraps but does not wear compression on a daily basis; she is performing self MLD. She is here for reduction and to reduce heaviness in her legs. PLAN OF CARE:   PROBLEM LIST:  1. Increased Pain  2. Decreased Activity Tolerance  3. Edema/Girth  4. Decreased Knowledge of Precautions  5. Decreased Skin Integrity/Hygeine  6. Decreased Phelps with Home Exercise Program INTERVENTIONS PLANNED  1. Skin care  2. Compression bandaging  3. Fitting for compression garment(s)  4. Manual therapy/Manual lymph drainage  5. Therapeutic exercise/Therapeutic activities  6. Patient Education  7. Compression pump trial prn  8.  kinesiotaping    TREATMENT PLAN:  Effective Dates: 2/3/2021 TO 2021 (90 days).   Frequency/Duration: 2 times a week for 90 Day(s)  Will adjust frequency and duration as progress indicates. GOALS: (Goals have been discussed and agreed upon with patient.)  Short-Term Functional Goals: Time Frame: 45 days  1. The patient/caregiver will verbalize understanding of lymphedema precautions. 2. Patient will be independent with skin care regimen to decrease risk of cellulitis. 3. Patient will be independent in lymphatic exercises. Discharge Goals: Time Frame: 90 days  1. Patient's bilateral lower extremity circumferential measurements will decrease 5 cm or more on volumetric graph to maximize functional use in ADL's.    2. The patient/caregiver will be independent with home management of lymphedema. 3. Patient/caregiver will be independent donning and doffing bilateral lower extremity compression garment. Rehabilitation Potential For Stated Goals: [address] therapy, I certify that the treatment plan above will be carried out by a therapist or under their direction. Thank you for this referral,  Nella Nino OT     Referring Physician Signature: Baldev Blank* _________________________  Date _________            The information in this section was collected on 2/3/2021   (except where otherwise noted). OCCUPATIONAL PROFILE & HISTORY:   History of Present Injury/Illness (Reason for Referral):  Ms. Luis Sagastume presents with increasing swelling in B LEs. She was previously a patient here and has returned due to increased swelling and pain B LEs at the posterior knee area. She presents with soft, pitting edema 2+ in ankles/legs. 1+ dorsum of feet. She presents with mild lymphostatic fibrosis and positive Stemmer sign bilaterally. Ms. Luis Sagastume has Rhonda Evan wraps but does not wear compression on a daily basis; she is performing self MLD. She is here for reduction and to reduce heaviness in her legs.     Past Medical History/Comorbidities:   Ms. Luis Sagastume  has a past medical history of Arthritis, Breast cancer ([facility] [address] Chronically dry eyes, bilateral, CKD (chronic kidney disease), Colon polyps, Dyslipidemia, GERD (gastroesophageal reflux disease), Gout, Hiatal hernia, Hypertension, Liver disease, Phlebitis, Prediabetes, and Snores. Ms. Jaclyn Scott  has a past surgical history that includes hx hysterectomy (2003); hx cataract removal (Bilateral); hx bladder suspension; hx dilation and curettage; hx colonoscopy (2006); hx mastectomy (Bilateral, 8/20/2020); hx breast reconstruction (Bilateral, 8/20/2020); and hx breast reconstruction (Bilateral, 12/15/2020). Social History/Living Environment:    Lives with   Prior Level of Function/Work/Activity:  Independent/retired/sedentary  Dominant Side:         RIGHT  Previous Treatment Approaches:          Complete decongestive therapy, Farrow wraps   Ambulatory/Rehab Services H2 Model Falls Risk Assessment    Risk Factors:       No Risk Factors Identified Ability to Rise from Chair:       (1)  Pushes up, successful in one attempt    Falls Prevention Plan:       No modifications necessary   Total: (5 or greater = High Risk): 1    ©2010 [facility] [employer]. All Rights Reserved. [facility] Patent #[id number]. Federal Law prohibits the replication, distribution or use without written permission from [facility]     Current Medications:    Current Outpatient Medications:     metFORMIN (GLUCOPHAGE) 500 mg tablet, Take 500 mg by mouth daily (with breakfast). , Disp: , Rfl:     anastrozole (Arimidex) 1 mg tablet, Take 1 mg by mouth daily. , Disp: 30 Tab, Rfl: 5    atorvastatin (LIPITOR) 40 mg tablet, Take 40 mg by mouth nightly., Disp: , Rfl:     NIFEdipine ER (PROCARDIA XL) 90 mg ER tablet, Take 90 mg by mouth daily. , Disp: , Rfl:     metoprolol succinate (TOPROL-XL) 50 mg XL tablet, Take 50 mg by mouth daily. , Disp: , Rfl:     omeprazole (PRILOSEC) 40 mg capsule, Take 40 mg by mouth daily. , Disp: , Rfl:     cholecalciferol (Vitamin D3) 25 mcg (1,000 unit) cap, Take 1,000 Units by mouth daily. , Disp: , Rfl:     polysorbate 80/glycerin (REFRESH DRY EYE THERAPY OP), Apply  to eye daily as needed. , Disp: , Rfl:     aspirin (ASPIRIN) 325 mg tablet, Take 81 mg by mouth daily. , Disp: , Rfl:             Date Last Reviewed:  2/3/2021     Complexity Level : Expanded review of therapy/medical records (1-2):  MODERATE COMPLEXITY   ASSESSMENT OF OCCUPATIONAL PERFORMANCE:   Palpation:          2+ soft, pitting edema B LEs  ROM:          WFL  Strength:          Generalized deconditioning  Special Tests:           Positive Stemmer's sign (inability to pinch skin at base of second toe)   Skin Integrity:          mild lymphostatic fibrosis  Sensation:  Intact  Functional Mobility: Independent   Activities of Daily Living Independent  Edema/Girth:  2+ and pitting   PRETREATMENT AFFECTED LIMB(s): lower extremity      Date:  2.3.21         Right / Left           Groin   []      []           8 inches   []      []           4 inches   []      []         PoplitealSpace   []      [] 45/44          8 inches   []      [] 50/52          4 inches   []      [] 38/37          Ankle   []      [] 30/33          Instep   []      [] 21/21        Measurements are taken in centimeters:  2.54 cm = 1 inch  Cumulative Circumferential Volumetric Graph Measurements  RIGHT  cm        LEFT  cm               Physical Skills Involved:  1. Activity Tolerance  2. Pain (Chronic)  3. Edema  4. Skin Integrity Cognitive Skills Affected (resulting in the inability to perform in a timely and safe manner):  1. N/A Psychosocial Skills Affected:  1. Habits/Routines   Number of elements that affect the Plan of Care: 3-5:  MODERATE COMPLEXITY   CLINICAL DECISION MAKING:   Outcome Measure: Tool Used: Lymphedema Life Impact Scale   Score:  Initial: 36 Most Recent: X (Date: -- )   Interpretation of Score:  The Lymphedema Life Impact Scale (LLIS) is a validated instrument that measures the physical, functional, and psychosocial concerns pertinent to patients with extremity lymphedema. The Scale's questionnaire is administered to patients to gauge impairments, activity limitations, and participation restrictions resulting from their lymphedema. Score 0 1-13 14-26 27-40 41-54 55-67 68   Modifier CH CI CJ CK CL CM CN     ? Other PT/OT Primary Functional Limitations:     - CURRENT STATUS: CK - 40%-59% impaired, limited or restricted    - GOAL STATUS: CJ - 20%-39% impaired, limited or restricted    - D/C STATUS:  ---------------To be determined---------------       Medical Necessity:   · Patient is expected to demonstrate progress in self-management of B LE lymphedema to reduce risk of cellulitis. Reason for Services/Other Comments:  · Patient continues to require present interventions due to patient's inability to manage swelling in B LEs due to secondary lymphedema. Use of outcome tool(s) and clinical judgement create a POC that gives a: Questionable prediction of patient's progress: MODERATE COMPLEXITY   TREATMENT:   (In addition to Assessment/Re-Assessment sessions the following treatments were rendered)    Pre-treatment Symptoms/Complaints:  Limb heaviness, pain posterior knee, increasing fullness in legs  Pain: Initial:   Pain Intensity 1: 6  Post Session:  6   Occupational Therapy Treatments:    OT eval( X ) OT eval was completed. Pt received information on lymphedema and risk reduction/self management practices as outlined by the National Lymphedema Network. Therapeutic Exercise ( minutes):     HEP:  As above; handouts given to patient for all exercises.   Manual Therapy:(  minutes)          Manual Lymph Drainage:          Lymph Nodes:    Cervical Supraclavicular Axillary Abdominal Inguinal Popliteal Antecubital   RIGHT []     []     []     []     []     []     []       LEFT []     []     []     []     []     []     []          Anastamoses:   Axillo-axillary Inguino-inguinal Axillo-inguinal Inguino-axillary   ANTERIOR []     []     []     []       POSTERIOR []     []     []     []       RIGHT []     []     []     []       LEFT []     []     []     []         Limbs:   []    RUE     []    LUE     []    RLE    []    LLE   Self Care/ADLs (     Minutes):     Treatment/Session Assessment:    · Response to Treatment:  Eval only; pt in agreement with POC and goals. · Compliance with Program/Exercises: Will assess as treatment progresses. · Recommendations/Intent for next treatment session: Next visit will focus on phase 1 complete decongestive therapy.   Total Treatment Duration:  40 minutes  OT Patient Time In/Time Out  Time In: 1000  Time Out: [address], IGNACIAR/L, CLT

## 2021-04-21 ENCOUNTER — APPOINTMENT (OUTPATIENT)
Dept: PHYSICAL THERAPY | Age: 71
End: 2021-04-21
Payer: MEDICARE

## 2021-04-26 ENCOUNTER — HOSPITAL ENCOUNTER (OUTPATIENT)
Dept: PHYSICAL THERAPY | Age: 71
Discharge: HOME | End: 2021-04-26
Payer: MEDICARE

## 2021-04-26 NOTE — PROGRESS NOTES
Anita Goff  : 1950  Primary: Ben Dia Medicare Hmo  Secondary: [facility] Therapy  [address], [facility] [address]  [phone]   [phone]        OUTPATIENT DAILY NOTE    NAME/AGE/GENDER: Anita Goff is a 69 y.o. female. DATE: 2021    Ms. Ten San canceled her appointment today.     Sim Pandya, OTR/L, CLT-MALA

## 2021-04-28 ENCOUNTER — HOSPITAL ENCOUNTER (OUTPATIENT)
Dept: PHYSICAL THERAPY | Age: 71
Discharge: HOME | End: 2021-04-28
Payer: MEDICARE

## 2021-04-28 PROCEDURE — 97535 SELF CARE MNGMENT TRAINING: CPT

## 2021-04-28 PROCEDURE — 97140 MANUAL THERAPY 1/> REGIONS: CPT

## 2021-04-28 NOTE — THERAPY EVALUATION
Erik Kessler : 1950 Primary: [employer] Hmo Secondary: 401 [facility] Therapy 
[address], [facility], [address] [phone]   [phone] OUTPATIENT OCCUPATIONAL THERAPY: Initial Assessment 2021 ICD-10: Treatment Diagnosis: I89.0, Lymphedema not elsewhere classified 
M79.709, Pain in limbs 
M79.89, Swelling of both lower extremtities Precautions/Allergies:  
Allegra allergy [fexofenadine], Fexofenadine-pseudoephedrine, Ace inhibitors, and Zyrtec [cetirizine] MD Orders: eval and treat MEDICAL/REFERRING DIAGNOSIS:  
Lymphedema, not elsewhere classified [I89.0] DATE OF ONSET: Chronic REFERRING PHYSICIAN: Lj Collins* RETURN PHYSICIAN APPOINTMENT: to be determined INITIAL ASSESSMENT:  Ms. Jeff Paulson presents with increasing swelling in B LEs. She was previously a patient here and has returned due to increased swelling and pain B LEs at the posterior knee area. She presents with soft, pitting edema 2+ in ankles/legs. 1+ dorsum of feet. She presents with mild lymphostatic fibrosis and positive Stemmer sign bilaterally. Ms. Jeff Paulson has Cesar Fang wraps but does not wear compression on a daily basis; she is performing self MLD. She is here for reduction and to reduce heaviness in her legs. PLAN OF CARE:  
PROBLEM LIST: 
1. Increased Pain 2. Decreased Activity Tolerance 3. Edema/Girth 4. Decreased Knowledge of Precautions 5. Decreased Skin Integrity/Hygeine 6. Decreased [place] with Home Exercise Program INTERVENTIONS PLANNED 1. Skin care 2. Compression bandaging 3. Fitting for compression garment(s) 4. Manual therapy/Manual lymph drainage 5. Therapeutic exercise/Therapeutic activities 6. Patient Education 7. Compression pump trial prn 
8.  kinesiotaping TREATMENT PLAN: 
Effective Dates: 2/3/2021 TO 2021 (90 days).   Frequency/Duration: 2 times a week for 90 Day(s) Will adjust frequency and duration as progress indicates. GOALS: (Goals have been discussed and agreed upon with patient.) Short-Term Functional Goals: Time Frame: 45 days 1. The patient/caregiver will verbalize understanding of lymphedema precautions. 2. Patient will be independent with skin care regimen to decrease risk of cellulitis. 3. Patient will be independent in lymphatic exercises. Discharge Goals: Time Frame: 90 days 1. Patient's bilateral lower extremity circumferential measurements will decrease 5 cm or more on volumetric graph to maximize functional use in ADL's.   
2. The patient/caregiver will be independent with home management of lymphedema. 3. Patient/caregiver will be independent donning and doffing bilateral lower extremity compression garment. Rehabilitation Potential For Stated Goals: Fair Regarding Arturo Sandoval's therapy, I certify that the treatment plan above will be carried out by a therapist or under their direction. Thank you for this referral, Eliza Mccray OT Referring Physician Signature: Juliocesar Lieu _________________________  Date _________ The information in this section was collected on 4/28/2021 
 (except where otherwise noted). OCCUPATIONAL PROFILE & HISTORY:  
History of Present Injury/Illness (Reason for Referral): Ms. Nino Mccauley presents with increasing swelling in B LEs. She was previously a patient here and has returned due to increased swelling and pain B LEs at the posterior knee area. She presents with soft, pitting edema 2+ in ankles/legs. 1+ dorsum of feet. She presents with mild lymphostatic fibrosis and positive Stemmer sign bilaterally. Ms. Nino Mccauley has Eliana Furry wraps but does not wear compression on a daily basis; she is performing self MLD. She is here for reduction and to reduce heaviness in her legs. Past Medical History/Comorbidities: Ms. Nino Mccauley  has a past medical history of Arthritis, Breast cancer ([facility] [address] Chronically dry eyes, bilateral, CKD (chronic kidney disease), Colon polyps, Dyslipidemia, GERD (gastroesophageal reflux disease), Gout, Hiatal hernia, Hypertension, Liver disease, Phlebitis, Prediabetes, and Snores. Ms. Macho Nava  has a past surgical history that includes hx hysterectomy (2003); hx cataract removal (Bilateral); hx bladder suspension; hx dilation and curettage; hx colonoscopy (2006); hx mastectomy (Bilateral, 8/20/2020); hx breast reconstruction (Bilateral, 8/20/2020); and hx breast reconstruction (Bilateral, 12/15/2020). Social History/Living Environment:  
 Lives with  Prior Level of Function/Work/Activity: 
Independent/retired/sedentary Dominant Side:  
      RIGHT Previous Treatment Approaches:   
      Complete decongestive therapy, Farrow wraps Ambulatory/Rehab Services H2 Model Falls Risk Assessment Risk Factors: 
     No Risk Factors Identified Ability to Rise from Chair: 
     (1)  Pushes up, successful in one attempt Falls Prevention Plan: No modifications necessary Total: (5 or greater = High Risk): 1 ©2010 [facility] of [address] Patent #[id number]. Federal Law prohibits the replication, distribution or use without written permission from [facility] [place] Current Medications:   
Current Outpatient Medications:  
  anastrozole (Arimidex) 1 mg tablet, Take 1 mg by mouth daily. , Disp: 30 Tab, Rfl: 5 
  metFORMIN (GLUCOPHAGE) 500 mg tablet, Take 500 mg by mouth daily (with breakfast). , Disp: , Rfl:  
  atorvastatin (LIPITOR) 40 mg tablet, Take 40 mg by mouth nightly., Disp: , Rfl:  
  NIFEdipine ER (PROCARDIA XL) 90 mg ER tablet, Take 90 mg by mouth daily. , Disp: , Rfl:  
  metoprolol succinate (TOPROL-XL) 50 mg XL tablet, Take 50 mg by mouth daily. , Disp: , Rfl:  
  omeprazole (PRILOSEC) 40 mg capsule, Take 40 mg by mouth daily. , Disp: , Rfl:  
  cholecalciferol (Vitamin D3) 25 mcg (1,000 unit) cap, Take 1,000 Units by mouth daily. , Disp: , Rfl:  
  polysorbate 80/glycerin (REFRESH DRY EYE THERAPY OP), Apply  to eye daily as needed. , Disp: , Rfl:  
  aspirin (ASPIRIN) 325 mg tablet, Take 81 mg by mouth daily. , Disp: , Rfl:   
        
Date Last Reviewed:  4/28/2021 Complexity Level : Expanded review of therapy/medical records (1-2):  MODERATE COMPLEXITY ASSESSMENT OF OCCUPATIONAL PERFORMANCE:  
Palpation:   
      2+ soft, pitting edema B LEs 
ROM:   
      [facility] Strength:   
      Generalized deconditioning Special Tests:   
       Positive Stemmer's sign (inability to pinch skin at base of second toe) Skin Integrity:   
      mild lymphostatic fibrosis Sensation:  Intact Functional Mobility: Independent Activities of Daily Living Independent Edema/Girth:  2+ and pitting PRETREATMENT AFFECTED LIMB(s): lower extremity Date:  2.3.21 Right / Left Groin  
[]      []        
 
8 inches  
[]      []        
 
4 inches  
[]      [] PoplitealSpace  
[]      [] 45/44       
 
8 inches  
[]      [] 50/52       
 
4 inches  
[]      [] 38/37 Ankle  
[]      [] 30/33 Instep  
[]      [] 21/21 Measurements are taken in centimeters:  2.54 cm = 1 inch Cumulative Circumferential Volumetric Graph Measurements RIGHT  cm LEFT  cm Physical Skills Involved: 1. Activity Tolerance 2. Pain (Chronic) 3. Edema 4. Skin Integrity Cognitive Skills Affected (resulting in the inability to perform in a timely and safe manner): 
1. N/A Psychosocial Skills Affected: 1. Habits/Routines Number of elements that affect the Plan of Care: 3-5:  MODERATE COMPLEXITY CLINICAL DECISION MAKING:  
Outcome Measure: Tool Used: Lymphedema Life Impact Scale Score:  Initial: 36 Most Recent: X (Date: -- ) Interpretation of Score:  The Lymphedema Life Impact Scale (LLIS) is a validated instrument that measures the physical, functional, and psychosocial concerns pertinent to patients with extremity lymphedema. The Scale's questionnaire is administered to patients to gauge impairments, activity limitations, and participation restrictions resulting from their lymphedema. Score 0 1-13 14-26 27-40 41-54 55-67 68 Modifier CH CI CJ CK CL CM CN  
 
? Other PT/OT Primary Functional Limitations:  
  - CURRENT STATUS: CK - 40%-59% impaired, limited or restricted  - GOAL STATUS: CJ - 20%-39% impaired, limited or restricted  - D/C STATUS:  ---------------To be determined--------------- Medical Necessity:  
· Patient is expected to demonstrate progress in self-management of B LE lymphedema to reduce risk of cellulitis. Reason for Services/Other Comments: 
· Patient continues to require present interventions due to patient's inability to manage swelling in B LEs due to secondary lymphedema. Use of outcome tool(s) and clinical judgement create a POC that gives a: Questionable prediction of patient's progress: MODERATE COMPLEXITY  
TREATMENT:  
(In addition to Assessment/Re-Assessment sessions the following treatments were rendered) Pre-treatment Symptoms/Complaints:  Pt has not been back to therapy since 2-3-21 with no clear explanation as to why. She states podiatrist recommended she return to therapy due to increased LE swelling. Pt has not been wearing Daneil Tattnall or compression socks also with no clear explanation as to why. She has started walking 2.5miles every day for the last 3 weeks. Pain: Initial:  
Pain Intensity 1: 4  Post Session:  4 Occupational Therapy Treatments: 
 
OT eval( X ) OT eval was completed. Pt received information on lymphedema and risk reduction/self management practices as outlined by the National Lymphedema Network. Therapeutic Exercise ( minutes): HEP:  As above; handouts given to patient for all exercises.  
Manual Therapy:( 40minutes) After deep breathing, MLD to decrease bilateral LE lymphedema with pt performing 20 ankle pumps/toe clenches. Skin care provided with Eucerin lotion Manual Lymph Drainage: 
        Lymph Nodes:  
 Cervical Supraclavicular Axillary Abdominal Inguinal Popliteal Antecubital  
RIGHT []     []     []     []     []     []     [] LEFT []     []     []     []     []     []     [] Anastamoses: Axillo-axillary Inguino-inguinal Axillo-inguinal Inguino-axillary ANTERIOR []     []     []     [] POSTERIOR []     []     []     []      
RIGHT []     []     []     [] LEFT []     []     []     []      
 
Limbs:   []    RUE     []    LUE     []    RLE    []    LLE Self Care/ADLs (   15  Minutes): Compression bandaging applied to bilateral LE's from foot to knee using 5'jeffery , rosidal rolled foam and 2 short stretch bandages each leg. The importance of consistent, daily compression was emphasized to pt. When she removes bandages she will wear existing Mechele Roughen as she will only be able to come to therapy 1x/week. Treatment/Session Assessment:   
· Response to Treatment:  Pt had 12 visits approved between 2-3-21 to 21 but only came to eval and 1 appt. As insurance approval is about to , new visits will have to be requested. Due to distance pt has to travel to therapy and other appointments, she will only be able to commit to therapy 1x/week. Bandages will be applied in therapy but when it is time to remove them she will remove and wear existing Mechele Roughen to have consistent, daily compression. She agrees with POC. · Compliance with Program/Exercises: Will assess as treatment progresses. · Recommendations/Intent for next treatment session: Next visit will focus on phase 1 complete decongestive therapy. Total Treatment Duration:  55 minutes OT Patient Time In/Time Out Time In: 1100 Time Out: 1155 Jair Cruz OTR/MAYRA, CLT

## 2021-04-28 NOTE — PROGRESS NOTES
Krupa Brown : 1950 Primary: [employer] Hmo Secondary: 401 [facility] Therapy 
[address], [facility] [address] [phone]   [phone] OUTPATIENT DAILY NOTE 
 
NAME/AGE/GENDER: Krupa Brown is a 69 y.o. female. DATE: 2021 Ms. Maddie Mohamud did not show for sheduled appointment. Kurt Garcia, OTR/L, CLT-MALA

## 2021-05-05 ENCOUNTER — HOSPITAL ENCOUNTER (OUTPATIENT)
Dept: PHYSICAL THERAPY | Age: 71
Discharge: HOME | End: 2021-05-05

## 2021-05-05 NOTE — PROGRESS NOTES
Krupa Brown  : 1950  Primary: Jamal López [employer] Medicare Hmo  Secondary: 401 [facility] Therapy  [address], [facility] [address]  [phone]   [phone]        OUTPATIENT DAILY NOTE    NAME/AGE/GENDER: Krupa Brown is a 69 y.o. female. DATE: 2021    Ms. Maddie Mohamud cancelled her appointment today    Kurt Garcia, OTR/L, CLT-MALA

## 2021-05-05 NOTE — THERAPY DISCHARGE
Doug Eli : 1950 Primary: [employer] Hmo Secondary: 401 [facility] Therapy 
[address], [address] [phone]   [phone] OUTPATIENT OCCUPATIONAL THERAPY: Discharge 2021 ICD-10: Treatment Diagnosis: I89.0, Lymphedema not elsewhere classified 
M79.709, Pain in limbs 
M79.89, Swelling of both lower extremtities Precautions/Allergies:  
Allegra allergy [fexofenadine], Fexofenadine-pseudoephedrine, Ace inhibitors, and Zyrtec [cetirizine] MD Orders: eval and treat MEDICAL/REFERRING DIAGNOSIS:  
Lymphedema, not elsewhere classified [I89.0] DATE OF ONSET: Chronic REFERRING PHYSICIAN: Christian Pearl* RETURN PHYSICIAN APPOINTMENT: to be determined DISCHARGE:  Ms. Jany Mary called and reported her lymphedema was under control and she returned to work. She is wearing Farrow wraps daily. She requested discharge from therapy. INITIAL ASSESSMENT:  Ms. Jany Mary presents with increasing swelling in B LEs. She was previously a patient here and has returned due to increased swelling and pain B LEs at the posterior knee area. She presents with soft, pitting edema 2+ in ankles/legs. 1+ dorsum of feet. She presents with mild lymphostatic fibrosis and positive Stemmer sign bilaterally. Ms. Jany Mary has Cherlynn Apo wraps but does not wear compression on a daily basis; she is performing self MLD. She is here for reduction and to reduce heaviness in her legs. PLAN OF CARE:  
PROBLEM LIST: 
1. Increased Pain 2. Decreased Activity Tolerance 3. Edema/Girth 4. Decreased Knowledge of Precautions 5. Decreased Skin Integrity/Hygeine 6. Decreased [place] with Home Exercise Program INTERVENTIONS PLANNED 1. Skin care 2. Compression bandaging 3. Fitting for compression garment(s) 4. Manual therapy/Manual lymph drainage 5. Therapeutic exercise/Therapeutic activities 6.   Patient Education 7. Compression pump trial prn 
8.  kinesiotaping TREATMENT PLAN: 
Effective Dates: 2/3/2021 TO 5/4/2021 (90 days). Frequency/Duration: 2 times a week for 90 Day(s) Will adjust frequency and duration as progress indicates. GOALS: (Goals have been discussed and agreed upon with patient.) Short-Term Functional Goals: Time Frame: 45 days 1. The patient/caregiver will verbalize understanding of lymphedema precautions. Goal met 2. Patient will be independent with skin care regimen to decrease risk of cellulitis. Goal met 3. Patient will be independent in lymphatic exercises. Goal met Discharge Goals: Time Frame: 90 days 1. Patient's bilateral lower extremity circumferential measurements will decrease 5 cm or more on volumetric graph to maximize functional use in ADL's. Not met 2. The patient/caregiver will be independent with home management of lymphedema. Goal met 3. Patient/caregiver will be independent donning and doffing bilateral lower extremity compression garment. Goal met

## 2021-05-25 ENCOUNTER — HOSPITAL ENCOUNTER (OUTPATIENT)
Dept: LAB | Age: 71
Discharge: HOME | End: 2021-05-25
Payer: MEDICARE

## 2021-05-25 DIAGNOSIS — C50.911 MALIGNANT NEOPLASM OF RIGHT BREAST IN FEMALE, ESTROGEN RECEPTOR POSITIVE, UNSPECIFIED SITE OF BREAST (HCC): ICD-10-CM

## 2021-05-25 DIAGNOSIS — Z17.0 MALIGNANT NEOPLASM OF RIGHT BREAST IN FEMALE, ESTROGEN RECEPTOR POSITIVE, UNSPECIFIED SITE OF BREAST (HCC): ICD-10-CM

## 2021-05-25 LAB
ALBUMIN SERPL-MCNC: 3.6 G/DL (ref 3.2–4.6)
ALBUMIN/GLOB SERPL: 0.8 {RATIO} (ref 1.2–3.5)
ALP SERPL-CCNC: 122 U/L (ref 50–136)
ALT SERPL-CCNC: 25 U/L (ref 12–65)
ANION GAP SERPL CALC-SCNC: 6 MMOL/L (ref 7–16)
AST SERPL-CCNC: 14 U/L (ref 15–37)
BASOPHILS # BLD: 0 K/UL (ref 0–0.2)
BASOPHILS NFR BLD: 1 % (ref 0–2)
BILIRUB SERPL-MCNC: 0.3 MG/DL (ref 0.2–1.1)
BUN SERPL-MCNC: 16 MG/DL (ref 8–23)
CALCIUM SERPL-MCNC: 8.9 MG/DL (ref 8.3–10.4)
CHLORIDE SERPL-SCNC: 109 MMOL/L (ref 98–107)
CO2 SERPL-SCNC: 25 MMOL/L (ref 21–32)
CREAT SERPL-MCNC: 1.2 MG/DL (ref 0.6–1)
DIFFERENTIAL METHOD BLD: NORMAL
EOSINOPHIL # BLD: 0.1 K/UL (ref 0–0.8)
EOSINOPHIL NFR BLD: 3 % (ref 0.5–7.8)
ERYTHROCYTE [DISTWIDTH] IN BLOOD BY AUTOMATED COUNT: 12.9 % (ref 11.9–14.6)
GLOBULIN SER CALC-MCNC: 4.6 G/DL (ref 2.3–3.5)
GLUCOSE SERPL-MCNC: 120 MG/DL (ref 65–100)
HCT VFR BLD AUTO: 44.3 % (ref 35.8–46.3)
HGB BLD-MCNC: 14.5 G/DL (ref 11.7–15.4)
IMM GRANULOCYTES # BLD AUTO: 0 K/UL (ref 0–0.5)
IMM GRANULOCYTES NFR BLD AUTO: 0 % (ref 0–5)
LYMPHOCYTES # BLD: 1.7 K/UL (ref 0.5–4.6)
LYMPHOCYTES NFR BLD: 36 % (ref 13–44)
MCH RBC QN AUTO: 30.5 PG (ref 26.1–32.9)
MCHC RBC AUTO-ENTMCNC: 32.7 G/DL (ref 31.4–35)
MCV RBC AUTO: 93.3 FL (ref 79.6–97.8)
MONOCYTES # BLD: 0.4 K/UL (ref 0.1–1.3)
MONOCYTES NFR BLD: 9 % (ref 4–12)
NEUTS SEG # BLD: 2.5 K/UL (ref 1.7–8.2)
NEUTS SEG NFR BLD: 52 % (ref 43–78)
NRBC # BLD: 0 K/UL (ref 0–0.2)
PLATELET # BLD AUTO: 260 K/UL (ref 150–450)
PMV BLD AUTO: 10 FL (ref 9.4–12.3)
POTASSIUM SERPL-SCNC: 4 MMOL/L (ref 3.5–5.1)
PROT SERPL-MCNC: 8.2 G/DL (ref 6.3–8.2)
RBC # BLD AUTO: 4.75 M/UL (ref 4.05–5.2)
SODIUM SERPL-SCNC: 140 MMOL/L (ref 136–145)
WBC # BLD AUTO: 4.8 K/UL (ref 4.3–11.1)

## 2021-05-25 PROCEDURE — 80053 COMPREHEN METABOLIC PANEL: CPT

## 2021-05-25 PROCEDURE — 85025 COMPLETE CBC W/AUTO DIFF WBC: CPT

## 2021-05-25 PROCEDURE — 36415 COLL VENOUS BLD VENIPUNCTURE: CPT

## 2022-02-10 ENCOUNTER — HOSPITAL ENCOUNTER (OUTPATIENT)
Dept: LAB | Age: 72
Discharge: HOME | End: 2022-02-10
Payer: MEDICARE

## 2022-02-10 DIAGNOSIS — C50.911 MALIGNANT NEOPLASM OF RIGHT BREAST IN FEMALE, ESTROGEN RECEPTOR POSITIVE, UNSPECIFIED SITE OF BREAST (HCC): ICD-10-CM

## 2022-02-10 DIAGNOSIS — Z17.0 MALIGNANT NEOPLASM OF RIGHT BREAST IN FEMALE, ESTROGEN RECEPTOR POSITIVE, UNSPECIFIED SITE OF BREAST (HCC): ICD-10-CM

## 2022-02-10 LAB
ALBUMIN SERPL-MCNC: 3.8 G/DL (ref 3.2–4.6)
ALBUMIN/GLOB SERPL: 0.8 {RATIO} (ref 1.2–3.5)
ALP SERPL-CCNC: 108 U/L (ref 50–136)
ALT SERPL-CCNC: 25 U/L (ref 12–65)
ANION GAP SERPL CALC-SCNC: 6 MMOL/L (ref 7–16)
AST SERPL-CCNC: 20 U/L (ref 15–37)
BASOPHILS # BLD: 0 K/UL (ref 0–0.2)
BASOPHILS NFR BLD: 1 % (ref 0–2)
BILIRUB SERPL-MCNC: 0.4 MG/DL (ref 0.2–1.1)
BUN SERPL-MCNC: 16 MG/DL (ref 8–23)
CALCIUM SERPL-MCNC: 9.2 MG/DL (ref 8.3–10.4)
CHLORIDE SERPL-SCNC: 106 MMOL/L (ref 98–107)
CO2 SERPL-SCNC: 27 MMOL/L (ref 21–32)
CREAT SERPL-MCNC: 1.1 MG/DL (ref 0.6–1)
DIFFERENTIAL METHOD BLD: ABNORMAL
EOSINOPHIL # BLD: 0.2 K/UL (ref 0–0.8)
EOSINOPHIL NFR BLD: 4 % (ref 0.5–7.8)
ERYTHROCYTE [DISTWIDTH] IN BLOOD BY AUTOMATED COUNT: 12.7 % (ref 11.9–14.6)
GLOBULIN SER CALC-MCNC: 4.8 G/DL (ref 2.3–3.5)
GLUCOSE SERPL-MCNC: 109 MG/DL (ref 65–100)
HCT VFR BLD AUTO: 48 % (ref 35.8–46.3)
HGB BLD-MCNC: 15.4 G/DL (ref 11.7–15.4)
IMM GRANULOCYTES # BLD AUTO: 0 K/UL (ref 0–0.5)
IMM GRANULOCYTES NFR BLD AUTO: 0 % (ref 0–5)
LYMPHOCYTES # BLD: 1.6 K/UL (ref 0.5–4.6)
LYMPHOCYTES NFR BLD: 29 % (ref 13–44)
MCH RBC QN AUTO: 29.7 PG (ref 26.1–32.9)
MCHC RBC AUTO-ENTMCNC: 32.1 G/DL (ref 31.4–35)
MCV RBC AUTO: 92.5 FL (ref 79.6–97.8)
MONOCYTES # BLD: 0.4 K/UL (ref 0.1–1.3)
MONOCYTES NFR BLD: 8 % (ref 4–12)
NEUTS SEG # BLD: 3.2 K/UL (ref 1.7–8.2)
NEUTS SEG NFR BLD: 58 % (ref 43–78)
NRBC # BLD: 0 K/UL (ref 0–0.2)
PLATELET # BLD AUTO: 251 K/UL (ref 150–450)
PMV BLD AUTO: 10 FL (ref 9.4–12.3)
POTASSIUM SERPL-SCNC: 3.7 MMOL/L (ref 3.5–5.1)
PROT SERPL-MCNC: 8.6 G/DL (ref 6.3–8.2)
RBC # BLD AUTO: 5.19 M/UL (ref 4.05–5.2)
SODIUM SERPL-SCNC: 139 MMOL/L (ref 136–145)
WBC # BLD AUTO: 5.5 K/UL (ref 4.3–11.1)

## 2022-02-10 PROCEDURE — 85025 COMPLETE CBC W/AUTO DIFF WBC: CPT

## 2022-02-10 PROCEDURE — 36415 COLL VENOUS BLD VENIPUNCTURE: CPT

## 2022-02-10 PROCEDURE — 80053 COMPREHEN METABOLIC PANEL: CPT

## 2022-02-16 ENCOUNTER — HOSPITAL ENCOUNTER (OUTPATIENT)
Dept: PHYSICAL THERAPY | Age: 72
Discharge: HOME | End: 2022-02-16
Payer: MEDICARE

## 2022-02-16 PROCEDURE — 97161 PT EVAL LOW COMPLEX 20 MIN: CPT

## 2022-02-16 NOTE — PROGRESS NOTES
María Corcoran  : 1950  Primary: Jose [employer] Hmo  Secondary: 401 [facility] Therapy  [address], [address]  [phone]   [phone]         OUTPATIENT PHYSICAL THERAPY:Daily Note 2022      TREATMENT:   PT Patient Time In/Time Out  Time In: 0930  Time Out: 1015      Total Time: 45min  Visit Count:  1     ICD-10: Treatment Diagnosis: M25.562  Medication Last Reviewed: 22      TREATMENT PLAN  Effective Dates: 2022 TO 2022 (90 days). Frequency/Duration: 1 time a week for 90 Day(s)         Subjective: See Evaluation Note dated 2022 for details   Pain:     Objective: See Evaluation Note dated 2022 for details      Therapeutic Exercise: (12min) Done in order to improve strength, ROM and understanding of current condition.     Date:   Date:   Date:   Date:     Activity/Exercise Parameters      Education Discussed examination findings, HEP, plan of care      NuStep       SLR 2x10      Hip ABD 2x10 B      Hip Extension 2x10 B                                                           Manual Therapy: (15min) Done in order to improve joint and soft tissue mobility,reduce muscle guarding, and decrease muscle tone   Date:   Date:   Date:   Date:     Type Parameters      Joint Mobilization Knee: P-A/distraction grades II-IV      Soft Tissue Mobilization           Modalities: (-) Done in order to reduce swelling and pain    Assessment: See Evaluation Note dated 2022 for details    Plan: See Evaluation Note dated 2022 for details    Future Appointments   Date Time Provider Bria Joya   2022  9:30 AM Garfield Brooms, PT [facility]   3/1/2022  9:30 AM Garfield Brooms, PT [facility]   3/8/2022  9:30 AM Garfield Brooms, PT [facility]   3/15/2022  9:30 AM Garfield Brooms, PT [facility]   3/22/2022  9:30 AM Garfield Brooms, PT [facility] 3/29/2022  9:30 AM [facility]ab[facility]   9/9/2022  9:00 AM Amrita Carlos [facility]   9/9/2022  9:30 AM [facility]       Unbilled Time: 18min eval  Units: 1 eval low/ 1MT/ 1TE      Francisco Blount, PT, DPT, OCS    Visit Approval Visit # Therapist initials Date A NS / Cx < 24 hr >24 hr Cx Comments    1 WJ 2/16 [x]  [] [] Initial evaluation       [] [] []        [] [] []        [] [] []        [] [] []        [] [] []        [] [] []        [] [] []        [] [] []        [] [] []        [] [] []        [] [] []        [] [] []        [] [] []        [] [] []        [] [] []        [] [] []        [] [] []

## 2022-02-16 NOTE — THERAPY EVALUATION
Onofre Benavides  : 1950  Primary: [place] [employer] Hmo  Secondary: [facility]  [address], [address]  [phone]   [phone]          OUTPATIENT PHYSICAL THERAPY:Initial Assessment 2022   ICD-10: Treatment Diagnosis: M25.562  Precautions/Allergies:   Allegra allergy [fexofenadine], Fexofenadine-pseudoephedrine, Ace inhibitors, and Zyrtec [cetirizine]   TREATMENT PLAN:  Effective Dates: 2022 TO 2022 (90 days). Frequency/Duration: 1 time a week for 90 Day(s) MEDICAL/REFERRING DIAGNOSIS:  Pain in Left Leg [M79.605]  DATE OF ONSET: Dec 2021  REFERRING PHYSICIAN: Mindy, Not On File, ISAAC CUETO Orders: Eval and treat  Return MD Appointment:      INITIAL ASSESSMENT:  Ms. Sanchez Cassidy presents to physical therapy with MD diagnosis of a L knee pain. Pt demonstrated signs and symptoms consistent with this diagnosis. On objective examination, the patient demonstrated deficits in ROM, strength, joint mobility, soft tissue mobility, functional ability, functional mobility and ambulatory ability. The patient also had increased pain, edema. The patient is limited in the following activities: walking, standing, transfers, stairs, ADLs, functional tasks. The patient has a good  prognosis for recovery based on the examination findings and may be limited by: N/A. Patient requires skilled physical therapy services in order to return to prior level of function. PROBLEM LIST (Impacting functional limitations):  1. Decreased Strength  2. Decreased ADL/Functional Activities  3. Decreased Transfer Abilities  4. Decreased Ambulation Ability/Technique  5. Decreased Balance  6. Increased Pain  7. Decreased Activity Tolerance  8. Decreased Flexibility/Joint Mobility  9. Edema/Girth  10. Decreased Knowledge of Precautions  11.  Decreased [place] with Home Exercise Program INTERVENTIONS PLANNED: (Treatment may consist of any combination of the following)  1. Balance Exercise  2. Cold  3. Heat  4. Home Exercise Program (HEP)  5. Manual Therapy  6. Neuromuscular Re-education/Strengthening  7. Range of Motion (ROM)  8. Therapeutic Activites  9. Therapeutic Exercise/Strengthening     GOALS: (Goals have been discussed and agreed upon with patient.)  Short-Term Functional Goals: Time Frame: 4 weeks  1. Pt will be compliant with progressive HEP  2. Pt will be able to do 10min of aerobic exercise without breaks or pain > 2/10  Discharge Goals: Time Frame: 8 weeks  1. Pt will increase LEFS score by 10pts  2. Pt will be able to walk > 30min with pain < 2/10    OUTCOME MEASURE:   Tool Used: Lower Extremity Functional Scale (LEFS)  Score:  Initial: 46/80 Most Recent: X/80 (Date: -- )   Interpretation of Score: 20 questions each scored on a 5 point scale with 0 representing \"extreme difficulty or unable to perform\" and 4 representing \"no difficulty\". The lower the score, the greater the functional disability. 80/80 represents no disability. Minimal detectable change is 9 points. MEDICAL NECESSITY:   · Patient is expected to demonstrate progress in strength, range of motion and symptom levels to return to full function. REASON FOR SERVICES/OTHER COMMENTS:  · Patient requires skilled physical therapy in order to return to prior level of function      Rehabilitation Potential For Stated Goals: Good  Regarding Arturo Sandoval's therapy, I certify that the treatment plan above will be carried out by a therapist or under their direction. Thank you for this referral,  Harley Aragon PT, DPT, OCS  Referring Physician Signature: Bsi, Not On File, ISAAC _______________________________ Date _____________     PAIN/SUBJECTIVE:   Initial:   2/10 Post Session:  1/10   HISTORY:   History of Injury/Illness (Reason for Referral):  Pt states that she started to have some pain in her L knee in Dec 2021.  Was on her feet a lot around the holidays and started to notice she was having increased pain in her L foot up her shin to her knee. Has been improving some, she was unable to walk for a few days, but that has improved. Pain is described as a dull, aching throbbing pain that will be sharp at times. Aggs: standing/ walking/ stairs/ initial standing after sitting or sleeping  Eases: medicine, heat  Past Medical History/Comorbidities:   Ms. Dandy White  has a past medical history of Arthritis, Breast cancer ([address] Chronically dry eyes, bilateral, CKD (chronic kidney disease), Colon polyps, Dyslipidemia, GERD (gastroesophageal reflux disease), Gout, Hiatal hernia, Hypertension, Liver disease, Phlebitis, Prediabetes, and Snores. Ms. Dandy White  has a past surgical history that includes hx hysterectomy (2003); hx cataract removal (Bilateral); hx bladder suspension; hx dilation and curettage; hx colonoscopy (2006); hx mastectomy (Bilateral, 8/20/2020); hx breast reconstruction (Bilateral, 8/20/2020); and hx breast reconstruction (Bilateral, 12/15/2020). Social History/Living Environment:     Lives with   Prior Level of Function/Work/Activity:  Taking care of grandkids  Walking 2mi  Personal Factors:          Sex:  female        Age:  66 y.o. Ambulatory/Rehab Services H2 Model Falls Risk Assessment   Risk Factors:       No Risk Factors Identified Ability to Rise from Chair:       (1)  Pushes up, successful in one attempt   Falls Prevention Plan:       No modifications necessary   Total: (5 or greater = High Risk): 1   ©2010 [facility] [employer]. All Rights Reserved. [facility] Patent #[id number]. Federal Law prohibits the replication, distribution or use without written permission from [facility] [employer]   Current Medications:       Current Outpatient Medications:     anastrozole (Arimidex) 1 mg tablet, Take 1 mg by mouth daily. , Disp: 90 Tablet, Rfl: 3    atorvastatin (LIPITOR) 40 mg tablet, Take 40 mg by mouth nightly., Disp: , Rfl:     NIFEdipine ER (PROCARDIA XL) 90 mg ER tablet, Take 90 mg by mouth daily. , Disp: , Rfl:     metoprolol succinate (TOPROL-XL) 50 mg XL tablet, Take 50 mg by mouth daily. , Disp: , Rfl:     omeprazole (PRILOSEC) 40 mg capsule, Take 40 mg by mouth daily. , Disp: , Rfl:     cholecalciferol (Vitamin D3) 25 mcg (1,000 unit) cap, Take 1,000 Units by mouth daily. , Disp: , Rfl:     polysorbate 80/glycerin (REFRESH DRY EYE THERAPY OP), Apply  to eye daily as needed. , Disp: , Rfl:     aspirin (ASPIRIN) 325 mg tablet, Take 81 mg by mouth daily. , Disp: , Rfl:    Date Last Reviewed:  22     Number of Personal Factors/Comorbidities that affect the Plan of Care: 1-2: MODERATE COMPLEXITY   EXAMINATION:   *= Painful     WNL= within normal limits     NT= not tested    Observation  B LE swelling (lymphedema)  Gait: slow, decreased L stance      ROM   Right Left   Flexion 120 115   Extension 0 0     Strength (all MMT scores are graded on a scale of 0-5)   Right Left   Hip      Flexion 5 5*   Abduction 4- 4-   Extension 4 4   Glute Max 4 4-   Knee     Extension 5 5   Flexion 5 5   Ankle     Dorsiflexion 5 5   Plantarflexion 5 5       Joint/Soft Tissue Mobility   Description   Joint Mobility  Knee: hypomobile, painful   Soft Tissue Mobility TTP along knee joint line       Functional Mobility  TU.8sec  30sec Sit to Stand: 7x (w/ hands)       Body Structures Involved:  1. Bones  2. Joints  3. Muscles  4. Ligaments Body Functions Affected:  1. Sensory/Pain  2. Neuromusculoskeletal  3. Movement Related Activities and Participation Affected:  1. General Tasks and Demands  2. Mobility  3. Self Care  4. Domestic Life  5. Interpersonal Interactions and Relationships  6.  Community, Social and [place]   Number of elements (examined above) that affect the Plan of Care: 3: MODERATE COMPLEXITY   CLINICAL PRESENTATION:   Presentation: Stable and uncomplicated: LOW COMPLEXITY   CLINICAL DECISION MAKING:   Use of outcome tool(s) and clinical judgement create a POC that gives a: Clear prediction of patient's progress: LOW COMPLEXITY     Baldev Ahn PT, DPT, OCS

## 2022-02-22 ENCOUNTER — HOSPITAL ENCOUNTER (OUTPATIENT)
Dept: PHYSICAL THERAPY | Age: 72
Discharge: HOME | End: 2022-02-22
Payer: MEDICARE

## 2022-02-22 PROCEDURE — 97110 THERAPEUTIC EXERCISES: CPT

## 2022-02-22 PROCEDURE — 97140 MANUAL THERAPY 1/> REGIONS: CPT

## 2022-02-22 NOTE — PROGRESS NOTES
Milton Temple  : 1950  Primary: [employer] Hmo  Secondary: [facility]  [address], [address]  [phone]   [phone]         OUTPATIENT PHYSICAL THERAPY:Daily Note 2022      TREATMENT:   PT Patient Time In/Time Out  Time In: 0930  Time Out: 1015      Total Time: 45min  Visit Count:  2     ICD-10: Treatment Diagnosis: M25.562  Medication Last Reviewed: 22      TREATMENT PLAN  Effective Dates: 2022 TO 2022 (90 days). Frequency/Duration: 1 time a week for 90 Day(s)         Subjective: Pt states she felt great after last session. Did a 25min walk over the weekend which made her a little sore   Pain: 2/10    Objective: Therapeutic Exercise: (12min) Done in order to improve strength, ROM and understanding of current condition.     Date:   Date:   Date:   Date:     Activity/Exercise Parameters      Education Discussed examination findings, HEP, plan of care      NuStep  x10min lvl 2     SLR 2x10 3x10     Hip ABD 2x10 B 3x10 B      Hip Extension 2x10 B 3x10 B     Bridges  3x10                                                   Manual Therapy: (15min) Done in order to improve joint and soft tissue mobility,reduce muscle guarding, and decrease muscle tone   Date:   Date:   Date:   Date:     Type Parameters      Joint Mobilization Knee: P-A/distraction grades II-IV Knee: P-A/distraction grades II-IV     Soft Tissue Mobilization           Modalities: (-) Done in order to reduce swelling and pain    Assessment: Pt tolerated session well but did have some fatigue following exercises    Plan: continue per POC    Future Appointments   Date Time Provider Bria Joya   3/1/2022  9:30 AM HERMILA Verdugo   3/8/2022  9:30 AM HERMILA Verdugo   3/15/2022  9:30 AM Yajaira Manriquez PT [facility]   3/22/2022  9:30 AM Gennaro Chicas Therese Mota, PT [facility]   3/29/2022  9:30 AM Lucy Barrett PT [facility]   9/9/2022  9:00 AM [place] [address] [facility]   9/9/2022  9:30 AM Antoine Sicard, MD [facility] Time:   Units: 1MT/ 2TE      uKsh Perez, PT, DPT, OCS    Visit Approval Visit # Therapist initials Date A NS / Cx < 24 hr >24 hr Cx Comments    1 WJ 2/16 [x]  [] [] Initial evaluation    2 WJ 2/22 [x] [] []        [] [] []        [] [] []        [] [] []        [] [] []        [] [] []        [] [] []        [] [] []        [] [] []        [] [] []        [] [] []        [] [] []        [] [] []        [] [] []        [] [] []        [] [] []        [] [] []

## 2022-03-01 ENCOUNTER — HOSPITAL ENCOUNTER (OUTPATIENT)
Dept: PHYSICAL THERAPY | Age: 72
Discharge: HOME | End: 2022-03-01
Payer: MEDICARE

## 2022-03-01 PROCEDURE — 97110 THERAPEUTIC EXERCISES: CPT

## 2022-03-01 PROCEDURE — 97140 MANUAL THERAPY 1/> REGIONS: CPT

## 2022-03-01 NOTE — PROGRESS NOTES
Milton Temple  : 1950  Primary: [employer] Hmo  Secondary: 401 [facility]  [address], [address]  [phone]   [phone]         OUTPATIENT PHYSICAL THERAPY:Daily Note 3/1/2022      TREATMENT:   PT Patient Time In/Time Out  Time In: 0930  Time Out: 1015      Total Time: 45min  Visit Count:  3     ICD-10: Treatment Diagnosis: M25.562  Medication Last Reviewed: 22      TREATMENT PLAN  Effective Dates: 2022 TO 2022 (90 days). Frequency/Duration: 1 time a week for 90 Day(s)         Subjective: Pt states she is doing ok today, 25min walk over the weekend   Pain: 2/10    Objective: Therapeutic Exercise: 302min) Done in order to improve strength, ROM and understanding of current condition.     Date:   Date:   Date:  3/1 Date:     Activity/Exercise Parameters      Education Discussed examination findings, HEP, plan of care      NuStep  x10min lvl 2 x10min lvl 4.5    SLR 2x10 3x10     Hip ABD 2x10 B 3x10 B  3x10 B 3lbs    Hip Extension 2x10 B 3x10 B 3x10 B 3lbs    Bridges  3x10     LAQ   3x10 B 3lbs    Sidestepping   3x1min 3lbs                                    Manual Therapy: (15min) Done in order to improve joint and soft tissue mobility,reduce muscle guarding, and decrease muscle tone   Date:   Date:   Date:  3/1 Date:     Type Parameters      Joint Mobilization Knee: P-A/distraction grades II-IV Knee: P-A/distraction grades II-IV Knee: P-A/distraction grades II-IV    Soft Tissue Mobilization           Modalities: (-) Done in order to reduce swelling and pain    Assessment: Pt had some muscle fatigue with added resistance to exercise, but no pain    Plan: continue per POC    Future Appointments   Date Time Provider Bria Joya   3/8/2022  9:30 AM HERMILA Verdugo   3/15/2022  9:30 AM HERMILA Verdugo   3/22/2022  9:30 AM Lico العراقي PT [facility]   3/29/2022  9:30 AM Lico العراقي PT [facility]   9/9/2022  9:00 AM Amrita Carlos [facility]   9/9/2022  9:30 AM Cindy Holcomb MD [facility]       James Rudd Time:   Units: 1MT/ 2TE      Jeanette Branham, PT, DPT, OCS    Visit Approval Visit # Therapist initials Date A NS / Cx < 24 hr >24 hr Cx Comments    1 WJ 2/16 [x]  [] [] Initial evaluation    2 WJ 2/22 [x] [] []     3 WJ 3/1 [x] [] []        [] [] []        [] [] []        [] [] []        [] [] []        [] [] []        [] [] []        [] [] []        [] [] []        [] [] []        [] [] []        [] [] []        [] [] []        [] [] []        [] [] []        [] [] []

## 2022-03-08 ENCOUNTER — HOSPITAL ENCOUNTER (OUTPATIENT)
Dept: PHYSICAL THERAPY | Age: 72
Discharge: HOME | End: 2022-03-08
Payer: MEDICARE

## 2022-03-08 PROCEDURE — 97110 THERAPEUTIC EXERCISES: CPT

## 2022-03-08 NOTE — PROGRESS NOTES
Tari Marti  : 1950  Primary: Jayden Kilgore [employer] Hmo  Secondary: 401 [facility] Therapy  [address], [address]  [phone]   [phone]         OUTPATIENT PHYSICAL THERAPY:Daily Note 3/8/2022      TREATMENT:   PT Patient Time In/Time Out  Time In: 30  Time Out: 1015      Total Time: 45min  Visit Count:  4     ICD-10: Treatment Diagnosis: M25.562  Medication Last Reviewed: 22      TREATMENT PLAN  Effective Dates: 2022 TO 2022 (90 days). Frequency/Duration: 1 time a week for 90 Day(s)         Subjective: Pt states she is feeling great, no pain since last visit   Pain: 0/10    Objective: Therapeutic Exercise: (45min) Done in order to improve strength, ROM and understanding of current condition.     Date:   Date:   Date:  3/1 Date:  3/8   Activity/Exercise Parameters      Education Discussed examination findings, HEP, plan of care      NuStep  x10min lvl 2 x10min lvl 4.5 x10min lvl 4   SLR 2x10 3x10  2x15 B   Hip ABD 2x10 B 3x10 B  3x10 B 3lbs 3x10 B 3lbs   Hip Extension 2x10 B 3x10 B 3x10 B 3lbs 3x10 B 3lbs   Bridges  3x10  3x10   LAQ   3x10 B 3lbs 3x10 B 3lbs   Sidestepping   3x1min 3lbs 3x1min 3lbs                                   Manual Therapy: (0min) Done in order to improve joint and soft tissue mobility,reduce muscle guarding, and decrease muscle tone   Date:   Date:   Date:  3/1 Date:     Type Parameters      Joint Mobilization Knee: P-A/distraction grades II-IV Knee: P-A/distraction grades II-IV Knee: P-A/distraction grades II-IV    Soft Tissue Mobilization           Modalities: (-) Done in order to reduce swelling and pain    Assessment: Pt tolerated increased exercise but did require some breaks due to fatigue    Plan: continue per POC    Future Appointments   Date Time Provider Bria Joya   3/15/2022  9:30 AM [facility]   3/22/2022  9:30 AM Ana M Bellrg, PT [facility]   3/29/2022  9:30 AM Ana M Pedro, PT [facility]   9/9/2022  9:00 AM Amrita [address] [facility]   9/9/2022  9:30 AM Zully Sigala MD [facility] Time:   Units: Morenita Dumont, PT, DPT, OCS    Visit Approval Visit # Therapist initials Date A NS / Cx < 24 hr >24 hr Cx Comments    1 WJ 2/16 [x]  [] [] Initial evaluation    2 WJ 2/22 [x] [] []     3 WJ 3/1 [x] [] []     4 WJ 3/8 [x] [] []        [] [] []        [] [] []        [] [] []        [] [] []        [] [] []        [] [] []        [] [] []        [] [] []        [] [] []        [] [] []        [] [] []        [] [] []        [] [] []        [] [] []

## 2022-03-15 ENCOUNTER — HOSPITAL ENCOUNTER (OUTPATIENT)
Dept: PHYSICAL THERAPY | Age: 72
Discharge: HOME | End: 2022-03-15
Payer: MEDICARE

## 2022-03-15 PROCEDURE — 97110 THERAPEUTIC EXERCISES: CPT

## 2022-03-15 NOTE — PROGRESS NOTES
[facility]  : 1950  Primary: [place] Dia Medicare Hmo  Secondary: 401 [facility] Therapy  [address], [address]  [phone]   [phone]         OUTPATIENT PHYSICAL THERAPY:Daily Note 3/15/2022      TREATMENT:   PT Patient Time In/Time Out  Time In: 0930  Time Out: 1015      Total Time: 45min  Visit Count:  5     ICD-10: Treatment Diagnosis: M25.562  Medication Last Reviewed: 03/15/22      TREATMENT PLAN  Effective Dates: 2022 TO 2022 (90 days). Frequency/Duration: 1 time a week for 90 Day(s)         Subjective: Pt states she is feeling good, no issues   Pain: 0/10    Objective: Therapeutic Exercise: (45min) Done in order to improve strength, ROM and understanding of current condition. Date:   Date:  3/1 Date:  3/8 Date:  3/15   Activity/Exercise       Education       NuStep x10min lvl 2 x10min lvl 4.5 x10min lvl 4 x10min lvl 4   SLR 3x10  2x15 B    Hip ABD 3x10 B  3x10 B 3lbs 3x10 B 3lbs 3x10 B 17.5lbs   Hip Extension 3x10 B 3x10 B 3lbs 3x10 B 3lbs 2x10 B 5lbs   Bridges 3x10  3x10    LAQ  3x10 B 3lbs 3x10 B 3lbs 3x10 B 5lbs   Sidestepping  3x1min 3lbs 3x1min 3lbs 3x1min 5lbs   Shuttle Press    · DL: 3x10 85lbs                            Manual Therapy: (0min) Done in order to improve joint and soft tissue mobility,reduce muscle guarding, and decrease muscle tone   Date:   Date:   Date:  3/1 Date:     Type Parameters      Joint Mobilization Knee: P-A/distraction grades II-IV Knee: P-A/distraction grades II-IV Knee: P-A/distraction grades II-IV    Soft Tissue Mobilization           Modalities: (-) Done in order to reduce swelling and pain    Assessment: Pt continues to tolerate sessions well, no pain.  Did have some fatigue with harder exercises today    Plan: continue per POC    Future Appointments   Date Time Provider Bria Joya   3/22/2022  9:30 AM Mina Abel PT [facility]   3/29/2022  9:30 AM Raynold Sandhoff, PT [facility]   9/9/2022  9:00 AM Amrita Carlos [facility]   9/9/2022  9:30 AM Robert Lopez MD [facility] [place] Time:   Units: Al [place], PT, DPT, OCS    Visit Approval Visit # Therapist initials Date A NS / Cx < 24 hr >24 hr Cx Comments    1 WJ 2/16 [x]  [] [] Initial evaluation    2 WJ 2/22 [x] [] []     3 WJ 3/1 [x] [] []     4 WJ 3/8 [x] [] []     5 WJ 3/15 [x] [] []        [] [] []        [] [] []        [] [] []        [] [] []        [] [] []        [] [] []        [] [] []        [] [] []        [] [] []        [] [] []        [] [] []        [] [] []        [] [] []

## 2022-03-18 PROBLEM — E66.01 OBESITY, MORBID (HCC): Status: ACTIVE | Noted: 2020-08-05

## 2022-03-19 PROBLEM — C50.911 MALIGNANT NEOPLASM OF RIGHT FEMALE BREAST (HCC): Status: ACTIVE | Noted: 2020-08-20

## 2022-03-19 PROBLEM — Z40.01 PROPHYLACTIC BREAST REMOVAL: Status: ACTIVE | Noted: 2020-08-20

## 2022-03-22 ENCOUNTER — HOSPITAL ENCOUNTER (OUTPATIENT)
Dept: PHYSICAL THERAPY | Age: 72
Discharge: HOME | End: 2022-03-22
Payer: MEDICARE

## 2022-03-22 PROCEDURE — 97110 THERAPEUTIC EXERCISES: CPT

## 2022-03-22 NOTE — PROGRESS NOTES
Aranza Pandya  : 1950  Primary: Dominga Dia Medicare Hmo  Secondary: 401 [facility] Therapy  [address], [address]  [phone]   [phone]         OUTPATIENT PHYSICAL THERAPY:Daily Note 3/22/2022      TREATMENT:   PT Patient Time In/Time Out  Time In: 0930  Time Out: 1015      Total Time: 45min  Visit Count:  6     ICD-10: Treatment Diagnosis: M25.562  Medication Last Reviewed: 22      TREATMENT PLAN  Effective Dates: 2022 TO 2022 (90 days). Frequency/Duration: 1 time a week for 90 Day(s)         Subjective: Pt states she is doing well, no pain   Pain: 0/10    Objective: Therapeutic Exercise: (45min) Done in order to improve strength, ROM and understanding of current condition.     Date:  3/1 Date:  3/8 Date:  3/15 Date:   3/22   Activity/Exercise       Education       NuStep x10min lvl 4.5 x10min lvl 4 x10min lvl 4 x10min lvl 4   SLR  2x15 B     Hip ABD 3x10 B 3lbs 3x10 B 3lbs 3x10 B 17.5lbs 3x10 B 17.5lbs   Hip Extension 3x10 B 3lbs 3x10 B 3lbs 2x10 B 5lbs    Bridges  3x10     LAQ 3x10 B 3lbs 3x10 B 3lbs 3x10 B 5lbs    Sidestepping 3x1min 3lbs 3x1min 3lbs 3x1min 5lbs 3x1min red TB   Shuttle Press   · DL: 3x10 85lbs · DL: 3x10 85lbs   Hip Flexion    3x10 B 17.5lbs   Sit to Stand    [id number]                                                 Manual Therapy: (0min) Done in order to improve joint and soft tissue mobility,reduce muscle guarding, and decrease muscle tone   Date:   Date:   Date:  3/1 Date:     Type Parameters      Joint Mobilization Knee: P-A/distraction grades II-IV Knee: P-A/distraction grades II-IV Knee: P-A/distraction grades II-IV    Soft Tissue Mobilization           Modalities: (-) Done in order to reduce swelling and pain    Assessment: Pt tolerated increases to exercise well, no pain    Plan: continue per POC    Future Appointments   Date Time Provider Bria Joya   3/29/2022 9:30 AM Adali Blanton PT [facility]   9/9/2022  9:00 AM Amrita 88 [facility]   9/9/2022  9:30 AM [facility]       Unbilled Time:   Units: Gloria Harden, PT, DPT, OCS    Visit Approval Visit # Therapist initials Date A NS / Cx < 24 hr >24 hr Cx Comments    1 WJ 2/16 [x]  [] [] Initial evaluation    2 WJ 2/22 [x] [] []     3 WJ 3/1 [x] [] []     4 WJ 3/8 [x] [] []     5 WJ 3/15 [x] [] []     6 WJ 3/22 [x] [] []        [] [] []        [] [] []        [] [] []        [] [] []        [] [] []        [] [] []        [] [] []        [] [] []        [] [] []        [] [] []        [] [] []        [] [] []

## 2022-03-29 ENCOUNTER — HOSPITAL ENCOUNTER (OUTPATIENT)
Dept: PHYSICAL THERAPY | Age: 72
Discharge: HOME | End: 2022-03-29
Payer: MEDICARE

## 2022-03-29 PROCEDURE — 97110 THERAPEUTIC EXERCISES: CPT

## 2022-03-29 NOTE — PROGRESS NOTES
Sohail Lola  : 1950  Primary: Bhumi Dia Medicare Hmo  Secondary: 401 [facility] Therapy  [address], [address]  [phone]   [phone]         OUTPATIENT PHYSICAL THERAPY:Daily Note 3/29/2022      TREATMENT:   PT Patient Time In/Time Out  Time In: 0930  Time Out: 1015      Total Time: 45min  Visit Count:  7     ICD-10: Treatment Diagnosis: M25.562  Medication Last Reviewed: 22      TREATMENT PLAN  Effective Dates: 2022 TO 2022 (90 days). Frequency/Duration: 1 time a week for 90 Day(s)         Subjective: Pt states she is doing very well, has not been having any issues   Pain: 0/10    Objective: Therapeutic Exercise: (45min) Done in order to improve strength, ROM and understanding of current condition.     Date:  3/8 Date:  3/15 Date:   3/22 Date:  3/29   Activity/Exercise       Education       NuStep x10min lvl 4 x10min lvl 4 x10min lvl 4 x10min lvl 4   SLR 2x15 B      Hip ABD 3x10 B 3lbs 3x10 B 17.5lbs 3x10 B 17.5lbs    Hip Extension 3x10 B 3lbs 2x10 B 5lbs     Bridges 3x10      LAQ 3x10 B 3lbs 3x10 B 5lbs  3x10 B 5lbs   Sidestepping 3x1min 3lbs 3x1min 5lbs 3x1min red TB 3x1min green TB   Shuttle Press  · DL: 3x10 85lbs · DL: 3x10 85lbs · DL: 3x10 100lbs  · B SL: 3x10 B 65lbs   Hip Flexion   3x10 B 17.5lbs    Sit to Stand   [id number]                                                 Manual Therapy: (0min) Done in order to improve joint and soft tissue mobility,reduce muscle guarding, and decrease muscle tone   Date:   Date:   Date:  3/1 Date:     Type Parameters      Joint Mobilization Knee: P-A/distraction grades II-IV Knee: P-A/distraction grades II-IV Knee: P-A/distraction grades II-IV    Soft Tissue Mobilization           Modalities: (-) Done in order to reduce swelling and pain    Assessment: Pt continues to tolerate sessions well, no pain with increased exercise    Plan: continue per POC    Future Appointments   Date Time Provider Bria Mahnaz   4/5/2022  9:30 AM Jessi Irving, PT [facility]   4/8/2022  9:30 AM Jessi Irving, PT [facility]   9/9/2022  9:00 AM Amrita Carlos [facility]   9/9/2022  9:30 AM Ines Garcia MD [facility] Time:   Units: [facility], PT, DPT, OCS    Visit Approval Visit # Therapist initials Date A NS / Cx < 24 hr >24 hr Cx Comments    1 WJ 2/16 [x]  [] [] Initial evaluation    2 WJ 2/22 [x] [] []     3 WJ 3/1 [x] [] []     4 WJ 3/8 [x] [] []     5 WJ 3/15 [x] [] []     6 WJ 3/22 [x] [] []     7 WJ 3/29 [x] [] []        [] [] []        [] [] []        [] [] []        [] [] []        [] [] []        [] [] []        [] [] []        [] [] []        [] [] []        [] [] []        [] [] []

## 2022-04-05 ENCOUNTER — HOSPITAL ENCOUNTER (OUTPATIENT)
Dept: PHYSICAL THERAPY | Age: 72
Discharge: HOME | End: 2022-04-05
Payer: MEDICARE

## 2022-04-05 PROCEDURE — 97110 THERAPEUTIC EXERCISES: CPT

## 2022-04-05 NOTE — PROGRESS NOTES
Dominique Nice  : 1950  Primary: Ryland Dia Medicare Hmo  Secondary: [facility] Therapy  [address], [address]  [phone]   [phone]         OUTPATIENT PHYSICAL THERAPY:Daily Note 2022      TREATMENT:   PT Patient Time In/Time Out  Time In: 0930  Time Out: 1015      Total Time: 45min  Visit Count:  8     ICD-10: Treatment Diagnosis: M25.562  Medication Last Reviewed: 22      TREATMENT PLAN  Effective Dates: 2022 TO 2022 (90 days). Frequency/Duration: 1 time a week for 90 Day(s)         Subjective: Pt states she is doing great, did a longer walk with weekend and had no issues   Pain: 0/10    Objective: Therapeutic Exercise: (45min) Done in order to improve strength, ROM and understanding of current condition.     Date:  3/15 Date:   3/22 Date:  3/29 Date:     Activity/Exercise       Education       NuStep x10min lvl 4 x10min lvl 4 x10min lvl 4 x10min lvl 4   SLR       Hip ABD 3x10 B 17.5lbs 3x10 B 17.5lbs     Hip Extension 2x10 B 5lbs      Bridges       LAQ 3x10 B 5lbs  3x10 B 5lbs    Sidestepping 3x1min 5lbs 3x1min red TB 3x1min green TB    Shuttle Press · DL: 3x10 85lbs · DL: 3x10 85lbs · DL: 3x10 100lbs  · B SL: 3x10 B 65lbs · DL: 3x10 115lbs  · B SL: 3x10 115lbs   Hip Flexion  3x10 B 17.5lbs     Sit to Stand  [id number]                                                  Manual Therapy: (0min) Done in order to improve joint and soft tissue mobility,reduce muscle guarding, and decrease muscle tone   Date:   Date:   Date:  3/1 Date:     Type Parameters      Joint Mobilization Knee: P-A/distraction grades II-IV Knee: P-A/distraction grades II-IV Knee: P-A/distraction grades II-IV    Soft Tissue Mobilization           Modalities: (-) Done in order to reduce swelling and pain    Assessment: Pt continues to progress well, will be ready for discharge at next session    Plan: continue per POC    Future Appointments   Date Time Provider Bria Joya   4/12/2022  9:30 AM Dory Brizuela PT [facility]   9/9/2022  9:00 AM Amrita Carlos [facility]   9/9/2022  9:30 AM Yelitza Banda MD [facility] Time:   Units: Rexann Olinda, PT, DPT, OCS    Visit Approval Visit # Therapist initials Date A NS / Cx < 24 hr >24 hr Cx Comments    1 WJ 2/16 [x]  [] [] Initial evaluation    2 WJ 2/22 [x] [] []     3 WJ 3/1 [x] [] []     4 WJ 3/8 [x] [] []     5 WJ 3/15 [x] [] []     6 WJ 3/22 [x] [] []     7 WJ 3/29 [x] [] []     8 WJ 4/5 [x] [] []        [] [] []        [] [] []        [] [] []        [] [] []        [] [] []        [] [] []        [] [] []        [] [] []        [] [] []        [] [] []

## 2022-04-08 ENCOUNTER — APPOINTMENT (OUTPATIENT)
Dept: PHYSICAL THERAPY | Age: 72
End: 2022-04-08
Payer: MEDICARE

## 2022-04-12 ENCOUNTER — HOSPITAL ENCOUNTER (OUTPATIENT)
Dept: PHYSICAL THERAPY | Age: 72
Discharge: HOME | End: 2022-04-12
Payer: MEDICARE

## 2022-04-12 PROCEDURE — 97110 THERAPEUTIC EXERCISES: CPT

## 2022-04-12 NOTE — THERAPY DISCHARGE
Doen Choudhary  : 1950  Primary: Chad Dia Medicare Hmo  Secondary: 401 [facility] Therapy  [address], [address]  [phone]   [id number] [id number]          OUTPATIENT PHYSICAL THERAPY:Discharge Assessment 2022   ICD-10: Treatment Diagnosis: M25.562  Precautions/Allergies:   Allegra allergy [fexofenadine], Fexofenadine-pseudoephedrine, Ace inhibitors, and Zyrtec [cetirizine]   TREATMENT PLAN:  Effective Dates: 2022 TO 2022 (90 days). Frequency/Duration: 1 time a week for 90 Day(s) MEDICAL/REFERRING DIAGNOSIS:  Pain in Left Leg [M79.605]  DATE OF ONSET: Dec 2021  REFERRING PHYSICIAN: Mima Hinds DO MD Orders: Karsten Verduzco and treat  Return MD Appointment:      INITIAL ASSESSMENT:  Ms. Arley Huang presents to physical therapy with MD diagnosis of a L knee pain. Pt demonstrated signs and symptoms consistent with this diagnosis. On objective examination, the patient demonstrated deficits in ROM, strength, joint mobility, soft tissue mobility, functional ability, functional mobility and ambulatory ability. The patient also had increased pain, edema. The patient is limited in the following activities: walking, standing, transfers, stairs, ADLs, functional tasks. The patient has a good  prognosis for recovery based on the examination findings and may be limited by: N/A. Patient requires skilled physical therapy services in order to return to prior level of function. DISCHARGE ASSESSMENT: Mrs. Arley Huang presented to physical therapy with MD diagnosis of a L knee pain. Pt demonstrated signs and symptoms consistent with this diagnosis. On objective examination, the patient demonstrated improvements in ROM, strength, joint mobility, soft tissue mobility, functional ability/mobility, ambulatory ability.  The patient also has decreased pain, edema These improvements have increased the patient's ability to: walk, stand, transfer, stairs, ADLs, functional tasks. The patient is still limited in the following activities: none. The patient is being discharged from therapy at this time due to meeting goals and being independent with HEP        PROBLEM LIST (Impacting functional limitations):  1. INTERVENTIONS PLANNED: (Treatment may consist of any combination of the following)  1. Balance Exercise  2. Cold  3. Heat  4. Home Exercise Program (HEP)  5. Manual Therapy  6. Neuromuscular Re-education/Strengthening  7. Range of Motion (ROM)  8. Therapeutic Activites  9. Therapeutic Exercise/Strengthening     GOALS: (Goals have been discussed and agreed upon with patient.)  Short-Term Functional Goals: Time Frame: 4 weeks  1. Pt will be compliant with progressive HEP-- goal met  2. Pt will be able to do 10min of aerobic exercise without breaks or pain > 2/10-- goal met  Discharge Goals: Time Frame: 8 weeks  1. Pt will increase LEFS score by 10pts-- goal met  2. Pt will be able to walk > 30min with pain < 2/10-- goal met    OUTCOME MEASURE:   Tool Used: Lower Extremity Functional Scale (LEFS)  Score:  Initial: 46/80 Most Recent: 79/80 (Date: 4/12/2022 )   Interpretation of Score: 20 questions each scored on a 5 point scale with 0 representing \"extreme difficulty or unable to perform\" and 4 representing \"no difficulty\". The lower the score, the greater the functional disability. 80/80 represents no disability. Minimal detectable change is 9 points. MEDICAL NECESSITY:   · Patient is being discharged from therapy at this time      Rehabilitation Potential For Stated Goals: Good  Regarding Arturo Sandoval's therapy, I certify that the treatment plan above will be carried out by a therapist or under their direction.   Thank you for this referral,  Ryder Kirby PT, DPT, OCS  Referring Physician Signature: Rachel Jacob DO _______________________________ Date _____________     PAIN/SUBJECTIVE:   Initial:   2/10 Post Session:  1/10   HISTORY:   History of Injury/Illness (Reason for Referral):  Pt states that she started to have some pain in her L knee in Dec 2021. Was on her feet a lot around the holidays and started to notice she was having increased pain in her L foot up her shin to her knee. Has been improving some, she was unable to walk for a few days, but that has improved. Pain is described as a dull, aching throbbing pain that will be sharp at times. Aggs: standing/ walking/ stairs/ initial standing after sitting or sleeping  Eases: medicine, heat  Past Medical History/Comorbidities:   Ms. Ellen Harvey  has a past medical history of Arthritis, Breast cancer ([facility] [address] Chronically dry eyes, bilateral, CKD (chronic kidney disease), Colon polyps, Dyslipidemia, GERD (gastroesophageal reflux disease), Gout, Hiatal hernia, Hypertension, Liver disease, Phlebitis, Prediabetes, and Snores. Ms. Ellen Harvey  has a past surgical history that includes hx hysterectomy (2003); hx cataract removal (Bilateral); hx bladder suspension; hx dilation and curettage; hx colonoscopy (2006); hx mastectomy (Bilateral, 8/20/2020); hx breast reconstruction (Bilateral, 8/20/2020); and hx breast reconstruction (Bilateral, 12/15/2020). Social History/Living Environment:     Lives with   Prior Level of Function/Work/Activity:  Taking care of grandkids  Walking 2mi  Personal Factors:          Sex:  female        Age:  66 y.o. Ambulatory/Rehab Services H2 Model Falls Risk Assessment   Risk Factors:       No Risk Factors Identified Ability to Rise from Chair:       (1)  Pushes up, successful in one attempt   Falls Prevention Plan:       No modifications necessary   Total: (5 or greater = High Risk): 1   ©2010 [facility] [employer]. All Rights Reserved. [facility] States Patent #[id number].  Federal Law prohibits the replication, distribution or use without written permission from [employer]   Current Medications:       Current Outpatient Medications:     anastrozole (Arimidex) 1 mg tablet, Take 1 mg by mouth daily. , Disp: 90 Tablet, Rfl: 3    atorvastatin (LIPITOR) 40 mg tablet, Take 40 mg by mouth nightly., Disp: , Rfl:     NIFEdipine ER (PROCARDIA XL) 90 mg ER tablet, Take 90 mg by mouth daily. , Disp: , Rfl:     metoprolol succinate (TOPROL-XL) 50 mg XL tablet, Take 50 mg by mouth daily. , Disp: , Rfl:     omeprazole (PRILOSEC) 40 mg capsule, Take 40 mg by mouth daily. , Disp: , Rfl:     cholecalciferol (Vitamin D3) 25 mcg (1,000 unit) cap, Take 1,000 Units by mouth daily. , Disp: , Rfl:     polysorbate 80/glycerin (REFRESH DRY EYE THERAPY OP), Apply  to eye daily as needed. , Disp: , Rfl:     aspirin (ASPIRIN) 325 mg tablet, Take 81 mg by mouth daily. , Disp: , Rfl:    Date Last Reviewed:  22     Number of Personal Factors/Comorbidities that affect the Plan of Care: 1-2: MODERATE COMPLEXITY   EXAMINATION:   *= Painful     WNL= within normal limits     NT= not tested    Observation  B LE swelling (lymphedema)  Gait: improved, good speed      ROM   Right Left   Flexion 120 120   Extension 0 0     Strength (all MMT scores are graded on a scale of 0-5)   Right Left   Hip      Flexion 5 5   Abduction 4 4   Extension 4+ 4+   Glute Max 4+ 4+   Knee     Extension 5 5   Flexion 5 5   Ankle     Dorsiflexion 5 5   Plantarflexion 5 5       Joint/Soft Tissue Mobility   Description   Joint Mobility  Knee: normal mobility, no pain   Soft Tissue Mobility No TTP       Functional Mobility  TU.9sec  30sec Sit to Stand: 13x (w/o hands)       Body Structures Involved:  1. Bones  2. Joints  3. Muscles  4. Ligaments Body Functions Affected:  1. Sensory/Pain  2. Neuromusculoskeletal  3. Movement Related Activities and Participation Affected:  1. General Tasks and Demands  2. Mobility  3. Self Care  4. Domestic Life  5. Interpersonal Interactions and Relationships  6.  Community, Social and [place]   Number of elements (examined above) that affect the Plan of Care: 3: MODERATE COMPLEXITY   CLINICAL PRESENTATION:   Presentation: Stable and uncomplicated: LOW COMPLEXITY   CLINICAL DECISION MAKING:   Use of outcome tool(s) and clinical judgement create a POC that gives a: Clear prediction of patient's progress: LOW COMPLEXITY     Wendy Giraldo PT, DPT, OCS

## 2022-04-21 DIAGNOSIS — C50.911 MALIGNANT NEOPLASM OF RIGHT BREAST IN FEMALE, ESTROGEN RECEPTOR POSITIVE, UNSPECIFIED SITE OF BREAST (HCC): Primary | ICD-10-CM

## 2022-04-21 DIAGNOSIS — Z17.0 MALIGNANT NEOPLASM OF RIGHT BREAST IN FEMALE, ESTROGEN RECEPTOR POSITIVE, UNSPECIFIED SITE OF BREAST (HCC): Primary | ICD-10-CM

## 2022-07-26 ENCOUNTER — HOSPITAL ENCOUNTER (OUTPATIENT)
Dept: PHYSICAL THERAPY | Age: 72
Setting detail: RECURRING SERIES
Discharge: HOME | End: 2022-07-29
Payer: COMMERCIAL

## 2022-07-26 PROCEDURE — 97161 PT EVAL LOW COMPLEX 20 MIN: CPT

## 2022-07-26 PROCEDURE — 97110 THERAPEUTIC EXERCISES: CPT

## 2022-07-26 PROCEDURE — 97140 MANUAL THERAPY 1/> REGIONS: CPT

## 2022-07-26 ASSESSMENT — PAIN SCALES - GENERAL: PAINLEVEL_OUTOF10: 2

## 2022-07-26 NOTE — PROGRESS NOTES
Jhonvena Medicine  : 1950  Primary: Liset Kimbrough  Secondary:  [address] @ [facility] Therapy  [address] [place] [address] [facility] [address]  Phone: [phone]  Fax: [phone] Plan Frequency: 1x/week for 90 days    Plan of Care/Certification Expiration Date: 10/24/22      PT Visit Info:  No data recorded    OUTPATIENT PHYSICAL THERAPY:OP NOTE TYPE: Treatment Note 2022       Episode  }Appt Desk             Treatment Diagnosis:  Pain in Left Knee (M25.562)  Stiffness of Left Knee, Not elsewhere classified (B74.379)  Medical/Referring Diagnosis:  No admission diagnoses are documented for this encounter. Referring Physician:  Feng Dominguez DO MD Orders:  PT Eval and Treat   Date of Onset:  No data recorded   Allergies:   Fexofenadine, Fexofenadine-pseudoephed er, Ace inhibitors, and Cetirizine  Restrictions/Precautions:  No data recordedNo data recorded   Interventions Planned (Treatment may consist of any combination of the following):    Current Treatment Recommendations: Strengthening; ROM; Balance training; Functional mobility training; Transfer training; ADL/Self-care training; Endurance training; Stair training; Neuromuscular re-education; Manual Therapy - Soft Tissue Mobilization; Manual Therapy - Joint Manipulation; Pain management; Home exercise program; Safety education & training; Patient/Caregiver education & training; Modalities; Therapeutic activities     Subjective Comments:     Initial:}    2/10Post Session:       2/10  Medications Last Reviewed:  2022  Updated Objective Findings:  See evaluation note from today  Treatment   THERAPEUTIC EXERCISE: (15 minutes):    Exercises per grid below to improve mobility, strength, and balance. Required minimal visual, verbal, manual, and tactile cues to promote proper body posture and promote proper body mechanics. Progressed resistance, range, repetitions, and complexity of movement as indicated.      Date:   Date: Date:     Activity/Exercise Parameters Parameters Parameters   Education Plan of car, prognosis, anatomy, HEP     NuStep x5min     Quad Set x5min     Ankle Pumps x30                           MANUAL THERAPY: (15 minutes):   Joint mobilization and Soft tissue mobilization was utilized and necessary because of the patient's restricted joint motion, painful spasm, loss of articular motion, and restricted motion of soft tissue. Date:  6/27 Date:   Date:     Activity/Exercise Parameters Parameters Parameters   Soft Tissue Anterior shin     Joint Mobilization      Dry Needling            Treatment/Session Summary:    Treatment Assessment:     Communication/Consultation:   HEP  Equipment provided today:  None  Recommendations/Intent for next treatment session: Next visit will focus on progression as tolerated.     Total Treatment Billable Duration:  45 minutes  Time In: 1430  Time Out: 2057 [facility], PT       Charge Capture  }Post Session Pain  PT Visit Info  MedBridge Portal  MD Guidelines  Scanned Media  Benefits  MyChart    Future Appointments   Date Time Provider Ivan Bojorquez   7/27/2022  8:45 AM Jori Lopez PT [facility]   9/9/2022  9:00 AM Mitzy Zarco [facility]   9/9/2022  9:30 AM Rosalie Domínguez MD [facility] AM[facility]

## 2022-07-27 ENCOUNTER — HOSPITAL ENCOUNTER (OUTPATIENT)
Dept: PHYSICAL THERAPY | Age: 72
Setting detail: RECURRING SERIES
Discharge: HOME | End: 2022-07-30
Payer: COMMERCIAL

## 2022-07-27 PROCEDURE — 97110 THERAPEUTIC EXERCISES: CPT

## 2022-07-27 PROCEDURE — 97140 MANUAL THERAPY 1/> REGIONS: CPT

## 2022-07-27 ASSESSMENT — PAIN SCALES - GENERAL: PAINLEVEL_OUTOF10: 1

## 2022-07-27 NOTE — PROGRESS NOTES
Tulio Charles  : 1950  Primary: Alli Hudson  Secondary:  [address] @  Therapy  [address] [facility] [place] [address]  Phone: [phone]  Fax: [phone] Plan Frequency: 1x/week for 90 days    Plan of Care/Certification Expiration Date: 10/24/22      PT Visit Info:  No data recorded    OUTPATIENT PHYSICAL THERAPY:OP NOTE TYPE: Treatment Note 2022       Episode  }Appt Desk             Treatment Diagnosis:  Pain in Left Knee (M25.562)  Stiffness of Left Knee, Not elsewhere classified (T91.041)  Medical/Referring Diagnosis:  No admission diagnoses are documented for this encounter. Referring Physician:  Mira Palacios DO MD Orders:  PT Eval and Treat   Date of Onset:  No data recorded   Allergies:   Fexofenadine, Fexofenadine-pseudoephed er, Ace inhibitors, and Cetirizine  Restrictions/Precautions:  No data recordedNo data recorded   Interventions Planned (Treatment may consist of any combination of the following):    Current Treatment Recommendations: Strengthening; ROM; Balance training; Functional mobility training; Transfer training; ADL/Self-care training; Endurance training; Stair training; Neuromuscular re-education; Manual Therapy - Soft Tissue Mobilization; Manual Therapy - Joint Manipulation; Pain management; Home exercise program; Safety education & training; Patient/Caregiver education & training; Modalities; Therapeutic activities     Subjective Comments:  Pt states she is feeling better today  Initial:}    1/10Post Session:       1/10  Medications Last Reviewed:  2022  Updated Objective Findings:  See evaluation note from today  Treatment   THERAPEUTIC EXERCISE: (30 minutes):    Exercises per grid below to improve mobility, strength, and balance. Required minimal visual, verbal, manual, and tactile cues to promote proper body posture and promote proper body mechanics.   Progressed resistance, range, repetitions, and complexity of movement as indicated. Date:  7/26 Date:  7/27 Date:     Activity/Exercise Parameters Parameters Parameters   Education Plan of car, prognosis, anatomy, HEP     NuStep x5min x10min    Quad Set x5min x5min    Ankle Pumps x30 x30    Bridges  3x10    LAQ  3x10    Walking  x500ft        MANUAL THERAPY: (15 minutes):   Joint mobilization and Soft tissue mobilization was utilized and necessary because of the patient's restricted joint motion, painful spasm, loss of articular motion, and restricted motion of soft tissue. Date:  7/26 Date:  7/27 Date:     Activity/Exercise Parameters Parameters Parameters   Soft Tissue Anterior shin Anterior shin    Joint Mobilization      Dry Needling            Treatment/Session Summary:    Treatment Assessment:  Pt tolerated session well, no pain with increased exercise  Communication/Consultation:   HEP  Equipment provided today:  None  Recommendations/Intent for next treatment session: Next visit will focus on progression as tolerated.     Total Treatment Billable Duration:  45 minutes  Time In: 9254  Time Out: 1150 [facility], PT       Charge Capture  }Post Session Pain  PT Visit Info  [employer] Portal  MD Guidelines  Scanned Media  Benefits  MyChart    Future Appointments   Date Time Provider Ivan Ayoni   9/9/2022  9:00 AM Mitzy 88 [facility]   9/9/2022  9:30 AM Mary Raya MD [facility]

## 2022-09-01 ENCOUNTER — CLINICAL DOCUMENTATION (OUTPATIENT)
Dept: ONCOLOGY | Age: 72
End: 2022-09-01

## 2022-09-12 ENCOUNTER — TELEPHONE (OUTPATIENT)
Dept: ONCOLOGY | Age: 72
End: 2022-09-12

## 2023-02-07 LAB
AVERAGE GLUCOSE: ABNORMAL
HBA1C MFR BLD: 6.4 %

## 2023-03-21 ENCOUNTER — OFFICE VISIT (OUTPATIENT)
Dept: ONCOLOGY | Age: 73
End: 2023-03-21
Payer: MEDICARE

## 2023-03-21 ENCOUNTER — HOSPITAL ENCOUNTER (OUTPATIENT)
Dept: LAB | Age: 73
Discharge: HOME | End: 2023-03-24
Payer: MEDICARE

## 2023-03-21 VITALS
SYSTOLIC BLOOD PRESSURE: 140 MMHG | BODY MASS INDEX: 45.2 KG/M2 | DIASTOLIC BLOOD PRESSURE: 92 MMHG | WEIGHT: 255.1 LBS | TEMPERATURE: 98 F | HEIGHT: 63 IN | OXYGEN SATURATION: 98 % | HEART RATE: 78 BPM | RESPIRATION RATE: 16 BRPM

## 2023-03-21 DIAGNOSIS — Z17.0 MALIGNANT NEOPLASM OF RIGHT BREAST IN FEMALE, ESTROGEN RECEPTOR POSITIVE, UNSPECIFIED SITE OF BREAST (HCC): Primary | ICD-10-CM

## 2023-03-21 DIAGNOSIS — C50.911 MALIGNANT NEOPLASM OF RIGHT BREAST IN FEMALE, ESTROGEN RECEPTOR POSITIVE, UNSPECIFIED SITE OF BREAST (HCC): Primary | ICD-10-CM

## 2023-03-21 DIAGNOSIS — Z79.811 LONG TERM CURRENT USE OF AROMATASE INHIBITOR: ICD-10-CM

## 2023-03-21 DIAGNOSIS — Z17.0 MALIGNANT NEOPLASM OF RIGHT BREAST IN FEMALE, ESTROGEN RECEPTOR POSITIVE, UNSPECIFIED SITE OF BREAST (HCC): ICD-10-CM

## 2023-03-21 DIAGNOSIS — C50.911 MALIGNANT NEOPLASM OF RIGHT BREAST IN FEMALE, ESTROGEN RECEPTOR POSITIVE, UNSPECIFIED SITE OF BREAST (HCC): ICD-10-CM

## 2023-03-21 LAB
ALBUMIN SERPL-MCNC: 3.5 G/DL (ref 3.2–4.6)
ALBUMIN/GLOB SERPL: 0.8 (ref 0.4–1.6)
ALP SERPL-CCNC: 119 U/L (ref 50–136)
ALT SERPL-CCNC: 25 U/L (ref 12–65)
ANION GAP SERPL CALC-SCNC: 3 MMOL/L (ref 2–11)
AST SERPL-CCNC: 17 U/L (ref 15–37)
BASOPHILS # BLD: 0 K/UL (ref 0–0.2)
BASOPHILS NFR BLD: 1 % (ref 0–2)
BILIRUB SERPL-MCNC: 0.3 MG/DL (ref 0.2–1.1)
BUN SERPL-MCNC: 19 MG/DL (ref 8–23)
CALCIUM SERPL-MCNC: 9.6 MG/DL (ref 8.3–10.4)
CHLORIDE SERPL-SCNC: 110 MMOL/L (ref 101–110)
CO2 SERPL-SCNC: 27 MMOL/L (ref 21–32)
CREAT SERPL-MCNC: 1.1 MG/DL (ref 0.6–1)
DIFFERENTIAL METHOD BLD: NORMAL
EOSINOPHIL # BLD: 0.2 K/UL (ref 0–0.8)
EOSINOPHIL NFR BLD: 4 % (ref 0.5–7.8)
ERYTHROCYTE [DISTWIDTH] IN BLOOD BY AUTOMATED COUNT: 12.7 % (ref 11.9–14.6)
GLOBULIN SER CALC-MCNC: 4.3 G/DL (ref 2.8–4.5)
GLUCOSE SERPL-MCNC: 166 MG/DL (ref 65–100)
HCT VFR BLD AUTO: 45.6 % (ref 35.8–46.3)
HGB BLD-MCNC: 14.7 G/DL (ref 11.7–15.4)
IMM GRANULOCYTES # BLD AUTO: 0 K/UL (ref 0–0.5)
IMM GRANULOCYTES NFR BLD AUTO: 0 % (ref 0–5)
LYMPHOCYTES # BLD: 1.7 K/UL (ref 0.5–4.6)
LYMPHOCYTES NFR BLD: 35 % (ref 13–44)
MCH RBC QN AUTO: 30.1 PG (ref 26.1–32.9)
MCHC RBC AUTO-ENTMCNC: 32.2 G/DL (ref 31.4–35)
MCV RBC AUTO: 93.4 FL (ref 82–102)
MONOCYTES # BLD: 0.3 K/UL (ref 0.1–1.3)
MONOCYTES NFR BLD: 6 % (ref 4–12)
NEUTS SEG # BLD: 2.6 K/UL (ref 1.7–8.2)
NEUTS SEG NFR BLD: 54 % (ref 43–78)
NRBC # BLD: 0 K/UL (ref 0–0.2)
PLATELET # BLD AUTO: 241 K/UL (ref 150–450)
PMV BLD AUTO: 10.4 FL (ref 9.4–12.3)
POTASSIUM SERPL-SCNC: 3.8 MMOL/L (ref 3.5–5.1)
PROT SERPL-MCNC: 7.8 G/DL (ref 6.3–8.2)
RBC # BLD AUTO: 4.88 M/UL (ref 4.05–5.2)
SODIUM SERPL-SCNC: 140 MMOL/L (ref 133–143)
WBC # BLD AUTO: 4.9 K/UL (ref 4.3–11.1)

## 2023-03-21 PROCEDURE — 36415 COLL VENOUS BLD VENIPUNCTURE: CPT

## 2023-03-21 PROCEDURE — 1123F ACP DISCUSS/DSCN MKR DOCD: CPT | Performed by: INTERNAL MEDICINE

## 2023-03-21 PROCEDURE — 80053 COMPREHEN METABOLIC PANEL: CPT

## 2023-03-21 PROCEDURE — 99214 OFFICE O/P EST MOD 30 MIN: CPT | Performed by: INTERNAL MEDICINE

## 2023-03-21 PROCEDURE — 85025 COMPLETE CBC W/AUTO DIFF WBC: CPT

## 2023-03-21 RX ORDER — EMPAGLIFLOZIN 10 MG/1
TABLET, FILM COATED ORAL
COMMUNITY
Start: 2023-03-07

## 2023-03-21 RX ORDER — ANASTROZOLE 1 MG/1
1 TABLET ORAL DAILY
Qty: 90 TABLET | Refills: 3 | Status: SHIPPED | OUTPATIENT
Start: 2023-03-21

## 2023-03-21 ASSESSMENT — PATIENT HEALTH QUESTIONNAIRE - PHQ9
SUM OF ALL RESPONSES TO PHQ9 QUESTIONS 1 & 2: 0
SUM OF ALL RESPONSES TO PHQ QUESTIONS 1-9: 0
1. LITTLE INTEREST OR PLEASURE IN DOING THINGS: 0
2. FEELING DOWN, DEPRESSED OR HOPELESS: 0

## 2023-03-21 NOTE — PATIENT INSTRUCTIONS
- 100 mg/dL Final    BUN 03/21/2023 19  8 - 23 MG/DL Final    Creatinine 03/21/2023 1.10 (A)  0.6 - 1.0 MG/DL Final    Est, Glom Filt Rate 03/21/2023 53 (A)  >60 ml/min/1.73m2 Final    Comment:      Pediatric calculator link: Adan.at. org/professionals/kdoqi/gfr_calculatorped       These results are not intended for use in patients <25years of age. eGFR results are calculated without a race factor using  the 2021 CKD-EPI equation. Careful clinical correlation is recommended, particularly when comparing to results calculated using previous equations. The CKD-EPI equation is less accurate in patients with extremes of muscle mass, extra-renal metabolism of creatinine, excessive creatine ingestion, or following therapy that affects renal tubular secretion. Calcium 03/21/2023 9.6  8.3 - 10.4 MG/DL Final    Total Bilirubin 03/21/2023 0.3  0.2 - 1.1 MG/DL Final    ALT 03/21/2023 25  12 - 65 U/L Final    AST 03/21/2023 17  15 - 37 U/L Final    Alk Phosphatase 03/21/2023 119  50 - 136 U/L Final    Total Protein 03/21/2023 7.8  6.3 - 8.2 g/dL Final    Albumin 03/21/2023 3.5  3.2 - 4.6 g/dL Final    Globulin 03/21/2023 4.3  2.8 - 4.5 g/dL Final    Albumin/Globulin Ratio 03/21/2023 0.8  0.4 - 1.6   Final     Treatment Summary has been discussed and given to patient: N/A    -------------------------------------------------------------------------------------------------------------------  Please call our office at [phone] if you have any  of the following symptoms:   Fever of 100.5 or greater  Chills  Shortness of breath  Swelling or pain in one leg    After office hours an answering service is available and will contact a provider for emergencies or if you are experiencing any of the above symptoms. Patient does express an interest in My Chart. My Chart log in information explained on the after visit summary printout at the [facility] desk.     Jada Fox RN

## 2023-03-21 NOTE — PROGRESS NOTES
COLONOSCOPY  2006    DILATION AND CURETTAGE OF UTERUS      HYSTERECTOMY (CERVIX STATUS UNKNOWN)  2003    MASTECTOMY Bilateral 8/20/2020    SIMPLE BILATERAL BREAST MASTECTOMY performed by Jazmyn Dubois DO at [address] [address] LOC DEVICE 1ST LESION RIGHT Right 7/27/2020     BREAST NEEDLE BIOPSY [facility] AMB HISTORICAL     Family History   Problem Relation Age of Onset    Cancer Father         throat cancer    Cancer Sister         Pancreatic cancer    Cancer Sister         Breast cancer    Cancer Brother         leukemia     Cancer Brother         lung cancer    Cancer Mother     Heart Disease Father      Social History     Socioeconomic History    Marital status:      Spouse name: Not on file    Number of children: Not on file    Years of education: Not on file    Highest education level: Not on file   Occupational History    Not on file   Tobacco Use    Smoking status: Never    Smokeless tobacco: Never   Substance and Sexual Activity    Alcohol use: Not Currently    Drug use: Not Currently    Sexual activity: Not on file   Other Topics Concern    Not on file   Social History Narrative    Not on file     Social Determinants of Health     Financial Resource Strain: Not on file   Food Insecurity: Not on file   Transportation Needs: Not on file   Physical Activity: Not on file   Stress: Not on file   Social Connections: Not on file   Intimate Partner Violence: Not on file   Housing Stability: Not on file     Current Outpatient Medications   Medication Sig Dispense Refill    anastrozole (ARIMIDEX) 1 MG tablet Take 1 tablet by mouth daily 90 tablet 3    aspirin 325 MG tablet Take 81 mg by mouth daily      atorvastatin (LIPITOR) 40 MG tablet Take 40 mg by mouth      vitamin D 25 MCG (1000 UT) CAPS Take 1,000 Units by mouth daily      metoprolol succinate (TOPROL XL) 50 MG extended release tablet Take 50 mg by mouth daily      NIFEdipine (PROCARDIA XL) 90 MG extended release tablet

## 2023-03-24 ENCOUNTER — HOSPITAL ENCOUNTER (OUTPATIENT)
Dept: MAMMOGRAPHY | Age: 73
End: 2023-03-24
Payer: MEDICARE

## 2023-03-24 DIAGNOSIS — Z79.811 LONG TERM CURRENT USE OF AROMATASE INHIBITOR: ICD-10-CM

## 2023-03-24 PROCEDURE — 77080 DXA BONE DENSITY AXIAL: CPT

## 2023-04-20 ENCOUNTER — HOSPITAL ENCOUNTER (OUTPATIENT)
Dept: PHYSICAL THERAPY | Age: 73
Setting detail: RECURRING SERIES
Discharge: HOME | End: 2023-04-23
Payer: MEDICARE

## 2023-04-20 PROCEDURE — 97140 MANUAL THERAPY 1/> REGIONS: CPT

## 2023-04-20 PROCEDURE — 97166 OT EVAL MOD COMPLEX 45 MIN: CPT

## 2023-04-20 ASSESSMENT — PAIN SCALES - GENERAL: PAINLEVEL_OUTOF10: 6

## 2023-04-25 ENCOUNTER — HOSPITAL ENCOUNTER (OUTPATIENT)
Dept: PHYSICAL THERAPY | Age: 73
Setting detail: RECURRING SERIES
Discharge: HOME | End: 2023-04-28
Payer: MEDICARE

## 2023-04-25 PROCEDURE — 97140 MANUAL THERAPY 1/> REGIONS: CPT

## 2023-04-25 PROCEDURE — 97535 SELF CARE MNGMENT TRAINING: CPT

## 2023-04-25 ASSESSMENT — PAIN SCALES - GENERAL: PAINLEVEL_OUTOF10: 6

## 2023-04-25 NOTE — PROGRESS NOTES
Honey Mealing  : 1950  Primary: Tima Cedillo [employer]mo (Medicare Managed)  Secondary: [address] @ [facility] Therapy  [address] [facility] [address]  Phone: [phone]  Fax: [phone]    >OT Visit Info:   Plan Frequency: 1x/week up to 90 days  Total # of Visits to Date: 2     Visit Count: Visit count could not be calculated. Make sure you are using a visit which is associated with an episode. OUTPATIENT OCCUPATIONAL THERAPY: Treatment Note 2023  Episode: (BLE lymphedema)   Appt Desk      Medical/Referring Diagnosis:  Treatment Diagnosis:  Difficulty in walking, Not elsewhere classified (R26.2)  Localized edema (R60.1)  Lymphedema, not elsewhere classified (I89.0)  Q82.0 hereditary or primary lymphedema   Referring Physician:  Brendon Garza MD MD Orders:  OT Eval and Treat   Date of Onset:  chronic, >40 years  Allergies:  Fexofenadine, Fexofenadine-pseudoephed er, Ace inhibitors, and Cetirizine  Restrictions/Precautions:    No data recorded  No data recorded  Medications Last Reviewed:  2023     Interventions Planned: (Treatment may consist of any combination of the following)  MLD, skin care, compression bandaging, compression garment fitting, therapeutic exercises, pt/caregiver education, home program establishment   >Subjective Comments:\"Lymphapress sent me a form to fill out for the pump\". >Initial:     6/10 >Post Session:     5/10  Medications Last Reviewed:  2023  Updated Objective Findings:  None Today  Treatment   THERAPEUTIC EXERCISE: ( minutes): MANUAL THERAPY: (45 minutes):   Manual lymphatic drainage was utilized and necessary because of the patient's  exacerbation of LE lymphedema .    Manual Lymph Drainage:   Lymph Nodes:    Cervical Supraclavicular Axillary Abdominal Inguinal Popliteal Antecubital   RIGHT []     [x]     [x]     [x]     [x]     [x]     []       LEFT []     [x]     [x]     [x]     [x]     [x]     []

## 2023-05-03 ENCOUNTER — HOSPITAL ENCOUNTER (OUTPATIENT)
Dept: PHYSICAL THERAPY | Age: 73
Setting detail: RECURRING SERIES
Discharge: HOME | End: 2023-05-06
Payer: MEDICARE

## 2023-05-03 PROCEDURE — 97140 MANUAL THERAPY 1/> REGIONS: CPT

## 2023-05-03 ASSESSMENT — PAIN SCALES - GENERAL: PAINLEVEL_OUTOF10: 4

## 2023-05-03 NOTE — PROGRESS NOTES
Fredis Henderson  : 1950  Primary: [facility] (Medicare Managed)  Secondary: [address] @ [facility]  [address] [facility] [address]  Phone: [phone]  Fax: [phone]    >OT Visit Info:   Plan Frequency: 1x/week up to 90 days  Total # of Visits to Date: 3     Visit Count: Visit count could not be calculated. Make sure you are using a visit which is associated with an episode. OUTPATIENT OCCUPATIONAL THERAPY: Treatment Note 5/3/2023  Episode: (BLE lymphedema)   Appt Desk      Medical/Referring Diagnosis:  Treatment Diagnosis:  Difficulty in walking, Not elsewhere classified (R26.2)  Localized edema (R60.1)  Lymphedema, not elsewhere classified (I89.0)  Q82.0 hereditary or primary lymphedema   Referring Physician:  Frances Gabriel MD MD Orders:  OT Eval and Treat   Date of Onset:  chronic, >40 years  Allergies:  Fexofenadine, Fexofenadine-pseudoephed er, Ace inhibitors, and Cetirizine  Restrictions/Precautions:    No data recorded  No data recorded  Medications Last Reviewed:  5/3/2023     Interventions Planned: (Treatment may consist of any combination of the following)  MLD, skin care, compression bandaging, compression garment fitting, therapeutic exercises, pt/caregiver education, home program establishment   >Subjective Comments:\"[facility] came to my house to meet with me about the pump\". >Initial:     4/10 >Post Session:     4/10  Medications Last Reviewed:  5/3/2023  Updated Objective Findings:  None Today  Treatment   THERAPEUTIC EXERCISE: ( minutes): MANUAL THERAPY: (55 minutes):   Manual lymphatic drainage was utilized and necessary because of the patient's  exacerbation of LE lymphedema .    Manual Lymph Drainage:   Lymph Nodes:    Cervical Supraclavicular Axillary Abdominal Inguinal Popliteal Antecubital   RIGHT []     [x]     [x]     [x]     [x]     [x]     []       LEFT []     [x]     [x]     [x]     [x]

## 2023-05-10 ENCOUNTER — HOSPITAL ENCOUNTER (OUTPATIENT)
Dept: PHYSICAL THERAPY | Age: 73
Setting detail: RECURRING SERIES
Discharge: HOME | End: 2023-05-13
Payer: MEDICARE

## 2023-05-10 PROCEDURE — 97140 MANUAL THERAPY 1/> REGIONS: CPT

## 2023-05-10 ASSESSMENT — PAIN SCALES - GENERAL: PAINLEVEL_OUTOF10: 4

## 2023-05-10 NOTE — PROGRESS NOTES
Thang Palma  : 1950  Primary: [facility] (Medicare Managed)  Secondary: [address] @ [facility]  [address] [facility] [place] [address]  Phone: [phone]  Fax: [phone]    >OT Visit Info:   Plan Frequency: 1x/week up to 90 days  Total # of Visits to Date: 4     Visit Count: Visit count could not be calculated. Make sure you are using a visit which is associated with an episode. OUTPATIENT OCCUPATIONAL THERAPY: Treatment Note 5/10/2023  Episode: (BLE lymphedema)   Appt Desk      Medical/Referring Diagnosis:  Treatment Diagnosis:  Difficulty in walking, Not elsewhere classified (R26.2)  Localized edema (R60.1)  Lymphedema, not elsewhere classified (I89.0)  Q82.0 hereditary or primary lymphedema   Referring Physician:  Sharyn Mei MD MD Orders:  OT Eval and Treat   Date of Onset:  chronic, >40 years  Allergies:  Fexofenadine, Fexofenadine-pseudoephed er, Ace inhibitors, and Cetirizine  Restrictions/Precautions:    No data recorded  No data recorded  Medications Last Reviewed:  5/10/2023     Interventions Planned: (Treatment may consist of any combination of the following)  MLD, skin care, compression bandaging, compression garment fitting, therapeutic exercises, pt/caregiver education, home program establishment   >Subjective Comments: \"My legs feel so much better after therapy\". >Initial:     4/10 >Post Session:     2/10  Medications Last Reviewed:  5/10/2023  Updated Objective Findings:  None Today  Treatment   THERAPEUTIC EXERCISE: ( minutes): MANUAL THERAPY: (55 minutes):   Manual lymphatic drainage was utilized and necessary because of the patient's  exacerbation of LE lymphedema  with instruction on deep breathing and self MLD.    Manual Lymph Drainage:   Lymph Nodes:    Cervical Supraclavicular Axillary Abdominal Inguinal Popliteal Antecubital   RIGHT []     [x]     [x]     [x]     [x]     [x]     []       LEFT []

## 2023-05-16 ENCOUNTER — APPOINTMENT (OUTPATIENT)
Dept: PHYSICAL THERAPY | Age: 73
End: 2023-05-16
Payer: MEDICARE

## 2023-05-22 ENCOUNTER — HOSPITAL ENCOUNTER (OUTPATIENT)
Dept: PHYSICAL THERAPY | Age: 73
Setting detail: RECURRING SERIES
Discharge: HOME | End: 2023-05-25
Payer: MEDICARE

## 2023-05-22 PROCEDURE — 97140 MANUAL THERAPY 1/> REGIONS: CPT

## 2023-05-22 ASSESSMENT — PAIN SCALES - GENERAL: PAINLEVEL_OUTOF10: 3

## 2023-05-22 NOTE — PROGRESS NOTES
[x]     [x]     []       LEFT []     [x]     [x]     [x]     [x]     [x]     []         Anastamoses:   Axillo-axillary Inguino-inguinal Axillo-inguinal Inguino-axillary   ANTERIOR []     []     []     [x]       POSTERIOR []     []     []     []       RIGHT []     []     []     [x]       LEFT []     []     []     [x]         Limbs:   []    RUE     []    LUE     [x]    RLE    [x]    LLE   SELF CARE: (5 minutes):   Velcro wraps donned after skin care and MLD. Pt is able to manage compression independently and owns both velcro compression wraps and class 1 compression knee highs. Compression pump was set up in her home by [facility] over the weekend. Treatment/Session Summary:    >Treatment Assessment:Pt tolerated treatment session well with no medical complications. LE pain decreased by 3 grades following MLD. She is independent with compression management and exercises. Home program education today on skin care,self MLD, exercises, elevation, precautions. She will transition to compression and home program and decrease therapy frequency to as needed. If home program is successful she will be discharged from OT. Communication/Consultation:  None today  Equipment provided today:  None  Recommendations/Intent for next treatment session: Next visit will focus on reduction phase of CDT. Transition to home program. Prepare for discharge.      >Total Treatment Billable Duration:  60minutes  OT Individual Minutes  Time In: 0900  Time Out: 1000  Minutes: 61    Zulema Stoddard, OTR/L, CLT    Post Session Pain  Charge Capture   POC Link  Treatment Note Link  MD Guidelines  MyChart    Future Appointments   Date Time Provider Ivan Bojorquez   9/21/2023  1:30 PM Mitzy [address] [facility]   9/21/2023  2:00 PM JUAREZ Stovall NP [facility]   3/22/2024 10:10 AM [facility]   3/22/2024 10:45 AM Nehemiah Sawant MD [facility]

## 2023-09-19 DIAGNOSIS — Z17.0 MALIGNANT NEOPLASM OF RIGHT BREAST IN FEMALE, ESTROGEN RECEPTOR POSITIVE, UNSPECIFIED SITE OF BREAST (HCC): Primary | ICD-10-CM

## 2023-09-19 DIAGNOSIS — C50.911 MALIGNANT NEOPLASM OF RIGHT BREAST IN FEMALE, ESTROGEN RECEPTOR POSITIVE, UNSPECIFIED SITE OF BREAST (HCC): Primary | ICD-10-CM

## 2023-09-21 ENCOUNTER — HOSPITAL ENCOUNTER (OUTPATIENT)
Dept: LAB | Age: 73
Discharge: HOME | End: 2023-09-21
Payer: MEDICARE

## 2023-09-21 ENCOUNTER — OFFICE VISIT (OUTPATIENT)
Dept: ONCOLOGY | Age: 73
End: 2023-09-21
Payer: MEDICARE

## 2023-09-21 VITALS
SYSTOLIC BLOOD PRESSURE: 164 MMHG | DIASTOLIC BLOOD PRESSURE: 83 MMHG | OXYGEN SATURATION: 96 % | RESPIRATION RATE: 16 BRPM | WEIGHT: 251 LBS | HEART RATE: 70 BPM | BODY MASS INDEX: 44.46 KG/M2 | TEMPERATURE: 98.7 F

## 2023-09-21 DIAGNOSIS — C50.911 MALIGNANT NEOPLASM OF RIGHT BREAST IN FEMALE, ESTROGEN RECEPTOR POSITIVE, UNSPECIFIED SITE OF BREAST (HCC): Primary | ICD-10-CM

## 2023-09-21 DIAGNOSIS — Z17.0 MALIGNANT NEOPLASM OF RIGHT BREAST IN FEMALE, ESTROGEN RECEPTOR POSITIVE, UNSPECIFIED SITE OF BREAST (HCC): ICD-10-CM

## 2023-09-21 DIAGNOSIS — Z79.811 LONG TERM CURRENT USE OF AROMATASE INHIBITOR: ICD-10-CM

## 2023-09-21 DIAGNOSIS — Z17.0 MALIGNANT NEOPLASM OF RIGHT BREAST IN FEMALE, ESTROGEN RECEPTOR POSITIVE, UNSPECIFIED SITE OF BREAST (HCC): Primary | ICD-10-CM

## 2023-09-21 DIAGNOSIS — C50.911 MALIGNANT NEOPLASM OF RIGHT BREAST IN FEMALE, ESTROGEN RECEPTOR POSITIVE, UNSPECIFIED SITE OF BREAST (HCC): ICD-10-CM

## 2023-09-21 LAB
ALBUMIN SERPL-MCNC: 3.6 G/DL (ref 3.2–4.6)
ALBUMIN/GLOB SERPL: 0.8 (ref 0.4–1.6)
ALP SERPL-CCNC: 118 U/L (ref 50–136)
ALT SERPL-CCNC: 25 U/L (ref 12–65)
ANION GAP SERPL CALC-SCNC: 5 MMOL/L (ref 2–11)
AST SERPL-CCNC: 16 U/L (ref 15–37)
BASOPHILS # BLD: 0 K/UL (ref 0–0.2)
BASOPHILS NFR BLD: 1 % (ref 0–2)
BILIRUB SERPL-MCNC: 0.3 MG/DL (ref 0.2–1.1)
BUN SERPL-MCNC: 17 MG/DL (ref 8–23)
CALCIUM SERPL-MCNC: 9.1 MG/DL (ref 8.3–10.4)
CHLORIDE SERPL-SCNC: 110 MMOL/L (ref 101–110)
CO2 SERPL-SCNC: 25 MMOL/L (ref 21–32)
CREAT SERPL-MCNC: 1.2 MG/DL (ref 0.6–1)
DIFFERENTIAL METHOD BLD: ABNORMAL
EOSINOPHIL # BLD: 0.2 K/UL (ref 0–0.8)
EOSINOPHIL NFR BLD: 3 % (ref 0.5–7.8)
ERYTHROCYTE [DISTWIDTH] IN BLOOD BY AUTOMATED COUNT: 13.2 % (ref 11.9–14.6)
GLOBULIN SER CALC-MCNC: 4.6 G/DL (ref 2.8–4.5)
GLUCOSE SERPL-MCNC: 94 MG/DL (ref 65–100)
HCT VFR BLD AUTO: 51.9 % (ref 35.8–46.3)
HGB BLD-MCNC: 16.2 G/DL (ref 11.7–15.4)
IMM GRANULOCYTES # BLD AUTO: 0 K/UL (ref 0–0.5)
IMM GRANULOCYTES NFR BLD AUTO: 0 % (ref 0–5)
LYMPHOCYTES # BLD: 2.2 K/UL (ref 0.5–4.6)
LYMPHOCYTES NFR BLD: 40 % (ref 13–44)
MCH RBC QN AUTO: 29.4 PG (ref 26.1–32.9)
MCHC RBC AUTO-ENTMCNC: 31.2 G/DL (ref 31.4–35)
MCV RBC AUTO: 94.2 FL (ref 82–102)
MONOCYTES # BLD: 0.5 K/UL (ref 0.1–1.3)
MONOCYTES NFR BLD: 8 % (ref 4–12)
NEUTS SEG # BLD: 2.7 K/UL (ref 1.7–8.2)
NEUTS SEG NFR BLD: 48 % (ref 43–78)
NRBC # BLD: 0 K/UL (ref 0–0.2)
PLATELET # BLD AUTO: 246 K/UL (ref 150–450)
PMV BLD AUTO: 10 FL (ref 9.4–12.3)
POTASSIUM SERPL-SCNC: 4 MMOL/L (ref 3.5–5.1)
PROT SERPL-MCNC: 8.2 G/DL (ref 6.3–8.2)
RBC # BLD AUTO: 5.51 M/UL (ref 4.05–5.2)
SODIUM SERPL-SCNC: 140 MMOL/L (ref 133–143)
WBC # BLD AUTO: 5.6 K/UL (ref 4.3–11.1)

## 2023-09-21 PROCEDURE — 1090F PRES/ABSN URINE INCON ASSESS: CPT | Performed by: NURSE PRACTITIONER

## 2023-09-21 PROCEDURE — 36415 COLL VENOUS BLD VENIPUNCTURE: CPT

## 2023-09-21 PROCEDURE — 80053 COMPREHEN METABOLIC PANEL: CPT

## 2023-09-21 PROCEDURE — 3017F COLORECTAL CA SCREEN DOC REV: CPT | Performed by: NURSE PRACTITIONER

## 2023-09-21 PROCEDURE — 1123F ACP DISCUSS/DSCN MKR DOCD: CPT | Performed by: NURSE PRACTITIONER

## 2023-09-21 PROCEDURE — 1036F TOBACCO NON-USER: CPT | Performed by: NURSE PRACTITIONER

## 2023-09-21 PROCEDURE — G8417 CALC BMI ABV UP PARAM F/U: HCPCS | Performed by: NURSE PRACTITIONER

## 2023-09-21 PROCEDURE — 99214 OFFICE O/P EST MOD 30 MIN: CPT | Performed by: NURSE PRACTITIONER

## 2023-09-21 PROCEDURE — G8427 DOCREV CUR MEDS BY ELIG CLIN: HCPCS | Performed by: NURSE PRACTITIONER

## 2023-09-21 PROCEDURE — G8399 PT W/DXA RESULTS DOCUMENT: HCPCS | Performed by: NURSE PRACTITIONER

## 2023-09-21 PROCEDURE — 85025 COMPLETE CBC W/AUTO DIFF WBC: CPT

## 2023-09-21 ASSESSMENT — PATIENT HEALTH QUESTIONNAIRE - PHQ9
SUM OF ALL RESPONSES TO PHQ QUESTIONS 1-9: 0
SUM OF ALL RESPONSES TO PHQ QUESTIONS 1-9: 0
10. IF YOU CHECKED OFF ANY PROBLEMS, HOW DIFFICULT HAVE THESE PROBLEMS MADE IT FOR YOU TO DO YOUR WORK, TAKE CARE OF THINGS AT HOME, OR GET ALONG WITH OTHER PEOPLE: 0
5. POOR APPETITE OR OVEREATING: 0
9. THOUGHTS THAT YOU WOULD BE BETTER OFF DEAD, OR OF HURTING YOURSELF: 0
SUM OF ALL RESPONSES TO PHQ9 QUESTIONS 1 & 2: 0
SUM OF ALL RESPONSES TO PHQ QUESTIONS 1-9: 0
1. LITTLE INTEREST OR PLEASURE IN DOING THINGS: 0
SUM OF ALL RESPONSES TO PHQ QUESTIONS 1-9: 0
2. FEELING DOWN, DEPRESSED OR HOPELESS: 0
3. TROUBLE FALLING OR STAYING ASLEEP: 0
4. FEELING TIRED OR HAVING LITTLE ENERGY: 0
6. FEELING BAD ABOUT YOURSELF - OR THAT YOU ARE A FAILURE OR HAVE LET YOURSELF OR YOUR FAMILY DOWN: 0
7. TROUBLE CONCENTRATING ON THINGS, SUCH AS READING THE NEWSPAPER OR WATCHING TELEVISION: 0
8. MOVING OR SPEAKING SO SLOWLY THAT OTHER PEOPLE COULD HAVE NOTICED. OR THE OPPOSITE, BEING SO FIGETY OR RESTLESS THAT YOU HAVE BEEN MOVING AROUND A LOT MORE THAN USUAL: 0

## 2023-09-21 ASSESSMENT — ANXIETY QUESTIONNAIRES
IF YOU CHECKED OFF ANY PROBLEMS ON THIS QUESTIONNAIRE, HOW DIFFICULT HAVE THESE PROBLEMS MADE IT FOR YOU TO DO YOUR WORK, TAKE CARE OF THINGS AT HOME, OR GET ALONG WITH OTHER PEOPLE: NOT DIFFICULT AT ALL
GAD7 TOTAL SCORE: 0
7. FEELING AFRAID AS IF SOMETHING AWFUL MIGHT HAPPEN: 0
4. TROUBLE RELAXING: 0
3. WORRYING TOO MUCH ABOUT DIFFERENT THINGS: 0
1. FEELING NERVOUS, ANXIOUS, OR ON EDGE: 0
6. BECOMING EASILY ANNOYED OR IRRITABLE: 0
2. NOT BEING ABLE TO STOP OR CONTROL WORRYING: 0
5. BEING SO RESTLESS THAT IT IS HARD TO SIT STILL: 0

## 2023-09-21 NOTE — PROGRESS NOTES
[facility] Hematology and Oncology: Office Visit Established Patient    Chief Complaint:    Chief Complaint   Patient presents with    Follow-up       History of Present Illness:  Ms. Sridevi Hitchcock is a 67 y.o. female who returns today for management of breast cancer. Ms. Sridevi Hitchcock underwent  a routine mammogram screening at [place] on 7/14/2020 which showed a new nodularity in the right upper retroareolar region. She then underwent diagnostic mammogram which showed an irregular spiculated mass of the right breast measuring 1.1 x 0.7  x 1.1 cm. On 7/27/2020, patient underwent an ultrasound guided core needle biopsy of the right breast mass. This biopsy revealed invasive breast cancer, low grade, ER 95%, NV 32%, HER2 negative. On 7/31/2020, Ms. Sridevi Hitchcock completed breast MRI with  results showing 1.2 cm x 1 cm x 0.8 cm enhancing mass at the 11:00 position of the right breast located 3 cm from the nipple which was consistent with the patient's known right breast cancer. However, there was asymmetric anterior right breast skin thickening  which demonstrated additional enhancement. On 8/20/2020, she had a bilateral mastectomy with both a deep right axillary dissection and right sentinel lymph node biopsy. She had Dr. Kaden Faith preform breast reconstruction surgery at the same date of her  bilateral mastectomy. Final surgical pathology showed 1.4 cm of tumor with negative margins, sentinel node negative, left breast was benign. She was referred to oncology for recommendations. Oncotype was low risk at 14, so  chemotherapy could be safely deferred. She is post-menopausal, so a prescription was given for anastrozole. She is here today for follow up on Arimidex. She is tolerating the Arimidex well. In the interim, she saw GI and was treated for diverticulitis with ABX, no current pain or issues with this. She states her GERD is controlled as long as she avoids triggers. She has no side effects r/t the Arimidex.   She

## 2023-11-10 RX ORDER — ANASTROZOLE 1 MG/1
TABLET ORAL
Qty: 90 TABLET | OUTPATIENT
Start: 2023-11-10

## 2024-03-20 DIAGNOSIS — Z17.0 MALIGNANT NEOPLASM OF RIGHT BREAST IN FEMALE, ESTROGEN RECEPTOR POSITIVE, UNSPECIFIED SITE OF BREAST (HCC): Primary | ICD-10-CM

## 2024-03-20 DIAGNOSIS — C50.911 MALIGNANT NEOPLASM OF RIGHT BREAST IN FEMALE, ESTROGEN RECEPTOR POSITIVE, UNSPECIFIED SITE OF BREAST (HCC): Primary | ICD-10-CM

## 2024-03-20 RX ORDER — ANASTROZOLE 1 MG/1
1 TABLET ORAL DAILY
Qty: 90 TABLET | Refills: 3 | OUTPATIENT
Start: 2024-03-20

## 2024-03-22 ENCOUNTER — OFFICE VISIT (OUTPATIENT)
Dept: ONCOLOGY | Age: 74
End: 2024-03-22
Payer: MEDICARE

## 2024-03-22 ENCOUNTER — HOSPITAL ENCOUNTER (OUTPATIENT)
Dept: LAB | Age: 74
Discharge: HOME | End: 2024-03-22
Payer: OTHER GOVERNMENT

## 2024-03-22 VITALS
WEIGHT: 249.8 LBS | OXYGEN SATURATION: 96 % | DIASTOLIC BLOOD PRESSURE: 77 MMHG | SYSTOLIC BLOOD PRESSURE: 133 MMHG | HEART RATE: 72 BPM | TEMPERATURE: 98.6 F | HEIGHT: 63 IN | RESPIRATION RATE: 22 BRPM | BODY MASS INDEX: 44.26 KG/M2

## 2024-03-22 DIAGNOSIS — Z79.811 LONG TERM CURRENT USE OF AROMATASE INHIBITOR: ICD-10-CM

## 2024-03-22 DIAGNOSIS — C50.911 MALIGNANT NEOPLASM OF RIGHT BREAST IN FEMALE, ESTROGEN RECEPTOR POSITIVE, UNSPECIFIED SITE OF BREAST (HCC): Primary | ICD-10-CM

## 2024-03-22 DIAGNOSIS — C50.911 MALIGNANT NEOPLASM OF RIGHT BREAST IN FEMALE, ESTROGEN RECEPTOR POSITIVE, UNSPECIFIED SITE OF BREAST (HCC): ICD-10-CM

## 2024-03-22 DIAGNOSIS — Z17.0 MALIGNANT NEOPLASM OF RIGHT BREAST IN FEMALE, ESTROGEN RECEPTOR POSITIVE, UNSPECIFIED SITE OF BREAST (HCC): ICD-10-CM

## 2024-03-22 DIAGNOSIS — Z17.0 MALIGNANT NEOPLASM OF RIGHT BREAST IN FEMALE, ESTROGEN RECEPTOR POSITIVE, UNSPECIFIED SITE OF BREAST (HCC): Primary | ICD-10-CM

## 2024-03-22 LAB
ALBUMIN SERPL-MCNC: 3.7 G/DL (ref 3.2–4.6)
ALBUMIN/GLOB SERPL: 0.8 (ref 0.4–1.6)
ALP SERPL-CCNC: 119 U/L (ref 50–136)
ALT SERPL-CCNC: 23 U/L (ref 12–65)
ANION GAP SERPL CALC-SCNC: 7 MMOL/L (ref 2–11)
AST SERPL-CCNC: 18 U/L (ref 15–37)
BASOPHILS # BLD: 0.1 K/UL (ref 0–0.2)
BASOPHILS NFR BLD: 1 % (ref 0–2)
BILIRUB SERPL-MCNC: 0.5 MG/DL (ref 0.2–1.1)
BUN SERPL-MCNC: 19 MG/DL (ref 8–23)
CALCIUM SERPL-MCNC: 9.6 MG/DL (ref 8.3–10.4)
CHLORIDE SERPL-SCNC: 107 MMOL/L (ref 103–113)
CO2 SERPL-SCNC: 27 MMOL/L (ref 21–32)
CREAT SERPL-MCNC: 1.3 MG/DL (ref 0.6–1)
DIFFERENTIAL METHOD BLD: ABNORMAL
EOSINOPHIL # BLD: 0.2 K/UL (ref 0–0.8)
EOSINOPHIL NFR BLD: 3 % (ref 0.5–7.8)
ERYTHROCYTE [DISTWIDTH] IN BLOOD BY AUTOMATED COUNT: 12.8 % (ref 11.9–14.6)
GLOBULIN SER CALC-MCNC: 4.8 G/DL (ref 2.8–4.5)
GLUCOSE SERPL-MCNC: 116 MG/DL (ref 65–100)
HCT VFR BLD AUTO: 48.2 % (ref 35.8–46.3)
HGB BLD-MCNC: 15.7 G/DL (ref 11.7–15.4)
IMM GRANULOCYTES # BLD AUTO: 0 K/UL (ref 0–0.5)
IMM GRANULOCYTES NFR BLD AUTO: 0 % (ref 0–5)
LYMPHOCYTES # BLD: 2.1 K/UL (ref 0.5–4.6)
LYMPHOCYTES NFR BLD: 41 % (ref 13–44)
MCH RBC QN AUTO: 30.1 PG (ref 26.1–32.9)
MCHC RBC AUTO-ENTMCNC: 32.6 G/DL (ref 31.4–35)
MCV RBC AUTO: 92.5 FL (ref 82–102)
MONOCYTES # BLD: 0.3 K/UL (ref 0.1–1.3)
MONOCYTES NFR BLD: 7 % (ref 4–12)
NEUTS SEG # BLD: 2.6 K/UL (ref 1.7–8.2)
NEUTS SEG NFR BLD: 49 % (ref 43–78)
NRBC # BLD: 0 K/UL (ref 0–0.2)
PLATELET # BLD AUTO: 250 K/UL (ref 150–450)
PMV BLD AUTO: 10.3 FL (ref 9.4–12.3)
POTASSIUM SERPL-SCNC: 3.8 MMOL/L (ref 3.5–5.1)
PROT SERPL-MCNC: 8.5 G/DL (ref 6.3–8.2)
RBC # BLD AUTO: 5.21 M/UL (ref 4.05–5.2)
SODIUM SERPL-SCNC: 141 MMOL/L (ref 136–146)
WBC # BLD AUTO: 5.3 K/UL (ref 4.3–11.1)

## 2024-03-22 PROCEDURE — 99214 OFFICE O/P EST MOD 30 MIN: CPT | Performed by: INTERNAL MEDICINE

## 2024-03-22 PROCEDURE — 36415 COLL VENOUS BLD VENIPUNCTURE: CPT

## 2024-03-22 PROCEDURE — G8399 PT W/DXA RESULTS DOCUMENT: HCPCS | Performed by: INTERNAL MEDICINE

## 2024-03-22 PROCEDURE — G8417 CALC BMI ABV UP PARAM F/U: HCPCS | Performed by: INTERNAL MEDICINE

## 2024-03-22 PROCEDURE — 85025 COMPLETE CBC W/AUTO DIFF WBC: CPT

## 2024-03-22 PROCEDURE — G8484 FLU IMMUNIZE NO ADMIN: HCPCS | Performed by: INTERNAL MEDICINE

## 2024-03-22 PROCEDURE — G8428 CUR MEDS NOT DOCUMENT: HCPCS | Performed by: INTERNAL MEDICINE

## 2024-03-22 PROCEDURE — 80053 COMPREHEN METABOLIC PANEL: CPT

## 2024-03-22 PROCEDURE — 1123F ACP DISCUSS/DSCN MKR DOCD: CPT | Performed by: INTERNAL MEDICINE

## 2024-03-22 PROCEDURE — 1090F PRES/ABSN URINE INCON ASSESS: CPT | Performed by: INTERNAL MEDICINE

## 2024-03-22 PROCEDURE — 1036F TOBACCO NON-USER: CPT | Performed by: INTERNAL MEDICINE

## 2024-03-22 PROCEDURE — 3017F COLORECTAL CA SCREEN DOC REV: CPT | Performed by: INTERNAL MEDICINE

## 2024-03-22 RX ORDER — ANASTROZOLE 1 MG/1
1 TABLET ORAL DAILY
Qty: 90 TABLET | Refills: 3 | Status: SHIPPED | OUTPATIENT
Start: 2024-03-22

## 2024-03-22 RX ORDER — LATANOPROST 50 UG/ML
SOLUTION/ DROPS OPHTHALMIC
COMMUNITY
Start: 2024-02-23

## 2024-03-22 RX ORDER — LOSARTAN POTASSIUM 50 MG/1
TABLET ORAL
COMMUNITY
Start: 2024-03-21

## 2024-03-22 ASSESSMENT — PATIENT HEALTH QUESTIONNAIRE - PHQ9
SUM OF ALL RESPONSES TO PHQ QUESTIONS 1-9: 0
SUM OF ALL RESPONSES TO PHQ9 QUESTIONS 1 & 2: 0
SUM OF ALL RESPONSES TO PHQ QUESTIONS 1-9: 0
1. LITTLE INTEREST OR PLEASURE IN DOING THINGS: NOT AT ALL
2. FEELING DOWN, DEPRESSED OR HOPELESS: NOT AT ALL

## 2024-03-22 NOTE — PROGRESS NOTES
[facility]: Office Visit Established Patient    Chief Complaint:    Chief Complaint   Patient presents with    Follow-up       History of Present Illness:  Ms. Balbuena is a 73 y.o. female who returns today for management of breast cancer.  Ms. Balbuena underwent  a routine mammogram screening at [facility] on 7/14/2020 which showed a new nodularity in the right upper retroareolar region. She then underwent diagnostic mammogram which showed an irregular spiculated mass of the right breast measuring 1.1 x 0.7  x 1.1 cm. On 7/27/2020, patient underwent an ultrasound guided core needle biopsy of the right breast mass. This biopsy revealed invasive breast cancer, low grade, ER 95%, NV 32%, HER2 negative.   On 7/31/2020, Ms. Balbuena completed breast MRI with  results showing 1.2 cm x 1 cm x 0.8 cm enhancing mass at the 11:00 position of the right breast located 3 cm from the nipple which was consistent with the patient's known right breast cancer. However, there was asymmetric anterior right breast skin thickening  which demonstrated additional enhancement.  On 8/20/2020, she had a bilateral mastectomy with both a deep right axillary dissection and right sentinel lymph node biopsy. She had Dr. Stock preform breast reconstruction surgery at the same date of her  bilateral mastectomy. Final surgical pathology showed 1.4 cm of tumor with negative margins, sentinel node negative, left breast was benign. She was referred to oncology for recommendations. Oncotype was low risk at 14, so  chemotherapy could be safely deferred.  She is post-menopausal, so a prescription was given for anastrozole.     She is here today for follow up on Arimidex.  She is tolerating the Arimidex well.  She has no side effects related to the Arimidex.  She follows with her PCP for hypertension and DM, she recently stopped her Jardiance because of side effects.  She denies any new pain.  She denies any chest wall concerns or

## 2024-03-22 NOTE — PATIENT INSTRUCTIONS
Patient Information from Today's Visit    Labs and symptoms reviewed. Continue the Arimidex.    Treatment Summary has been discussed and given to patient:N/A  Follow Up: 6 months.    Please refer to After Visit Summary or [employer] for upcoming appointment information. If you have any questions regarding your upcoming schedule please reach out to your care team through [employer] or call [phone].    -------------------------------------------------------------------------------------------------------------------  Please call our office at [phone] if you have any  of the following symptoms:   Fever of 100.5 or greater  Chills  Shortness of breath  Swelling or pain in one leg    After office hours an answering service is available and will contact a provider for emergencies or if you are experiencing any of the above symptoms.    Patient did express an interest in My Chart.  My Chart log in information explained on the after visit summary printout at the check-out desk.    HERMELINDA QUINN RN

## 2024-09-20 ENCOUNTER — OFFICE VISIT (OUTPATIENT)
Dept: ONCOLOGY | Age: 74
End: 2024-09-20
Payer: MEDICARE

## 2024-09-20 ENCOUNTER — HOSPITAL ENCOUNTER (OUTPATIENT)
Dept: LAB | Age: 74
Discharge: HOME | End: 2024-09-23
Payer: MEDICARE

## 2024-09-20 VITALS
WEIGHT: 249.1 LBS | RESPIRATION RATE: 16 BRPM | OXYGEN SATURATION: 98 % | BODY MASS INDEX: 44.14 KG/M2 | DIASTOLIC BLOOD PRESSURE: 79 MMHG | HEIGHT: 63 IN | TEMPERATURE: 98.9 F | HEART RATE: 72 BPM | SYSTOLIC BLOOD PRESSURE: 161 MMHG

## 2024-09-20 DIAGNOSIS — Z17.0 MALIGNANT NEOPLASM OF RIGHT BREAST IN FEMALE, ESTROGEN RECEPTOR POSITIVE, UNSPECIFIED SITE OF BREAST (HCC): Primary | ICD-10-CM

## 2024-09-20 DIAGNOSIS — C50.911 MALIGNANT NEOPLASM OF RIGHT BREAST IN FEMALE, ESTROGEN RECEPTOR POSITIVE, UNSPECIFIED SITE OF BREAST (HCC): Primary | ICD-10-CM

## 2024-09-20 DIAGNOSIS — Z79.811 LONG TERM CURRENT USE OF AROMATASE INHIBITOR: ICD-10-CM

## 2024-09-20 DIAGNOSIS — Z17.0 MALIGNANT NEOPLASM OF RIGHT BREAST IN FEMALE, ESTROGEN RECEPTOR POSITIVE, UNSPECIFIED SITE OF BREAST (HCC): ICD-10-CM

## 2024-09-20 DIAGNOSIS — C50.911 MALIGNANT NEOPLASM OF RIGHT BREAST IN FEMALE, ESTROGEN RECEPTOR POSITIVE, UNSPECIFIED SITE OF BREAST (HCC): ICD-10-CM

## 2024-09-20 LAB
ALBUMIN SERPL-MCNC: 3.9 G/DL (ref 3.2–4.6)
ALBUMIN/GLOB SERPL: 1 (ref 1–1.9)
ALP SERPL-CCNC: 119 U/L (ref 35–104)
ALT SERPL-CCNC: 17 U/L (ref 12–65)
ANION GAP SERPL CALC-SCNC: 11 MMOL/L (ref 9–18)
AST SERPL-CCNC: 24 U/L (ref 15–37)
BASOPHILS # BLD: 0 K/UL (ref 0–0.2)
BASOPHILS NFR BLD: 1 % (ref 0–2)
BILIRUB SERPL-MCNC: 0.4 MG/DL (ref 0–1.2)
BUN SERPL-MCNC: 20 MG/DL (ref 8–23)
CALCIUM SERPL-MCNC: 10.2 MG/DL (ref 8.8–10.2)
CHLORIDE SERPL-SCNC: 104 MMOL/L (ref 98–107)
CO2 SERPL-SCNC: 26 MMOL/L (ref 20–28)
CREAT SERPL-MCNC: 1.08 MG/DL (ref 0.6–1.1)
DIFFERENTIAL METHOD BLD: NORMAL
EOSINOPHIL # BLD: 0.2 K/UL (ref 0–0.8)
EOSINOPHIL NFR BLD: 4 % (ref 0.5–7.8)
ERYTHROCYTE [DISTWIDTH] IN BLOOD BY AUTOMATED COUNT: 12.4 % (ref 11.9–14.6)
GLOBULIN SER CALC-MCNC: 4 G/DL (ref 2.3–3.5)
GLUCOSE SERPL-MCNC: 105 MG/DL (ref 70–99)
HCT VFR BLD AUTO: 46.3 % (ref 35.8–46.3)
HGB BLD-MCNC: 15.1 G/DL (ref 11.7–15.4)
IMM GRANULOCYTES # BLD AUTO: 0 K/UL (ref 0–0.5)
IMM GRANULOCYTES NFR BLD AUTO: 0 % (ref 0–5)
LYMPHOCYTES # BLD: 1.8 K/UL (ref 0.5–4.6)
LYMPHOCYTES NFR BLD: 37 % (ref 13–44)
MCH RBC QN AUTO: 30.6 PG (ref 26.1–32.9)
MCHC RBC AUTO-ENTMCNC: 32.6 G/DL (ref 31.4–35)
MCV RBC AUTO: 93.7 FL (ref 82–102)
MONOCYTES # BLD: 0.4 K/UL (ref 0.1–1.3)
MONOCYTES NFR BLD: 8 % (ref 4–12)
NEUTS SEG # BLD: 2.5 K/UL (ref 1.7–8.2)
NEUTS SEG NFR BLD: 50 % (ref 43–78)
NRBC # BLD: 0 K/UL (ref 0–0.2)
PLATELET # BLD AUTO: 245 K/UL (ref 150–450)
PMV BLD AUTO: 10.2 FL (ref 9.4–12.3)
POTASSIUM SERPL-SCNC: 4.5 MMOL/L (ref 3.5–5.1)
PROT SERPL-MCNC: 7.9 G/DL (ref 6.3–8.2)
RBC # BLD AUTO: 4.94 M/UL (ref 4.05–5.2)
SODIUM SERPL-SCNC: 141 MMOL/L (ref 136–145)
WBC # BLD AUTO: 4.9 K/UL (ref 4.3–11.1)

## 2024-09-20 PROCEDURE — 3017F COLORECTAL CA SCREEN DOC REV: CPT | Performed by: NURSE PRACTITIONER

## 2024-09-20 PROCEDURE — 80053 COMPREHEN METABOLIC PANEL: CPT

## 2024-09-20 PROCEDURE — 1036F TOBACCO NON-USER: CPT | Performed by: NURSE PRACTITIONER

## 2024-09-20 PROCEDURE — 36415 COLL VENOUS BLD VENIPUNCTURE: CPT

## 2024-09-20 PROCEDURE — 99214 OFFICE O/P EST MOD 30 MIN: CPT | Performed by: NURSE PRACTITIONER

## 2024-09-20 PROCEDURE — 1090F PRES/ABSN URINE INCON ASSESS: CPT | Performed by: NURSE PRACTITIONER

## 2024-09-20 PROCEDURE — G8417 CALC BMI ABV UP PARAM F/U: HCPCS | Performed by: NURSE PRACTITIONER

## 2024-09-20 PROCEDURE — 1123F ACP DISCUSS/DSCN MKR DOCD: CPT | Performed by: NURSE PRACTITIONER

## 2024-09-20 PROCEDURE — G8427 DOCREV CUR MEDS BY ELIG CLIN: HCPCS | Performed by: NURSE PRACTITIONER

## 2024-09-20 PROCEDURE — G8399 PT W/DXA RESULTS DOCUMENT: HCPCS | Performed by: NURSE PRACTITIONER

## 2024-09-20 PROCEDURE — 85025 COMPLETE CBC W/AUTO DIFF WBC: CPT

## 2024-09-20 ASSESSMENT — PATIENT HEALTH QUESTIONNAIRE - PHQ9
SUM OF ALL RESPONSES TO PHQ QUESTIONS 1-9: 0
1. LITTLE INTEREST OR PLEASURE IN DOING THINGS: NOT AT ALL
SUM OF ALL RESPONSES TO PHQ9 QUESTIONS 1 & 2: 0
SUM OF ALL RESPONSES TO PHQ QUESTIONS 1-9: 0
2. FEELING DOWN, DEPRESSED OR HOPELESS: NOT AT ALL

## 2025-03-18 ENCOUNTER — HOSPITAL ENCOUNTER (OUTPATIENT)
Dept: MAMMOGRAPHY | Age: 75
Discharge: HOME | End: 2025-03-21
Attending: INTERNAL MEDICINE
Payer: MEDICARE

## 2025-03-18 DIAGNOSIS — Z17.0 MALIGNANT NEOPLASM OF RIGHT BREAST IN FEMALE, ESTROGEN RECEPTOR POSITIVE, UNSPECIFIED SITE OF BREAST: ICD-10-CM

## 2025-03-18 DIAGNOSIS — Z79.811 LONG TERM CURRENT USE OF AROMATASE INHIBITOR: ICD-10-CM

## 2025-03-18 DIAGNOSIS — C50.911 MALIGNANT NEOPLASM OF RIGHT BREAST IN FEMALE, ESTROGEN RECEPTOR POSITIVE, UNSPECIFIED SITE OF BREAST: ICD-10-CM

## 2025-03-18 PROCEDURE — 77080 DXA BONE DENSITY AXIAL: CPT

## 2025-03-19 ENCOUNTER — TELEPHONE (OUTPATIENT)
Dept: ONCOLOGY | Age: 75
End: 2025-03-19

## 2025-03-19 NOTE — TELEPHONE ENCOUNTER
Pt's VA insurance is not verifying for 3/21 appt. Can someone please review and confirm authorization? Thank you!

## 2025-03-20 ENCOUNTER — CLINICAL DOCUMENTATION (OUTPATIENT)
Dept: ONCOLOGY | Age: 75
End: 2025-03-20

## 2025-03-20 NOTE — PROGRESS NOTES
In-basket message received from registration staff notifying that the patient has VA insurance coverage.  There is currently no oncology VA referral/authorization on file and no prior referrals/authorizations located under the media tab in [employer].    VA Form 10-52483 Request for Services Form completed and faxed to [facility] at #[phone] along with the following medical records to request approval for use of VA benefits to cover oncology services:    Oncology office visit progress note dated 3/22/24  Oncology office visit progress note dated 9/20/24  DEXA Scan results dated 3/18/25     Proficient Transaction ID [id number]

## 2025-03-21 ENCOUNTER — HOSPITAL ENCOUNTER (OUTPATIENT)
Dept: LAB | Age: 75
Discharge: HOME | End: 2025-03-21
Payer: MEDICARE

## 2025-03-21 ENCOUNTER — OFFICE VISIT (OUTPATIENT)
Dept: ONCOLOGY | Age: 75
End: 2025-03-21
Payer: MEDICARE

## 2025-03-21 VITALS
HEART RATE: 71 BPM | WEIGHT: 244.5 LBS | RESPIRATION RATE: 16 BRPM | SYSTOLIC BLOOD PRESSURE: 136 MMHG | OXYGEN SATURATION: 96 % | HEIGHT: 63 IN | BODY MASS INDEX: 43.32 KG/M2 | DIASTOLIC BLOOD PRESSURE: 86 MMHG | TEMPERATURE: 98 F

## 2025-03-21 DIAGNOSIS — Z17.0 MALIGNANT NEOPLASM OF RIGHT BREAST IN FEMALE, ESTROGEN RECEPTOR POSITIVE, UNSPECIFIED SITE OF BREAST: ICD-10-CM

## 2025-03-21 DIAGNOSIS — C50.911 MALIGNANT NEOPLASM OF RIGHT BREAST IN FEMALE, ESTROGEN RECEPTOR POSITIVE, UNSPECIFIED SITE OF BREAST: ICD-10-CM

## 2025-03-21 LAB
ALBUMIN SERPL-MCNC: 3.5 G/DL (ref 3.2–4.6)
ALBUMIN/GLOB SERPL: 0.8 (ref 1–1.9)
ALP SERPL-CCNC: 116 U/L (ref 35–104)
ALT SERPL-CCNC: 15 U/L (ref 8–45)
ANION GAP SERPL CALC-SCNC: 9 MMOL/L (ref 7–16)
AST SERPL-CCNC: 19 U/L (ref 15–37)
BASOPHILS # BLD: 0.03 K/UL (ref 0–0.2)
BASOPHILS NFR BLD: 0.6 % (ref 0–2)
BILIRUB SERPL-MCNC: 0.3 MG/DL (ref 0–1.2)
BUN SERPL-MCNC: 17 MG/DL (ref 8–23)
CALCIUM SERPL-MCNC: 9.6 MG/DL (ref 8.8–10.2)
CHLORIDE SERPL-SCNC: 107 MMOL/L (ref 98–107)
CO2 SERPL-SCNC: 27 MMOL/L (ref 20–29)
CREAT SERPL-MCNC: 1.08 MG/DL (ref 0.6–1.1)
DIFFERENTIAL METHOD BLD: NORMAL
EOSINOPHIL # BLD: 0.15 K/UL (ref 0–0.8)
EOSINOPHIL NFR BLD: 3.2 % (ref 0.5–7.8)
ERYTHROCYTE [DISTWIDTH] IN BLOOD BY AUTOMATED COUNT: 12.6 % (ref 11.9–14.6)
GLOBULIN SER CALC-MCNC: 4.4 G/DL (ref 2.3–3.5)
GLUCOSE SERPL-MCNC: 94 MG/DL (ref 70–99)
HCT VFR BLD AUTO: 46 % (ref 35.8–46.3)
HGB BLD-MCNC: 15 G/DL (ref 11.7–15.4)
IMM GRANULOCYTES # BLD AUTO: 0.01 K/UL (ref 0–0.5)
IMM GRANULOCYTES NFR BLD AUTO: 0.2 % (ref 0–5)
LYMPHOCYTES # BLD: 1.72 K/UL (ref 0.5–4.6)
LYMPHOCYTES NFR BLD: 36.3 % (ref 13–44)
MCH RBC QN AUTO: 31 PG (ref 26.1–32.9)
MCHC RBC AUTO-ENTMCNC: 32.6 G/DL (ref 31.4–35)
MCV RBC AUTO: 95 FL (ref 82–102)
MONOCYTES # BLD: 0.35 K/UL (ref 0.1–1.3)
MONOCYTES NFR BLD: 7.4 % (ref 4–12)
NEUTS SEG # BLD: 2.48 K/UL (ref 1.7–8.2)
NEUTS SEG NFR BLD: 52.3 % (ref 43–78)
NRBC # BLD: 0 K/UL (ref 0–0.2)
PLATELET # BLD AUTO: 265 K/UL (ref 150–450)
PMV BLD AUTO: 10.1 FL (ref 9.4–12.3)
POTASSIUM SERPL-SCNC: 4.1 MMOL/L (ref 3.5–5.1)
PROT SERPL-MCNC: 7.9 G/DL (ref 6.3–8.2)
RBC # BLD AUTO: 4.84 M/UL (ref 4.05–5.2)
SODIUM SERPL-SCNC: 143 MMOL/L (ref 136–145)
WBC # BLD AUTO: 4.7 K/UL (ref 4.3–11.1)

## 2025-03-21 PROCEDURE — 36415 COLL VENOUS BLD VENIPUNCTURE: CPT

## 2025-03-21 PROCEDURE — 3017F COLORECTAL CA SCREEN DOC REV: CPT | Performed by: INTERNAL MEDICINE

## 2025-03-21 PROCEDURE — 85025 COMPLETE CBC W/AUTO DIFF WBC: CPT

## 2025-03-21 PROCEDURE — 99214 OFFICE O/P EST MOD 30 MIN: CPT | Performed by: INTERNAL MEDICINE

## 2025-03-21 PROCEDURE — 1036F TOBACCO NON-USER: CPT | Performed by: INTERNAL MEDICINE

## 2025-03-21 PROCEDURE — 1090F PRES/ABSN URINE INCON ASSESS: CPT | Performed by: INTERNAL MEDICINE

## 2025-03-21 PROCEDURE — 1126F AMNT PAIN NOTED NONE PRSNT: CPT | Performed by: INTERNAL MEDICINE

## 2025-03-21 PROCEDURE — G8399 PT W/DXA RESULTS DOCUMENT: HCPCS | Performed by: INTERNAL MEDICINE

## 2025-03-21 PROCEDURE — G8428 CUR MEDS NOT DOCUMENT: HCPCS | Performed by: INTERNAL MEDICINE

## 2025-03-21 PROCEDURE — G8417 CALC BMI ABV UP PARAM F/U: HCPCS | Performed by: INTERNAL MEDICINE

## 2025-03-21 PROCEDURE — 1123F ACP DISCUSS/DSCN MKR DOCD: CPT | Performed by: INTERNAL MEDICINE

## 2025-03-21 PROCEDURE — 80053 COMPREHEN METABOLIC PANEL: CPT

## 2025-03-21 RX ORDER — LOSARTAN POTASSIUM 100 MG/1
100 TABLET ORAL DAILY
COMMUNITY
Start: 2024-12-20

## 2025-03-21 RX ORDER — FUROSEMIDE 20 MG/1
20 TABLET ORAL DAILY PRN
COMMUNITY
Start: 2024-12-20 | End: 2025-12-20

## 2025-03-21 RX ORDER — ANASTROZOLE 1 MG/1
1 TABLET ORAL DAILY
Qty: 90 TABLET | Refills: 1 | Status: SHIPPED | OUTPATIENT
Start: 2025-03-21

## 2025-03-21 ASSESSMENT — PATIENT HEALTH QUESTIONNAIRE - PHQ9
2. FEELING DOWN, DEPRESSED OR HOPELESS: NOT AT ALL
SUM OF ALL RESPONSES TO PHQ QUESTIONS 1-9: 0
1. LITTLE INTEREST OR PLEASURE IN DOING THINGS: NOT AT ALL
SUM OF ALL RESPONSES TO PHQ QUESTIONS 1-9: 0

## 2025-03-21 NOTE — PROGRESS NOTES
omeprazole (PRILOSEC) 40 MG delayed release capsule Take 1 capsule by mouth daily      JARDIANCE 10 MG tablet  (Patient not taking: Reported on 3/21/2025)       No current facility-administered medications for this visit.       OBJECTIVE:  /86   Pulse 71   Temp 98 °F (36.7 °C) (Oral)   Resp 16   Ht 1.588 m (5' 2.5\")   Wt 110.9 kg (244 lb 8 oz)   SpO2 96%   BMI 44.01 kg/m²   Pain Score:   0 - No pain (fatigue- 0)  ECO    Physical Exam:  Constitutional: Well developed, well nourished female in no acute distress, sitting comfortably in the exam room chair.    HEENT: Normocephalic and atraumatic. Sclerae anicteric. Neck supple without JVD. No thyromegaly present.    Lymph node   No palpable submandibular, cervical, supraclavicular lymph nodes.   Skin Warm and dry.  No bruising and no rash noted.  No erythema.  No pallor.    Respiratory Lungs are clear to auscultation bilaterally without wheezes, rales or rhonchi, normal air exchange without accessory muscle use.    CVS Normal rate, regular rhythm and normal S1 and S2.  No murmurs, gallops, or rubs.   Neuro Grossly nonfocal with no obvious sensory or motor deficits.   MSK Normal range of motion in general.  No edema and no tenderness.   Psych Appropriate mood and affect.      Labs:  Recent Results (from the past 96 hours)   CBC with Auto Differential    Collection Time: 25 10:03 AM   Result Value Ref Range    WBC 4.7 4.3 - 11.1 K/uL    RBC 4.84 4.05 - 5.2 M/uL    Hemoglobin 15.0 11.7 - 15.4 g/dL    Hematocrit 46.0 35.8 - 46.3 %    MCV 95.0 82.0 - 102.0 FL    MCH 31.0 26.1 - 32.9 PG    MCHC 32.6 31.4 - 35.0 g/dL    RDW 12.6 11.9 - 14.6 %    Platelets 265 150 - 450 K/uL    MPV 10.1 9.4 - 12.3 FL    nRBC 0.00 0.0 - 0.2 K/uL    Neutrophils % 52.3 43.0 - 78.0 %    Lymphocytes % 36.3 13.0 - 44.0 %    Monocytes % 7.4 4.0 - 12.0 %    Eosinophils % 3.2 0.5 - 7.8 %    Basophils % 0.6 0.0 - 2.0 %    Immature Granulocytes % 0.2 0.0 - 5.0 %    Neutrophils

## 2025-03-21 NOTE — PATIENT INSTRUCTIONS
team through [employer] or call [phone].    Please notify your assigned Nurse Navigator of any unplanned hospital admissions or Emergency Room visits within 24 hours of discharge.    -------------------------------------------------------------------------------------------------------------------  Please call our office at [phone] if you have any  of the following symptoms:   Fever of 100.5 or greater  Chills  Shortness of breath  Swelling or pain in one leg    After office hours an answering service is available and will contact a provider for emergencies or if you are experiencing any of the above symptoms.        HERMELINDA QUINN, RN

## (undated) DEVICE — HEX-LOCKING BLADE ELECTRODE: Brand: EDGE

## (undated) DEVICE — GOWN,REINFORCED,POLY,AURORA,XXLARGE,STR: Brand: MEDLINE

## (undated) DEVICE — SHEET, DRAPE, SPLIT, STERILE: Brand: MEDLINE

## (undated) DEVICE — DRAPE TWL SURG 16X26IN BLU ORB04] ALLCARE INC]

## (undated) DEVICE — SUTURE ABSRB X-1 REV CUT 1/2 CIR 22MM UD BRAID 27IN SZ 3-0 J458H

## (undated) DEVICE — MARKER UTIL W/RULER AND LBL -- STRL

## (undated) DEVICE — BLADE ELECTRODE: Brand: VALLEYLAB

## (undated) DEVICE — UNIVERSAL DRAPES: Brand: MEDLINE INDUSTRIES, INC.

## (undated) DEVICE — SYSTEM IMPL DEL FOR BRST IMPL FUN (SEE COMMENT)

## (undated) DEVICE — SURGICAL PROCEDURE PACK BASIC ST FRANCIS

## (undated) DEVICE — NEPTUNE E-SEP SMOKE EVACUATION PENCIL, COATED, 70MM BLADE, PUSH BUTTON SWITCH: Brand: NEPTUNE E-SEP

## (undated) DEVICE — DRAPE,TOP,102X53,STERILE: Brand: MEDLINE

## (undated) DEVICE — GAUZE,SPONGE,4"X4",16PLY,STRL,LF,10/TRAY: Brand: MEDLINE

## (undated) DEVICE — STAPLER SKIN L39MM DIA0.53MM CRWN 5.7MM S STL FIX HD PROX

## (undated) DEVICE — PAD,ABDOMINAL,5"X9",ST,LF,25/BX: Brand: MEDLINE INDUSTRIES, INC.

## (undated) DEVICE — SUTURE ETHLN SZ 3-0 L18IN NONABSORBABLE BLK FS-1 L24MM 3/8 663H

## (undated) DEVICE — DRAIN SURG 15FR SIL RND CHN W/ TRCR FULL FLUT DBL WRP TRAD

## (undated) DEVICE — DRESSING,GAUZE,XEROFORM,CURAD,5"X9",ST: Brand: CURAD

## (undated) DEVICE — SUTURE MCRYL SZ 4-0 L27IN ABSRB UD L19MM PS-2 1/2 CIR PRIM Y426H

## (undated) DEVICE — ABDOMINAL PAD: Brand: DERMACEA

## (undated) DEVICE — Device

## (undated) DEVICE — GOWN,REINF,POLY,ECL,PP SLV,XL: Brand: MEDLINE

## (undated) DEVICE — DRAPE,U/SHT,SPLIT,FILM,60X84,STERILE: Brand: MEDLINE

## (undated) DEVICE — UTILITY MARKER,BLACK WITH LABELS: Brand: DEVON

## (undated) DEVICE — GARMENT,MEDLINE,DVT,SEQUENTIAL,CALF,MD: Brand: MEDLINE

## (undated) DEVICE — 2000CC GUARDIAN II: Brand: GUARDIAN

## (undated) DEVICE — SUT MERS 3-0 18IN FS1 WHT --

## (undated) DEVICE — SINGLE BASIN: Brand: CARDINAL HEALTH

## (undated) DEVICE — MINOR SPLIT GENERAL: Brand: MEDLINE INDUSTRIES, INC.

## (undated) DEVICE — CONTAINER,SPECIMEN,O.R.STRL,4.5OZ: Brand: MEDLINE

## (undated) DEVICE — BLANKET WRM W40.2XL55.9IN IORT LO BODY + MISTRAL AIR

## (undated) DEVICE — SPONGE LAP 18X18IN STRL -- 5/PK

## (undated) DEVICE — MASTISOL ADHESIVE LIQ 2/3ML

## (undated) DEVICE — PACK,CARDIOVASCULAR,DRAPE,GOWN,SEE TEXT: Brand: MEDLINE

## (undated) DEVICE — SUTURE PDS II SZ 3-0 L18IN ABSRB UD L19MM PS-2 3/8 CIR PRIM Z497G

## (undated) DEVICE — TRAY PREP DRY W/ PREM GLV 2 APPL 6 SPNG 2 UNDPD 1 OVERWRAP

## (undated) DEVICE — AMD ANTIMICROBIAL GAUZE SPONGES,12 PLY USP TYPE VII, 0.2% POLYHEXAMETHYLENE BIGUANIDE HCI (PHMB): Brand: CURITY

## (undated) DEVICE — RESERVOIR,SUCTION,100CC,SILICONE: Brand: MEDLINE

## (undated) DEVICE — NEEDLE HYPO 25GA L5/8IN ORNG HUB S STL LATCH BVL UP

## (undated) DEVICE — TUBING, SUCTION, 1/4" X 10', STRAIGHT: Brand: MEDLINE

## (undated) DEVICE — SPONGE GZ W6XL6IN COT 6 PLY SUP FLUF EXTRA ABSRB FOR PRE OP

## (undated) DEVICE — REM POLYHESIVE ADULT PATIENT RETURN ELECTRODE: Brand: VALLEYLAB

## (undated) DEVICE — SYR LR LCK 1ML GRAD NSAF 30ML --

## (undated) DEVICE — SOLUTION IV 1000ML 0.9% SOD CHL

## (undated) DEVICE — BANDAGE,GAUZE,BULKEE II,4.5"X4.1YD,STRL: Brand: MEDLINE

## (undated) DEVICE — DRAIN SURG W10MMXL20CM SIL FLAT HUBLESS W/ RADPQ STRP 3/4

## (undated) DEVICE — SUTURE PDS II SZ 3-0 L18IN ABSRB UD PS-1 L24MM 3/8 CIR REV Z683G

## (undated) DEVICE — YANKAUER,BULB TIP,W/O VENT,RIGID,STERILE: Brand: MEDLINE

## (undated) DEVICE — SUTURE VCRL SZ 2-0 L27IN ABSRB UD L36MM CP-1 1/2 CIR REV J266H

## (undated) DEVICE — CONTAINER SPEC HISTOLOGY 900ML POLYPR

## (undated) DEVICE — SUPPORT MAMM SURG 48-50 IN 2XL BRA

## (undated) DEVICE — SYRINGE IRRIG 60ML SFT PLIABLE BLB EZ TO GRP 1 HND USE W/

## (undated) DEVICE — HIGH PROFILE, HP 650CC GEL SIZER: Brand: MENTOR MEMORYGEL RESTERILIZABLE GEL SIZER

## (undated) DEVICE — OCCLUSIVE GAUZE STRIP,3% BISMUTH TRIBROMOPHENATE IN PETROLATUM BLEND: Brand: XEROFORM

## (undated) DEVICE — TETRA-FLEX CF WOVEN LATEX FREE ELASTIC BANDAGE 6" X 11 YD: Brand: TETRA-FLEX™CF

## (undated) DEVICE — SYR 10ML LUER LOK 1/5ML GRAD --

## (undated) DEVICE — COVER PRB L11.9CM TAPR L3.8X61CM TRNSPAR SFT PLIABLE

## (undated) DEVICE — 1840 FOAM BLOCK NEEDLE COUNTER: Brand: DEVON

## (undated) DEVICE — DRAIN WND RND SILICONE 15FR --

## (undated) DEVICE — SUT SLK 2-0SH 30IN BLK --

## (undated) DEVICE — PREP SKN CHLRAPRP APL 26ML STR --

## (undated) DEVICE — COTTON BALLS: Brand: DEROYAL

## (undated) DEVICE — STRIP,CLOSURE,WOUND,MEDI-STRIP,1/2X4: Brand: MEDLINE

## (undated) DEVICE — (D)PREP SKN CHLRAPRP APPL 26ML -- CONVERT TO ITEM 371833

## (undated) DEVICE — 3M™ TEGADERM™ TRANSPARENT FILM DRESSING FRAME STYLE, 1626W, 4 IN X 4-3/4 IN (10 CM X 12 CM), 50/CT 4CT/CASE: Brand: 3M™ TEGADERM™

## (undated) DEVICE — AEGIS 1" DISK 4MM HOLE, PEEL OPEN: Brand: MEDLINE

## (undated) DEVICE — KIT TISS EXP W/ 60ML SYR 122CM TRNSF SET PIERCING DEV L25CM

## (undated) DEVICE — NEEDLE HYPO 18GA L1.5IN PNK S STL HUB POLYPR SHLD REG BVL

## (undated) DEVICE — NEEDLE HYPO 21GA L1.5IN INTRAMUSCULAR S STL LATCH BVL UP

## (undated) DEVICE — Device: Brand: MEDCO MANUFACTURING INC

## (undated) DEVICE — COVER,MAYO STAND,STERILE: Brand: MEDLINE